# Patient Record
Sex: MALE | Race: WHITE | Employment: OTHER | URBAN - NONMETROPOLITAN AREA
[De-identification: names, ages, dates, MRNs, and addresses within clinical notes are randomized per-mention and may not be internally consistent; named-entity substitution may affect disease eponyms.]

---

## 2017-01-23 DIAGNOSIS — K21.9 GASTROESOPHAGEAL REFLUX DISEASE, ESOPHAGITIS PRESENCE NOT SPECIFIED: ICD-10-CM

## 2017-01-23 RX ORDER — RANITIDINE 300 MG/1
TABLET ORAL
Qty: 30 TABLET | Refills: 11 | Status: SHIPPED | OUTPATIENT
Start: 2017-01-23 | End: 2017-12-06

## 2017-01-23 RX ORDER — FERROUS SULFATE 325(65) MG
TABLET ORAL
Qty: 30 TABLET | Refills: 11 | Status: SHIPPED | OUTPATIENT
Start: 2017-01-23 | End: 2018-10-27 | Stop reason: SDUPTHER

## 2017-02-06 ENCOUNTER — OFFICE VISIT (OUTPATIENT)
Dept: FAMILY MEDICINE CLINIC | Age: 62
End: 2017-02-06

## 2017-02-06 VITALS
SYSTOLIC BLOOD PRESSURE: 122 MMHG | HEIGHT: 70 IN | DIASTOLIC BLOOD PRESSURE: 72 MMHG | WEIGHT: 248.2 LBS | BODY MASS INDEX: 35.53 KG/M2 | OXYGEN SATURATION: 97 % | HEART RATE: 87 BPM

## 2017-02-06 DIAGNOSIS — K31.9 DYSPEPSIA AND DISORDER OF FUNCTION OF STOMACH: ICD-10-CM

## 2017-02-06 DIAGNOSIS — E78.2 MIXED HYPERLIPIDEMIA: ICD-10-CM

## 2017-02-06 DIAGNOSIS — K21.9 GASTROESOPHAGEAL REFLUX DISEASE WITHOUT ESOPHAGITIS: ICD-10-CM

## 2017-02-06 DIAGNOSIS — M15.9 PRIMARY OSTEOARTHRITIS INVOLVING MULTIPLE JOINTS: ICD-10-CM

## 2017-02-06 DIAGNOSIS — R73.9 HYPERGLYCEMIA: ICD-10-CM

## 2017-02-06 DIAGNOSIS — Z11.59 NEED FOR HEPATITIS C SCREENING TEST: ICD-10-CM

## 2017-02-06 DIAGNOSIS — Z72.0 TOBACCO ABUSE: ICD-10-CM

## 2017-02-06 DIAGNOSIS — M1A.0790 IDIOPATHIC CHRONIC GOUT OF FOOT WITHOUT TOPHUS, UNSPECIFIED LATERALITY: ICD-10-CM

## 2017-02-06 DIAGNOSIS — R10.13 DYSPEPSIA AND DISORDER OF FUNCTION OF STOMACH: ICD-10-CM

## 2017-02-06 DIAGNOSIS — Z11.4 SCREENING FOR HIV (HUMAN IMMUNODEFICIENCY VIRUS): Primary | ICD-10-CM

## 2017-02-06 DIAGNOSIS — I10 ESSENTIAL HYPERTENSION, BENIGN: ICD-10-CM

## 2017-02-06 PROBLEM — M15.0 PRIMARY OSTEOARTHRITIS INVOLVING MULTIPLE JOINTS: Status: ACTIVE | Noted: 2017-02-06

## 2017-02-06 PROCEDURE — 99214 OFFICE O/P EST MOD 30 MIN: CPT | Performed by: FAMILY MEDICINE

## 2017-02-06 ASSESSMENT — PATIENT HEALTH QUESTIONNAIRE - PHQ9
1. LITTLE INTEREST OR PLEASURE IN DOING THINGS: 0
SUM OF ALL RESPONSES TO PHQ QUESTIONS 1-9: 0
SUM OF ALL RESPONSES TO PHQ9 QUESTIONS 1 & 2: 0
2. FEELING DOWN, DEPRESSED OR HOPELESS: 0

## 2017-02-07 LAB
HEPATITIS C ANTIBODY INTERPRETATION: NORMAL
HIV-1 AND HIV-2 ANTIBODIES: NORMAL
URIC ACID, SERUM: 6.1 MG/DL (ref 3.5–7.2)

## 2017-02-08 ENCOUNTER — TELEPHONE (OUTPATIENT)
Dept: FAMILY MEDICINE CLINIC | Age: 62
End: 2017-02-08

## 2017-02-08 DIAGNOSIS — Z23 NEED FOR ZOSTER VACCINATION: Primary | ICD-10-CM

## 2017-03-16 RX ORDER — MELATONIN
Qty: 100 TABLET | Refills: 0 | Status: SHIPPED | OUTPATIENT
Start: 2017-03-16 | End: 2019-05-08 | Stop reason: SDUPTHER

## 2017-04-18 RX ORDER — CHLORAL HYDRATE 500 MG
CAPSULE ORAL
Qty: 360 CAPSULE | Refills: 0 | Status: SHIPPED | OUTPATIENT
Start: 2017-04-18 | End: 2018-06-21 | Stop reason: SDUPTHER

## 2017-06-19 RX ORDER — ENALAPRIL MALEATE 20 MG/1
TABLET ORAL
Qty: 60 TABLET | Refills: 11 | Status: SHIPPED | OUTPATIENT
Start: 2017-06-19 | End: 2018-06-21 | Stop reason: SDUPTHER

## 2017-06-19 RX ORDER — SIMVASTATIN 20 MG
TABLET ORAL
Qty: 30 TABLET | Refills: 11 | Status: SHIPPED | OUTPATIENT
Start: 2017-06-19 | End: 2018-07-16 | Stop reason: SDUPTHER

## 2017-07-17 ENCOUNTER — TELEPHONE (OUTPATIENT)
Dept: FAMILY MEDICINE CLINIC | Age: 62
End: 2017-07-17

## 2017-08-08 ENCOUNTER — OFFICE VISIT (OUTPATIENT)
Dept: FAMILY MEDICINE CLINIC | Age: 62
End: 2017-08-08

## 2017-08-08 VITALS
HEIGHT: 70 IN | DIASTOLIC BLOOD PRESSURE: 80 MMHG | BODY MASS INDEX: 35.36 KG/M2 | SYSTOLIC BLOOD PRESSURE: 126 MMHG | OXYGEN SATURATION: 96 % | HEART RATE: 66 BPM | WEIGHT: 247 LBS | TEMPERATURE: 98.7 F

## 2017-08-08 DIAGNOSIS — E78.2 MIXED HYPERLIPIDEMIA: ICD-10-CM

## 2017-08-08 DIAGNOSIS — R10.13 DYSPEPSIA AND DISORDER OF FUNCTION OF STOMACH: ICD-10-CM

## 2017-08-08 DIAGNOSIS — Z01.818 PRE-OP EXAMINATION: Primary | ICD-10-CM

## 2017-08-08 DIAGNOSIS — Z72.0 TOBACCO ABUSE: ICD-10-CM

## 2017-08-08 DIAGNOSIS — M15.9 PRIMARY OSTEOARTHRITIS INVOLVING MULTIPLE JOINTS: ICD-10-CM

## 2017-08-08 DIAGNOSIS — I10 ESSENTIAL HYPERTENSION, BENIGN: ICD-10-CM

## 2017-08-08 DIAGNOSIS — R73.9 HYPERGLYCEMIA: ICD-10-CM

## 2017-08-08 DIAGNOSIS — R97.20 ABNORMAL PSA: ICD-10-CM

## 2017-08-08 DIAGNOSIS — K21.9 GASTROESOPHAGEAL REFLUX DISEASE WITHOUT ESOPHAGITIS: ICD-10-CM

## 2017-08-08 DIAGNOSIS — M1A.0790 IDIOPATHIC CHRONIC GOUT OF FOOT WITHOUT TOPHUS, UNSPECIFIED LATERALITY: ICD-10-CM

## 2017-08-08 DIAGNOSIS — K31.9 DYSPEPSIA AND DISORDER OF FUNCTION OF STOMACH: ICD-10-CM

## 2017-08-08 DIAGNOSIS — G47.33 OSA TREATED WITH BIPAP: ICD-10-CM

## 2017-08-08 DIAGNOSIS — E55.9 VITAMIN D DEFICIENCY: ICD-10-CM

## 2017-08-08 LAB
A/G RATIO: 1.8 (ref 1.1–2.2)
ALBUMIN SERPL-MCNC: 4.6 G/DL (ref 3.4–5)
ALP BLD-CCNC: 94 U/L (ref 40–129)
ALT SERPL-CCNC: 16 U/L (ref 10–40)
ANION GAP SERPL CALCULATED.3IONS-SCNC: 12 MMOL/L (ref 3–16)
AST SERPL-CCNC: 19 U/L (ref 15–37)
BASOPHILS ABSOLUTE: 0 K/UL (ref 0–0.2)
BASOPHILS RELATIVE PERCENT: 0.3 %
BILIRUB SERPL-MCNC: 0.4 MG/DL (ref 0–1)
BUN BLDV-MCNC: 17 MG/DL (ref 7–20)
CALCIUM SERPL-MCNC: 9.8 MG/DL (ref 8.3–10.6)
CHLORIDE BLD-SCNC: 101 MMOL/L (ref 99–110)
CHOLESTEROL, TOTAL: 112 MG/DL (ref 0–199)
CO2: 26 MMOL/L (ref 21–32)
CREAT SERPL-MCNC: 0.9 MG/DL (ref 0.8–1.3)
EOSINOPHILS ABSOLUTE: 0.2 K/UL (ref 0–0.6)
EOSINOPHILS RELATIVE PERCENT: 3.3 %
GFR AFRICAN AMERICAN: >60
GFR NON-AFRICAN AMERICAN: >60
GLOBULIN: 2.6 G/DL
GLUCOSE BLD-MCNC: 103 MG/DL (ref 70–99)
HCT VFR BLD CALC: 40.2 % (ref 40.5–52.5)
HDLC SERPL-MCNC: 31 MG/DL (ref 40–60)
HEMOGLOBIN: 13.7 G/DL (ref 13.5–17.5)
LDL CHOLESTEROL CALCULATED: 63 MG/DL
LYMPHOCYTES ABSOLUTE: 1.7 K/UL (ref 1–5.1)
LYMPHOCYTES RELATIVE PERCENT: 27.9 %
MCH RBC QN AUTO: 32 PG (ref 26–34)
MCHC RBC AUTO-ENTMCNC: 34 G/DL (ref 31–36)
MCV RBC AUTO: 94.1 FL (ref 80–100)
MONOCYTES ABSOLUTE: 0.5 K/UL (ref 0–1.3)
MONOCYTES RELATIVE PERCENT: 8.9 %
NEUTROPHILS ABSOLUTE: 3.6 K/UL (ref 1.7–7.7)
NEUTROPHILS RELATIVE PERCENT: 59.6 %
PDW BLD-RTO: 13.9 % (ref 12.4–15.4)
PLATELET # BLD: 214 K/UL (ref 135–450)
PMV BLD AUTO: 9.4 FL (ref 5–10.5)
POTASSIUM SERPL-SCNC: 4.9 MMOL/L (ref 3.5–5.1)
RBC # BLD: 4.27 M/UL (ref 4.2–5.9)
SODIUM BLD-SCNC: 139 MMOL/L (ref 136–145)
TOTAL PROTEIN: 7.2 G/DL (ref 6.4–8.2)
TRIGL SERPL-MCNC: 89 MG/DL (ref 0–150)
VITAMIN D 25-HYDROXY: 46.6 NG/ML
VLDLC SERPL CALC-MCNC: 18 MG/DL
WBC # BLD: 6 K/UL (ref 4–11)

## 2017-08-08 PROCEDURE — 36415 COLL VENOUS BLD VENIPUNCTURE: CPT | Performed by: FAMILY MEDICINE

## 2017-08-08 PROCEDURE — 99245 OFF/OP CONSLTJ NEW/EST HI 55: CPT | Performed by: FAMILY MEDICINE

## 2017-08-08 RX ORDER — TAMSULOSIN HYDROCHLORIDE 0.4 MG/1
CAPSULE ORAL
COMMUNITY
Start: 2017-07-19 | End: 2017-10-30 | Stop reason: ALTCHOICE

## 2017-08-08 RX ORDER — SULINDAC 150 MG/1
TABLET ORAL
COMMUNITY
Start: 2017-07-25 | End: 2017-08-08 | Stop reason: SDUPTHER

## 2017-08-18 RX ORDER — NIACIN 1000 MG/1
TABLET, EXTENDED RELEASE ORAL
Qty: 30 TABLET | Refills: 1 | Status: SHIPPED | OUTPATIENT
Start: 2017-08-18 | End: 2017-09-28 | Stop reason: SDUPTHER

## 2017-09-28 RX ORDER — NIACIN 1000 MG/1
TABLET, EXTENDED RELEASE ORAL
Qty: 30 TABLET | Refills: 0 | Status: SHIPPED | OUTPATIENT
Start: 2017-09-28 | End: 2017-12-02 | Stop reason: SDUPTHER

## 2017-12-01 ENCOUNTER — OFFICE VISIT (OUTPATIENT)
Dept: PULMONOLOGY | Age: 62
End: 2017-12-01

## 2017-12-01 VITALS
DIASTOLIC BLOOD PRESSURE: 72 MMHG | HEART RATE: 78 BPM | HEIGHT: 70 IN | WEIGHT: 238.2 LBS | RESPIRATION RATE: 20 BRPM | OXYGEN SATURATION: 98 % | TEMPERATURE: 97.7 F | BODY MASS INDEX: 34.1 KG/M2 | SYSTOLIC BLOOD PRESSURE: 112 MMHG

## 2017-12-01 DIAGNOSIS — G47.33 OSA TREATED WITH BIPAP: Primary | ICD-10-CM

## 2017-12-01 DIAGNOSIS — Z71.89 ENCOUNTER FOR BIPAP USE COUNSELING: ICD-10-CM

## 2017-12-01 PROCEDURE — 99213 OFFICE O/P EST LOW 20 MIN: CPT | Performed by: NURSE PRACTITIONER

## 2017-12-01 ASSESSMENT — SLEEP AND FATIGUE QUESTIONNAIRES
HOW LIKELY ARE YOU TO NOD OFF OR FALL ASLEEP WHILE SITTING QUIETLY AFTER LUNCH WITHOUT ALCOHOL: 2
HOW LIKELY ARE YOU TO NOD OFF OR FALL ASLEEP WHILE WATCHING TV: 2
HOW LIKELY ARE YOU TO NOD OFF OR FALL ASLEEP WHILE SITTING AND READING: 1
HOW LIKELY ARE YOU TO NOD OFF OR FALL ASLEEP WHEN YOU ARE A PASSENGER IN A CAR FOR AN HOUR WITHOUT A BREAK: 1
NECK CIRCUMFERENCE (INCHES): 19.5
HOW LIKELY ARE YOU TO NOD OFF OR FALL ASLEEP WHILE SITTING INACTIVE IN A PUBLIC PLACE: 1
ESS TOTAL SCORE: 8
HOW LIKELY ARE YOU TO NOD OFF OR FALL ASLEEP IN A CAR, WHILE STOPPED FOR A FEW MINUTES IN TRAFFIC: 0
HOW LIKELY ARE YOU TO NOD OFF OR FALL ASLEEP WHILE LYING DOWN TO REST IN THE AFTERNOON WHEN CIRCUMSTANCES PERMIT: 1
HOW LIKELY ARE YOU TO NOD OFF OR FALL ASLEEP WHILE SITTING AND TALKING TO SOMEONE: 0

## 2017-12-01 NOTE — PROGRESS NOTES
dysfunction     GERD (gastroesophageal reflux disease)     Hyperglycemia     Hyperlipidemia     Hypertension     Kidney stone 09/2016    AJ treated with BiPAP     Osteoarthritis     Trigger thumb     Vitamin D deficiency        Past Surgical History:        Procedure Laterality Date    APPENDECTOMY      BACK SURGERY      CARPAL TUNNEL RELEASE  11/12    right    CYSTOSCOPY Right 09/28/2016     RIGHT URETEROSCOPY , RIGHT STENT PLACEMENT    HEMORRHOID SURGERY      JOINT REPLACEMENT      right shoulder replacement    OTHER SURGICAL HISTORY  10/12/2016    CYSTOSCOPY, DIAGNOSTIC RIGHT URETEROSCOPY, RIGHT RETROGRADE    SHOULDER ARTHROPLASTY Left 07/02/13    left total shoulder replacement       Allergies:  has No Known Allergies. Social History:    TOBACCO:   reports that he has been smoking Cigarettes. He has a 26.00 pack-year smoking history. He has never used smokeless tobacco.  ETOH:   reports that he drinks alcohol.     Family History:       Problem Relation Age of Onset    Heart Disease Mother     High Blood Pressure Mother     Arthritis Mother     Heart Disease Father     Arthritis Father        Current Medications:    Current Outpatient Prescriptions:     sulindac (CLINORIL) 150 MG tablet, Take 150 mg by mouth 2 times daily, Disp: , Rfl:     niacin (NIASPAN) 1000 MG extended release tablet, TAKE 1 TABLET AT BEDTIME, Disp: 30 tablet, Rfl: 0    simvastatin (ZOCOR) 20 MG tablet, TAKE 1 TABLET AT BEDTIME, Disp: 30 tablet, Rfl: 11    enalapril (VASOTEC) 20 MG tablet, Take one (1) tablet twice daily, Disp: 60 tablet, Rfl: 11    Omega-3 Fatty Acids (FISH OIL) 1000 MG CAPS, TAKE 6 CAPSULES TWICE DAILY, Disp: 360 capsule, Rfl: 0    Cholecalciferol (VITAMIN D3) 1000 UNITS TABS, TAKE 2 TABLETS DAILY, Disp: 100 tablet, Rfl: 0    ferrous sulfate 325 (65 FE) MG tablet, TAKE ONE TABLET BY MOUTH DAILY, Disp: 30 tablet, Rfl: 11    ranitidine (ZANTAC) 300 MG tablet, Take 1 tablet by mouth nightly, or sleepy.    - Weight loss is recommended as a long-term intervention.    - Complications of AJ if not treated were discussed with patient patient, including: systemic hypertension, pulmonary hypertension, cardiovascular morbidities, car accidents and all cause mortality.  -Discussed take BIPAP to hospital if having surgery.  -Patient education handout provided regarding sleep tips and PAP cleaning recommendations     Follow up: 1 year, sooner if needed

## 2017-12-01 NOTE — PATIENT INSTRUCTIONS
few hours when alcohol levels fall there is a stimulant or wake-up effect and will cause fragmented sleep. Sleep rituals are important. Give your body clues it is time to slow down and sleep. Examples include; yoga, deep breathing, listen to relaxing music, a hot bath or a few minutes of reading. Have a fixed bedtime and awakening time, Even on weekends! You will feel better keeping a regular sleep cycle, even if you are retired or not working. Get into your favorite sleep position. If not asleep in 30 minutes, get up and do something boring until you feel sleepy. Remember not to expose yourself to bright lights such as TV, phone or tablet screens. Only use your bed for sleeping. Do not use your bed as an office, workroom or recreation room. Use comfortable bedding. Uncomfortable bedding can prevent good sleep. Ensure your bedroom is quiet and comfortable. A cooler room along with enough blankets to stay warm is recommended. If your room is too noisy, try a white noise machine. If too bright, try black out shades or an eye mask. Dont take worries to bed. Leave worries about work, school etc. behind you when you go to bed. Some people find it helpful to assign a worry period in the evening or late afternoon to write down your worries and get them out of your system.      CPAP Equipment Cleaning and Disinfecting Schedule  Equipment Cleaning Frequency Instructions  Disinfecting Frequency   Non-Disposable Filters  Weekly Mild soapy water, Rinse, Air Dry Not Required   Disposable Filters Change as needed  2-4 weeks Do Not Wash Not Required   Hose/tubing Daily Mild soapy water, Rinse, Air Dry Once a week   Mask / Nasal Pillows Daily Mild soapy water, Rinse, Air Dry Once a week   Headgear Weekly Hand wash, Mild soapy water, Rinse, Dry  Not Required   Humidifier Daily Empty water daily  Mild soapy water, Rinse well, Air Dry  Once a week   CPAP Unit As Needed Dust with damp cloth,  No detergents or sprays Not

## 2017-12-04 RX ORDER — NIACIN 1000 MG/1
TABLET, EXTENDED RELEASE ORAL
Qty: 30 TABLET | Refills: 0 | Status: SHIPPED | OUTPATIENT
Start: 2017-12-04 | End: 2018-01-02 | Stop reason: SDUPTHER

## 2017-12-06 ENCOUNTER — OFFICE VISIT (OUTPATIENT)
Dept: FAMILY MEDICINE CLINIC | Age: 62
End: 2017-12-06

## 2017-12-06 VITALS
DIASTOLIC BLOOD PRESSURE: 74 MMHG | SYSTOLIC BLOOD PRESSURE: 120 MMHG | HEART RATE: 82 BPM | BODY MASS INDEX: 33.96 KG/M2 | WEIGHT: 237.2 LBS | OXYGEN SATURATION: 97 % | HEIGHT: 70 IN | TEMPERATURE: 98.2 F

## 2017-12-06 DIAGNOSIS — G47.33 OSA TREATED WITH BIPAP: ICD-10-CM

## 2017-12-06 DIAGNOSIS — Z01.818 PREOPERATIVE TESTING: ICD-10-CM

## 2017-12-06 DIAGNOSIS — K21.9 GASTROESOPHAGEAL REFLUX DISEASE WITHOUT ESOPHAGITIS: ICD-10-CM

## 2017-12-06 DIAGNOSIS — E78.2 MIXED HYPERLIPIDEMIA: ICD-10-CM

## 2017-12-06 DIAGNOSIS — Z01.818 PREOP EXAMINATION: Primary | ICD-10-CM

## 2017-12-06 DIAGNOSIS — M15.9 PRIMARY OSTEOARTHRITIS INVOLVING MULTIPLE JOINTS: ICD-10-CM

## 2017-12-06 DIAGNOSIS — I10 ESSENTIAL HYPERTENSION, BENIGN: ICD-10-CM

## 2017-12-06 DIAGNOSIS — N23 RENAL COLIC ON RIGHT SIDE: ICD-10-CM

## 2017-12-06 DIAGNOSIS — Z72.0 TOBACCO ABUSE: ICD-10-CM

## 2017-12-06 DIAGNOSIS — M1A.0790 IDIOPATHIC CHRONIC GOUT OF FOOT WITHOUT TOPHUS, UNSPECIFIED LATERALITY: ICD-10-CM

## 2017-12-06 DIAGNOSIS — R73.9 HYPERGLYCEMIA: ICD-10-CM

## 2017-12-06 DIAGNOSIS — E79.0 HYPERURICEMIA: ICD-10-CM

## 2017-12-06 DIAGNOSIS — C61 PROSTATE CANCER (HCC): ICD-10-CM

## 2017-12-06 LAB
ANION GAP SERPL CALCULATED.3IONS-SCNC: 11 MMOL/L (ref 3–16)
BASOPHILS ABSOLUTE: 0 K/UL (ref 0–0.2)
BASOPHILS RELATIVE PERCENT: 0.4 %
BILIRUBIN URINE: NEGATIVE
BLOOD, URINE: NEGATIVE
BUN BLDV-MCNC: 15 MG/DL (ref 7–20)
CALCIUM SERPL-MCNC: 9.8 MG/DL (ref 8.3–10.6)
CHLORIDE BLD-SCNC: 100 MMOL/L (ref 99–110)
CLARITY: CLEAR
CO2: 28 MMOL/L (ref 21–32)
COLOR: YELLOW
CREAT SERPL-MCNC: 0.8 MG/DL (ref 0.8–1.3)
EOSINOPHILS ABSOLUTE: 0.1 K/UL (ref 0–0.6)
EOSINOPHILS RELATIVE PERCENT: 1.9 %
EPITHELIAL CELLS, UA: 0 /HPF (ref 0–5)
GFR AFRICAN AMERICAN: >60
GFR NON-AFRICAN AMERICAN: >60
GLUCOSE BLD-MCNC: 94 MG/DL (ref 70–99)
GLUCOSE URINE: NEGATIVE MG/DL
HCT VFR BLD CALC: 41 % (ref 40.5–52.5)
HEMOGLOBIN: 14.1 G/DL (ref 13.5–17.5)
HYALINE CASTS: 1 /LPF (ref 0–8)
KETONES, URINE: NEGATIVE MG/DL
LEUKOCYTE ESTERASE, URINE: NEGATIVE
LYMPHOCYTES ABSOLUTE: 1.5 K/UL (ref 1–5.1)
LYMPHOCYTES RELATIVE PERCENT: 20 %
MCH RBC QN AUTO: 32.4 PG (ref 26–34)
MCHC RBC AUTO-ENTMCNC: 34.4 G/DL (ref 31–36)
MCV RBC AUTO: 94.1 FL (ref 80–100)
MICROSCOPIC EXAMINATION: NORMAL
MONOCYTES ABSOLUTE: 0.5 K/UL (ref 0–1.3)
MONOCYTES RELATIVE PERCENT: 6.2 %
NEUTROPHILS ABSOLUTE: 5.6 K/UL (ref 1.7–7.7)
NEUTROPHILS RELATIVE PERCENT: 71.5 %
NITRITE, URINE: NEGATIVE
PDW BLD-RTO: 13.5 % (ref 12.4–15.4)
PH UA: 6.5
PLATELET # BLD: 212 K/UL (ref 135–450)
PMV BLD AUTO: 9.5 FL (ref 5–10.5)
POTASSIUM SERPL-SCNC: 4.8 MMOL/L (ref 3.5–5.1)
PROTEIN UA: NEGATIVE MG/DL
RBC # BLD: 4.35 M/UL (ref 4.2–5.9)
RBC UA: 1 /HPF (ref 0–4)
SODIUM BLD-SCNC: 139 MMOL/L (ref 136–145)
SPECIFIC GRAVITY UA: 1.02
UROBILINOGEN, URINE: 1 E.U./DL
WBC # BLD: 7.8 K/UL (ref 4–11)
WBC UA: 1 /HPF (ref 0–5)

## 2017-12-06 PROCEDURE — 99245 OFF/OP CONSLTJ NEW/EST HI 55: CPT | Performed by: FAMILY MEDICINE

## 2017-12-06 PROCEDURE — 81001 URINALYSIS AUTO W/SCOPE: CPT | Performed by: FAMILY MEDICINE

## 2017-12-06 PROCEDURE — 36415 COLL VENOUS BLD VENIPUNCTURE: CPT | Performed by: FAMILY MEDICINE

## 2017-12-06 PROCEDURE — 93000 ELECTROCARDIOGRAM COMPLETE: CPT | Performed by: FAMILY MEDICINE

## 2017-12-06 RX ORDER — NAPROXEN 500 MG/1
TABLET ORAL
COMMUNITY
Start: 2017-10-30 | End: 2017-12-06 | Stop reason: ALTCHOICE

## 2017-12-06 NOTE — LETTER
December 6, 2017     [phone] [place] [id number]      Dear Angeli Sim: Thank you for enrolling in [address] Please follow the instructions below to securely access your online medical record. [employer] allows you to send messages to your doctor, view your test results, renew your prescriptions, schedule appointments, and more. How Do I Sign Up? 1. In your Internet browser, go to [email or web address]  2. Click on the Sign Up Now link in the Sign In box. You will see the New Member Sign Up page. 3. Enter your [employer] Access Code exactly as it appears below. You will not need to use this code after youve completed the sign-up process. If you do not sign up before the expiration date, you must request a new code. [employer] Access Code: [email or web address]  Activation Code Expiration: 12/29/2017  1:31 AM  Enter your Social Security Number ([id number]) and Date of Birth (mm/dd/yyyy) as indicated and click Submit. You will be taken to the next sign-up page. 4. Create a [employer] ID. This will be your [employer] login ID and cannot be changed, so think of one that is secure and easy to remember. 5. Create a [employer] password. You can change your password at any time. 6. Enter your Password Reset Question and Answer. This can be used at a later time if you forget your password. 7. Enter your e-mail address. You will receive e-mail notification when new information is available in [address] 8. Click Sign Up. You can now view your medical record. Additional Information  If you have questions, please contact the physician practice where you receive care. Remember, [employer] is NOT to be used for urgent needs. For medical emergencies, dial 911. For questions regarding your [employer] account call [phone]. If you have a clinical question, please call your doctor's office.

## 2017-12-06 NOTE — PROGRESS NOTES
consultation at the request of surgeon, Dr. Viet Kinney, who plans on performing prostatectomy on December 19 at [facility]. The current problem began 3 months ago, and symptoms have been unchanged with time. Conservative therapy: N/A.     Planned anesthesia: General   Known anesthesia problems: takes longer to wake after anesthesia   Bleeding risk: No recent or remote history of abnormal bleeding  Personal or FH of DVT/PE: No    Patient objection to receiving blood products: No    Patient Active Problem List   Diagnosis    Vitamin D deficiency    ED (erectile dysfunction)    Essential hypertension, benign    Hyperglycemia    Hyperuricemia    left TSR    AJ treated with BiPAP    Gastroesophageal reflux disease without esophagitis    Dyspepsia and disorder of function of stomach    Tobacco abuse    Renal colic on right side    Idiopathic chronic gout of foot without tophus    Mixed hyperlipidemia    Primary osteoarthritis involving multiple joints       Past Medical History:   Diagnosis Date    Arthritis     CTS (carpal tunnel syndrome)     Erectile dysfunction     GERD (gastroesophageal reflux disease)     Hyperglycemia     Hyperlipidemia     Hypertension     Kidney stone 09/2016    AJ treated with BiPAP     Osteoarthritis     Trigger thumb     Vitamin D deficiency      Past Surgical History:   Procedure Laterality Date    APPENDECTOMY      BACK SURGERY      CARPAL TUNNEL RELEASE  11/12    right    CYSTOSCOPY Right 09/28/2016     RIGHT URETEROSCOPY , RIGHT STENT PLACEMENT    HEMORRHOID SURGERY      JOINT REPLACEMENT      right shoulder replacement    OTHER SURGICAL HISTORY  10/12/2016    CYSTOSCOPY, DIAGNOSTIC RIGHT URETEROSCOPY, RIGHT RETROGRADE    SHOULDER ARTHROPLASTY Left 07/02/13    left total shoulder replacement     Family History   Problem Relation Age of Onset    Heart Disease Mother     High Blood Pressure Mother     Arthritis Mother     Heart Disease Father    [facility]

## 2017-12-08 LAB — URINE CULTURE, ROUTINE: NORMAL

## 2018-01-02 RX ORDER — NIACIN 1000 MG/1
TABLET, EXTENDED RELEASE ORAL
Qty: 30 TABLET | Refills: 5 | Status: SHIPPED | OUTPATIENT
Start: 2018-01-02 | End: 2018-07-26 | Stop reason: SDUPTHER

## 2018-01-26 ENCOUNTER — TELEPHONE (OUTPATIENT)
Dept: FAMILY MEDICINE CLINIC | Age: 63
End: 2018-01-26

## 2018-01-26 ENCOUNTER — NURSE ONLY (OUTPATIENT)
Dept: FAMILY MEDICINE CLINIC | Age: 63
End: 2018-01-26

## 2018-01-30 ENCOUNTER — OFFICE VISIT (OUTPATIENT)
Dept: FAMILY MEDICINE CLINIC | Age: 63
End: 2018-01-30

## 2018-01-30 VITALS
HEART RATE: 100 BPM | SYSTOLIC BLOOD PRESSURE: 122 MMHG | DIASTOLIC BLOOD PRESSURE: 70 MMHG | BODY MASS INDEX: 34.15 KG/M2 | WEIGHT: 238 LBS | OXYGEN SATURATION: 97 %

## 2018-01-30 DIAGNOSIS — T78.40XS ALLERGIC REACTION TO DRUG, SEQUELA: ICD-10-CM

## 2018-01-30 DIAGNOSIS — R21 SKIN RASH: ICD-10-CM

## 2018-01-30 DIAGNOSIS — L29.9 ITCHY SKIN: Primary | ICD-10-CM

## 2018-01-30 DIAGNOSIS — M79.89 LEG SWELLING: ICD-10-CM

## 2018-01-30 DIAGNOSIS — I83.893 VARICOSE VEINS OF LEG WITH EDEMA, BILATERAL: ICD-10-CM

## 2018-01-30 PROCEDURE — 99214 OFFICE O/P EST MOD 30 MIN: CPT | Performed by: NURSE PRACTITIONER

## 2018-01-30 RX ORDER — PREDNISONE 10 MG/1
TABLET ORAL
Qty: 30 TABLET | Refills: 0 | Status: SHIPPED | OUTPATIENT
Start: 2018-01-30 | End: 2018-02-09 | Stop reason: ALTCHOICE

## 2018-01-30 RX ORDER — HYDROXYZINE PAMOATE 25 MG/1
25 CAPSULE ORAL 2 TIMES DAILY PRN
Qty: 30 CAPSULE | Refills: 0 | Status: SHIPPED | OUTPATIENT
Start: 2018-01-30 | End: 2018-02-09 | Stop reason: ALTCHOICE

## 2018-01-30 ASSESSMENT — ENCOUNTER SYMPTOMS
RHINORRHEA: 0
PHOTOPHOBIA: 0
TROUBLE SWALLOWING: 0
CONSTIPATION: 0
CHOKING: 0
EYE ITCHING: 0
SINUS PRESSURE: 0
NAUSEA: 0
VOMITING: 0
WHEEZING: 0
EYE DISCHARGE: 0
DIARRHEA: 0
EYE PAIN: 0
EYE REDNESS: 0
SHORTNESS OF BREATH: 0
BLOOD IN STOOL: 0
BACK PAIN: 0
COUGH: 0
SORE THROAT: 0
ABDOMINAL PAIN: 0
COLOR CHANGE: 0
CHEST TIGHTNESS: 0

## 2018-01-30 NOTE — PROGRESS NOTES
[facility]  [place] [id number]  Dept: [phone]  Dept Fax: [phone]    Sregio Vaughan is a 60 y.o. male who presents today for his medical conditions/complaints as noted below. Sergio Vaughan is c/o of Rash (Pt states that he has welts that come up on his legs and arms that come up at different times but mostly at night. Pt states that he itches and he feels like he has a horrible sunburn. Pt had his prostate removed about 6 weeks ago and the welts started a few days after. )        HPI:     Chief Complaint   Patient presents with    Rash     Pt states that he has welts that come up on his legs and arms that come up at different times but mostly at night. Pt states that he itches and he feels like he has a horrible sunburn. Pt had his prostate removed about 6 weeks ago and the welts started a few days after. HPI    Mr. Nima Wright presents to the office today with complaints of rash and ankle swelling. He states he has had this rash for about 6 weeks after his surgery. He states the rash does not appear during the day and occurs at night. His rash itches him at night and feels like he has a sunburn. He explain the rash as feeling like he has welts. He states his rash comes and goes. He denies anyone in his house having the rash. The cold air improves his rash. He states he has started using a new body wash and has been stressed more than usual as he is wearing depends now after his surgery. He states the rash seems to be more prominent on his bilateral thighs, arms, and chest.      Mr. Nima Wright states he noticed a week ago that his socks are feeling tight on his ankles. He has never had swelling in his ankles before. He denies orthopnea, shortness of breath,  Abdominal distention. He admits to a history of varicose veins. He denies a history of venous insufficiency.       He was recently on Cipro after his surgery. He stopped taking Cipro when he developed wrist and ankle swelling. He states the swelling has improved dramatically; however, he noticed his rash worsened when cipro was introduced to him and has not improved even after stopping the cipro.       Past Medical History:   Diagnosis Date    Arthritis     CTS (carpal tunnel syndrome)     Erectile dysfunction     GERD (gastroesophageal reflux disease)     Hyperglycemia     Hyperlipidemia     Hypertension     Kidney stone 09/2016    AJ treated with BiPAP     Osteoarthritis     Trigger thumb     Vitamin D deficiency       Past Surgical History:   Procedure Laterality Date    APPENDECTOMY      BACK SURGERY      CARPAL TUNNEL RELEASE  11/12    right    CYSTOSCOPY Right 09/28/2016     RIGHT URETEROSCOPY , RIGHT STENT PLACEMENT    HEMORRHOID SURGERY      JOINT REPLACEMENT      right shoulder replacement    OTHER SURGICAL HISTORY  10/12/2016    CYSTOSCOPY, DIAGNOSTIC RIGHT URETEROSCOPY, RIGHT RETROGRADE    PROSTATECTOMY      SHOULDER ARTHROPLASTY Left 07/02/13    left total shoulder replacement       Family History   Problem Relation Age of Onset    Heart Disease Mother     High Blood Pressure Mother     Arthritis Mother     Heart Disease Father     Arthritis Father        Social History   Substance Use Topics    Smoking status: Current Every Day Smoker     Packs/day: 1.00     Years: 26.00     Types: Cigarettes    Smokeless tobacco: Never Used      Comment: 1ppd    Alcohol use 0.0 oz/week      Comment: occasionally -2 x month      Current Outpatient Prescriptions   Medication Sig Dispense Refill    hydrOXYzine (VISTARIL) 25 MG capsule Take 1 capsule by mouth 2 times daily as needed for Itching 30 capsule 0    predniSONE (DELTASONE) 10 MG tablet Take 40 mg for 3 days then 30 mg for 3 days then 20 mg for 3 days then 10 mg for 3 days 30 tablet 0    cefixime (SUPRAX) 400 MG CAPS capsule Take 1 capsule by mouth daily 10 capsule 0    famotidine (PEPCID) 20 MG tablet Take 1 tablet by mouth 2 times daily 30 tablet 0    niacin (NIASPAN) 1000 MG extended release tablet TAKE 1 TABLET AT BEDTIME 30 tablet 5    simvastatin (ZOCOR) 20 MG tablet TAKE 1 TABLET AT BEDTIME 30 tablet 11    enalapril (VASOTEC) 20 MG tablet Take one (1) tablet twice daily 60 tablet 11    Omega-3 Fatty Acids (FISH OIL) 1000 MG CAPS TAKE 6 CAPSULES TWICE DAILY 360 capsule 0    Cholecalciferol (VITAMIN D3) 1000 UNITS TABS TAKE 2 TABLETS DAILY 100 tablet 0    ferrous sulfate 325 (65 FE) MG tablet TAKE ONE TABLET BY MOUTH DAILY 30 tablet 11    Biotin 1000 MCG TABS Take 1 tablet by mouth daily       allopurinol (ZYLOPRIM) 100 MG tablet Take 200 mg by mouth daily       Misc Natural Products (OSTEO BI-FLEX TRIPLE STRENGTH PO) Take 2 capsules by mouth daily. No current facility-administered medications for this visit. No Known Allergies    Subjective:      Review of Systems   Constitutional: Negative for activity change, appetite change, chills, diaphoresis, fatigue, fever and unexpected weight change. HENT: Negative for ear pain, rhinorrhea, sinus pressure, sneezing, sore throat and trouble swallowing. Eyes: Negative for photophobia, pain, discharge, redness, itching and visual disturbance. Respiratory: Negative for cough, choking, chest tightness, shortness of breath and wheezing. Cardiovascular: Positive for leg swelling. Negative for chest pain and palpitations. Gastrointestinal: Negative for abdominal pain, blood in stool, constipation, diarrhea, nausea and vomiting. Genitourinary: Negative for decreased urine volume, difficulty urinating, dysuria, frequency, hematuria and urgency. Urinary incontinence secondary to prostate surgery     Musculoskeletal: Negative for back pain, gait problem, joint swelling, myalgias and neck pain. Skin: Positive for rash (Comes at night). Negative for color change, pallor and wound.    Neurological: Negative mL/min/1.73m2 EGFR, calc. for ages 25 and older using the  MDRD formula (not corrected for weight), is valid for stable  renal function.  GFR  01/03/2018 >60  >60 Final    Comment: Chronic Kidney Disease: less than 60 ml/min/1.73 sq.m. Kidney Failure: less than 15 ml/min/1.73 sq.m. Results valid for patients 18 years and older.  Calcium 01/03/2018 9.5  8.3 - 10.6 mg/dL Final    Total Protein 01/03/2018 7.0  6.4 - 8.2 g/dL Final    Alb 01/03/2018 3.9  3.4 - 5.0 g/dL Final    Albumin/Globulin Ratio 01/03/2018 1.3  1.1 - 2.2 Final    Total Bilirubin 01/03/2018 0.5  0.0 - 1.0 mg/dL Final    Alkaline Phosphatase 01/03/2018 96  40 - 129 U/L Final    ALT 01/03/2018 13  10 - 40 U/L Final    AST 01/03/2018 14* 15 - 37 U/L Final    Globulin 01/03/2018 3.1  g/dL Final    Color, UA 01/03/2018 Yellow  Straw/Yellow Final    Clarity, UA 01/03/2018 Clear  Clear Final    Glucose, Ur 01/03/2018 Negative  Negative mg/dL Final    Bilirubin Urine 01/03/2018 Negative  Negative Final    Ketones, Urine 01/03/2018 Negative  Negative mg/dL Final    Specific Gravity, UA 01/03/2018 >=1.030  1.005 - 1.030 Final    Blood, Urine 01/03/2018 MODERATE* Negative Final    pH, UA 01/03/2018 5.5  5.0 - 8.0 Final    Protein, UA 01/03/2018 30* Negative mg/dL Final    Urobilinogen, Urine 01/03/2018 0.2  <2.0 E.U./dL Final    Nitrite, Urine 01/03/2018 POSITIVE* Negative Final    Leukocyte Esterase, Urine 01/03/2018 SMALL* Negative Final    Microscopic Examination 01/03/2018 YES   Final    WBC, UA 01/03/2018 * 0 - 5 /HPF Final    RBC, UA 01/03/2018 5-10* 0 - 2 /HPF Final    Bacteria, UA 01/03/2018 1+* /HPF Final    Amorphous, UA 01/03/2018 1+* /HPF Final    Urine Culture, Routine 01/05/2018 No growth at 18-36 hours   Final           Assessment & Plan: The following diagnoses and conditions are stable with no further orders unless indicated:  1. Itchy skin    2. Skin rash    3.  Allergic reaction to drug, sequela    4. Leg swelling    5. Varicose veins of leg with edema, bilateral        Filemon Gomez was seen today for rash. Rash most likely related to stress and recent drug allergy to Cipro. Recommend him start taking Prednisone for his drug allergy and vistaril as needed for itching. Recommend him placing Aquaphor or Vaseline on his skin after bathing. He is to call the office in 3 days if his rash does not improve. Bilateral leg swelling seems to be related to venous insufficiency and varicose veins. Recommend elevating legs above his heart. Will continue to monitor to see if any additional testing or treatment will be necessary. Diagnoses and all orders for this visit:    Itchy skin  -     hydrOXYzine (VISTARIL) 25 MG capsule; Take 1 capsule by mouth 2 times daily as needed for Itching    Skin rash  -     predniSONE (DELTASONE) 10 MG tablet; Take 40 mg for 3 days then 30 mg for 3 days then 20 mg for 3 days then 10 mg for 3 days    Allergic reaction to drug, sequela  -     predniSONE (DELTASONE) 10 MG tablet; Take 40 mg for 3 days then 30 mg for 3 days then 20 mg for 3 days then 10 mg for 3 days    Leg swelling    Varicose veins of leg with edema, bilateral        Return in about 2 weeks (around 2/13/2018) for rash follow up and leg swelling. Patient should call the office immediately with new or ongoing signs or symptoms or worsening, or proceed to the emergency room. If you are on medications which could impair your senses, you are at risk of weakness, falls, dizziness, or drowsiness. You should be careful during activities which could place you at risk of harm, such as climbing, using stairs, operating machinery, or driving vehicles. If you feel you cannot safely do these activities, you should request others to help you, or avoid the activities altogether. If you are drowsy for any other reason, you should use the same precautions as listed above.     Call if pattern of symptoms change or persists for an extended time. Tobacco abuse: Patient was counseled about stopping tobacco use. Discussed harmful effects of continued tobacco use including COPD, cardiovascular disease, and/or death. Discussed the increased risk of pneumonia as well as the potential for substantial decline in lung function. The following recommendations were given to improve chances of quittin)                   Chances of stopping improve with each attempt     2)                   Encourage all other occupants in the house to quit     3)                   Remove all tobacco products from home, work, and vehicles     4)                   Tobacco cessation aids such as nicotine replacement therapy options    It is very important that he quit smoking. There are various alternatives available to help with this difficult task, but first and foremost, he must make a firm commitment and decision to quit. The nature of nicotine addiction is discussed. The usefulness of behavioral therapy is discussed and suggested. The correct use, cost and side effects of nicotine replacement therapy such as gum or patches is discussed. Bupropion and its cost (sometimes not covered fully by insurance) and side effects are reviewed. The quit rates are discussed. I recommend he not allow potential costs of treatment to deter him from using nicotine replacement therapy or bupropion, as the long term economic and health benefits are obvious.

## 2018-01-30 NOTE — PATIENT INSTRUCTIONS
Aquaphor lotion - may get generic brand. Use as needed and when out of the shower. Vaseline will also help. Call office in 3 days if not improving. Elevated legs above heart with blankets to decrease the swelling     Patient Education        Rash: Care Instructions  Your Care Instructions  A rash is any irritation or inflammation of the skin. Rashes have many possible causes, including allergy, infection, illness, heat, and emotional stress. Follow-up care is a key part of your treatment and safety. Be sure to make and go to all appointments, and call your doctor if you are having problems. It's also a good idea to know your test results and keep a list of the medicines you take. How can you care for yourself at home? · Wash the area with water only. Soap can make dryness and itching worse. Pat dry. · Put cold, wet cloths on the rash to reduce itching. · Keep cool, and stay out of the sun. · Leave the rash open to the air as much of the time as possible. · Sometimes petroleum jelly (Vaseline) can help relieve the discomfort caused by a rash. A moisturizing lotion, such as Cetaphil, also may help. Calamine lotion may help for rashes caused by contact with something (such as a plant or soap) that irritated the skin. Use it 3 or 4 times a day. · If your doctor prescribed a cream, use it as directed. If your doctor prescribed medicine, take it exactly as directed. · If your rash itches so badly that it interferes with your normal activities, take an over-the-counter antihistamine, such as diphenhydramine (Benadryl) or loratadine (Claritin). Read and follow all instructions on the label. When should you call for help? Call your doctor now or seek immediate medical care if:  ? · You have signs of infection, such as:  ¨ Increased pain, swelling, warmth, or redness. ¨ Red streaks leading from the area. ¨ Pus draining from the area. ¨ A fever. ? · You have joint pain along with the rash. ? Watch closely for changes in your health, and be sure to contact your doctor if:  ? · Your rash is changing or getting worse. For example, call if you have pain along with the rash, the rash is spreading, or you have new blisters. ? · You do not get better after 1 week. Where can you learn more? Go to [email or web address]://[email or web address] org and sign in to your [employer] account. Enter T411 in the Active Scaler box to learn more about \"Rash: Care Instructions. \"     If you do not have an account, please click on the \"Sign Up Now\" link. Current as of: October 13, 2016  Content Version: 11.5  © 5024-8542 [employer]. Care instructions adapted under license by [facility] ([facility]). If you have questions about a medicPatient Education        Stopping Smoking: Care Instructions  Your Care Instructions    Cigarette smokers crave the nicotine in cigarettes. Giving it up is much harder than simply changing a habit. Your body has to stop craving the nicotine. It is hard to quit, but you can do it. There are many tools that people use to quit smoking. You may find that combining tools works best for you. There are several steps to quitting. First you get ready to quit. Then you get support to help you. After that, you learn new skills and behaviors to become a nonsmoker. For many people, a necessary step is getting and using medicine. Your doctor will help you set up the plan that best meets your needs. You may want to attend a smoking cessation program to help you quit smoking. When you choose a program, look for one that has proven success. Ask your doctor for ideas. You will greatly increase your chances of success if you take medicine as well as get counseling or join a cessation program.  Some of the changes you feel when you first quit tobacco are uncomfortable. Your body will miss the nicotine at first, and you may feel short-tempered and grumpy. You may have trouble sleeping or concentrating.  Medicine

## 2018-02-01 ENCOUNTER — TELEPHONE (OUTPATIENT)
Dept: FAMILY MEDICINE CLINIC | Age: 63
End: 2018-02-01

## 2018-02-09 ENCOUNTER — OFFICE VISIT (OUTPATIENT)
Dept: FAMILY MEDICINE CLINIC | Age: 63
End: 2018-02-09

## 2018-02-09 VITALS
BODY MASS INDEX: 36.79 KG/M2 | WEIGHT: 234.4 LBS | SYSTOLIC BLOOD PRESSURE: 110 MMHG | HEART RATE: 78 BPM | OXYGEN SATURATION: 98 % | HEIGHT: 67 IN | DIASTOLIC BLOOD PRESSURE: 62 MMHG

## 2018-02-09 DIAGNOSIS — M1A.0790 IDIOPATHIC CHRONIC GOUT OF FOOT WITHOUT TOPHUS, UNSPECIFIED LATERALITY: ICD-10-CM

## 2018-02-09 DIAGNOSIS — Z72.0 TOBACCO ABUSE: ICD-10-CM

## 2018-02-09 DIAGNOSIS — N39.42 URINARY INCONTINENCE WITHOUT SENSORY AWARENESS: ICD-10-CM

## 2018-02-09 DIAGNOSIS — M15.9 PRIMARY OSTEOARTHRITIS INVOLVING MULTIPLE JOINTS: ICD-10-CM

## 2018-02-09 DIAGNOSIS — Z90.79 S/P PROSTATECTOMY: ICD-10-CM

## 2018-02-09 DIAGNOSIS — R73.9 HYPERGLYCEMIA: Primary | ICD-10-CM

## 2018-02-09 DIAGNOSIS — Z12.11 COLON CANCER SCREENING: ICD-10-CM

## 2018-02-09 DIAGNOSIS — K21.9 GASTROESOPHAGEAL REFLUX DISEASE WITHOUT ESOPHAGITIS: ICD-10-CM

## 2018-02-09 DIAGNOSIS — C61 PROSTATE CANCER (HCC): ICD-10-CM

## 2018-02-09 DIAGNOSIS — E78.2 MIXED HYPERLIPIDEMIA: ICD-10-CM

## 2018-02-09 DIAGNOSIS — I10 ESSENTIAL HYPERTENSION, BENIGN: ICD-10-CM

## 2018-02-09 DIAGNOSIS — F17.219 NICOTINE DEPENDENCE, CIGARETTES, WITH UNSPECIFIED NICOTINE-INDUCED DISORDERS: ICD-10-CM

## 2018-02-09 LAB — HBA1C MFR BLD: 5.9 %

## 2018-02-09 PROCEDURE — 83036 HEMOGLOBIN GLYCOSYLATED A1C: CPT | Performed by: FAMILY MEDICINE

## 2018-02-09 PROCEDURE — 99214 OFFICE O/P EST MOD 30 MIN: CPT | Performed by: FAMILY MEDICINE

## 2018-02-09 PROCEDURE — G0296 VISIT TO DETERM LDCT ELIG: HCPCS | Performed by: FAMILY MEDICINE

## 2018-02-09 NOTE — PROGRESS NOTES
Chief Complaint   Patient presents with    Hypertension    Hyperlipidemia    Gastroesophageal Reflux   States he is miserable because he can't control urination at times,   He was constipated several weeks ago and that has improved. Still has a UTI, Dr Minh Perkins, has him on an antibiotic but doesn't know which one. Had issues right after the surgery with nerve endings being inflamed. Is here today for regular check up on HTN and hyperlipidemia. No recent gout flares that he is aware of. Patient is willing to have the CT scan of the chest with his history of smoking. HPI:  José Matt is a 60 y.o. (: 1955) here today for  Treatment Adherence:   Medication compliance:  compliant most of the time  Diet compliance:  noncompliant: has been eating more after the surgery due to inactivity and weather  Weight trend: stable  Current exercise: no regular exercise  Barriers: lack of motivation    Hypertension:  Home blood pressure monitoring: No. Patient denies chest pain and shortness of breath. Antihypertensive medication side effects: no medication side effects noted. Use of agents associated with hypertension: none. Sodium (mmol/L)   Date Value   2018 141    BUN (mg/dL)   Date Value   2018 16    Glucose (mg/dL)   Date Value   2018 121 (H)      Potassium (mmol/L)   Date Value   2018 3.8    CREATININE (mg/dL)   Date Value   2018 0.8         Hyperlipidemia:  No new myalgias or GI upset on simvastatin (Zocor). Lab Results   Component Value Date    CHOL 112 2017    TRIG 89 2017    HDL 31 (L) 2017    LDLCALC 63 2017     Lab Results   Component Value Date    ALT 13 2018    AST 14 (L) 2018      GERD  Symptoms well controlled with his current treatment of Pepcid prn.       Patient's medications, allergies, past medical, surgical, social and family histories were reviewed and updated as appropriate on mouth daily  Ren Reyes MD   famotidine (PEPCID) 20 MG tablet Take 1 tablet by mouth 2 times daily  Ren Reyes MD   niacin (NIASPAN) 1000 MG extended release tablet TAKE 1 TABLET AT BEDTIME  Héctor Ridley MD   simvastatin (ZOCOR) 20 MG tablet TAKE 1 TABLET AT BEDTIME  Héctor Ridley MD   enalapril (VASOTEC) 20 MG tablet Take one (1) tablet twice daily  Héctor Ridley MD   Omega-3 Fatty Acids (FISH OIL) 1000 MG CAPS TAKE 6 CAPSULES TWICE DAILY  Dorothy Kidd NP   Cholecalciferol (VITAMIN D3) 1000 UNITS TABS TAKE 2 TABLETS DAILY  Héctor Ridley MD   ferrous sulfate 325 (65 FE) MG tablet TAKE ONE TABLET BY MOUTH DAILY  Héctor Ridley MD   Biotin 1000 MCG TABS Take 1 tablet by mouth daily   Historical Provider, MD   allopurinol (ZYLOPRIM) 100 MG tablet Take 200 mg by mouth daily   Historical Provider, MD   Misc Natural Products (OSTEO BI-FLEX TRIPLE STRENGTH PO) Take 2 capsules by mouth daily. Historical Provider, MD     No Known Allergies    OBJECTIVE:  There were no vitals taken for this visit. BP Readings from Last 2 Encounters:   01/30/18 122/70   01/03/18 120/75     Wt Readings from Last 3 Encounters:   01/30/18 238 lb (108 kg)   01/03/18 230 lb (104.3 kg)   12/06/17 237 lb 3.2 oz (107.6 kg)       Physical Exam   Constitutional: He appears well-developed and well-nourished. No distress. HENT:   Head: Normocephalic and atraumatic. Right Ear: External ear normal.   Left Ear: External ear normal.   Nose: Nose normal.   Lips symmetric   Eyes: Lids are normal. Pupils are equal, round, and reactive to light. Right eye exhibits no discharge. Left eye exhibits no discharge. No scleral icterus. Neck: No JVD present. No thyromegaly present. Cardiovascular: Normal rate, regular rhythm and normal heart sounds. Pulmonary/Chest: Effort normal and breath sounds normal. No respiratory distress. Abdominal: Soft. There is no hepatosplenomegaly. There is no tenderness. Musculoskeletal: He exhibits edema (1+ bilateral lower extremity). Right shoulder: He exhibits decreased range of motion. Left shoulder: He exhibits decreased range of motion. Lumbar back: He exhibits decreased range of motion. Skin: Skin is warm and dry. No rash noted. He is not diaphoretic. No erythema. No pallor. Turgor normal   Psychiatric: He has a normal mood and affect. His behavior is normal. Judgment and thought content normal.   Nursing note and vitals reviewed. ASSESSMENT/PLAN:    Reid Friedman was seen today for hypertension, hyperlipidemia and gastroesophageal reflux. Diagnoses and all orders for this visit:    Hyperglycemia  -     Hemoglobin A1C    Colon cancer screening  -     Cancel: Eunice Solo, Lalita Gill MD    Essential hypertension, benign    Gastroesophageal reflux disease without esophagitis    S/P prostatectomy    Prostate cancer ([facility] [address]    Urinary incontinence without sensory awareness    Idiopathic chronic gout of foot without tophus, unspecified laterality    Tobacco abuse    Primary osteoarthritis involving multiple joints    Mixed hyperlipidemia      No evidence of hyper or hypoglycemia symptom wise, we'll monitor. Last colon cancer screening was done in August 2009 with negative findings, will be due again in August 2019. Blood pressure reflux acceptable attacks. He does have some urinary incontinence post complete prostatectomy, discussed that there will be time for this to recover. He asked if there was something to do with the urinary incontinence does not improve much on this and be a surgical question. The patient continues to smoke, knows he should not and is aware of the risk. Osteoarthritis is stable lipids have been acceptable. Follow-up 6 months.   He will do the low-dose CT of the chest.    Patient should call the office immediately with new or ongoing signs or symptoms or worsening, or proceed to the emergency room.  No changes in past medical history, past surgical history, social history, or family history were noted during the patient encounter unless specifically listed above. All updates of past medical history, past surgical history, social history, or family history were reviewed personally by me during the office visit. All problems listed in the assessment are stable unless noted otherwise. Medication profile reviewed personally by me during the office visit. Medication side effects and possible impairments from medications were discussed as applicable. This document was prepared by a combination of typing and transcription through a voice recognition software. Scribe attestation: Aimee Block RN, am scribing for and in the presence of Clemencia Mancuso MD. Electronically signed by Melina Gerard RN on 2/9/2018 at 7:22 AM      Provider attestation:     I, Dr. Kristin Lima, personally performed the services described in this documentation, as scribed by the above signed scribe in my presence, and it is both accurate and complete. I agree with the Chief Complaint, ROS, and Past Histories independently gathered by the clinical support staff and the remaining scribed note accurately describes my personal service to the patient.       2/9/2018    8:23 AM

## 2018-03-17 DIAGNOSIS — K21.9 GASTROESOPHAGEAL REFLUX DISEASE, ESOPHAGITIS PRESENCE NOT SPECIFIED: ICD-10-CM

## 2018-03-19 RX ORDER — RANITIDINE 300 MG/1
TABLET ORAL
Qty: 30 TABLET | Refills: 5 | Status: SHIPPED | OUTPATIENT
Start: 2018-03-19 | End: 2018-08-30 | Stop reason: SDUPTHER

## 2018-04-20 ENCOUNTER — NURSE ONLY (OUTPATIENT)
Dept: FAMILY MEDICINE CLINIC | Age: 63
End: 2018-04-20

## 2018-06-21 RX ORDER — CHLORAL HYDRATE 500 MG
CAPSULE ORAL
Qty: 360 CAPSULE | Refills: 0 | Status: SHIPPED | OUTPATIENT
Start: 2018-06-21 | End: 2018-08-29 | Stop reason: SDUPTHER

## 2018-06-21 RX ORDER — ENALAPRIL MALEATE 20 MG/1
TABLET ORAL
Qty: 60 TABLET | Refills: 0 | Status: SHIPPED | OUTPATIENT
Start: 2018-06-21 | End: 2018-08-06 | Stop reason: SDUPTHER

## 2018-07-09 ENCOUNTER — HOSPITAL ENCOUNTER (OUTPATIENT)
Dept: CT IMAGING | Age: 63
Discharge: HOME | End: 2018-07-09
Attending: UROLOGY | Admitting: UROLOGY

## 2018-07-09 DIAGNOSIS — C61 MALIGNANT NEOPLASM OF PROSTATE (HCC): ICD-10-CM

## 2018-07-15 ENCOUNTER — APPOINTMENT (OUTPATIENT)
Dept: CT IMAGING | Age: 63
End: 2018-07-15
Payer: COMMERCIAL

## 2018-07-15 ENCOUNTER — HOSPITAL ENCOUNTER (EMERGENCY)
Age: 63
Discharge: HOME | End: 2018-07-15
Attending: EMERGENCY MEDICINE
Payer: COMMERCIAL

## 2018-07-15 VITALS
HEIGHT: 70 IN | DIASTOLIC BLOOD PRESSURE: 57 MMHG | RESPIRATION RATE: 17 BRPM | TEMPERATURE: 97.9 F | WEIGHT: 230 LBS | HEART RATE: 75 BPM | OXYGEN SATURATION: 94 % | BODY MASS INDEX: 32.93 KG/M2 | SYSTOLIC BLOOD PRESSURE: 114 MMHG

## 2018-07-15 VITALS
SYSTOLIC BLOOD PRESSURE: 120 MMHG | HEART RATE: 62 BPM | BODY MASS INDEX: 32.93 KG/M2 | DIASTOLIC BLOOD PRESSURE: 78 MMHG | TEMPERATURE: 98 F | HEIGHT: 70 IN | RESPIRATION RATE: 16 BRPM | OXYGEN SATURATION: 94 % | WEIGHT: 230 LBS

## 2018-07-15 DIAGNOSIS — N20.0 KIDNEY STONE: ICD-10-CM

## 2018-07-15 DIAGNOSIS — R10.9 LEFT FLANK PAIN: Primary | ICD-10-CM

## 2018-07-15 DIAGNOSIS — N39.0 URINARY TRACT INFECTION WITHOUT HEMATURIA, SITE UNSPECIFIED: ICD-10-CM

## 2018-07-15 DIAGNOSIS — N23 RENAL COLIC: ICD-10-CM

## 2018-07-15 DIAGNOSIS — N20.1 LEFT URETERAL STONE: Primary | ICD-10-CM

## 2018-07-15 LAB
A/G RATIO: 1.5 (ref 1.1–2.2)
ALBUMIN SERPL-MCNC: 4.3 G/DL (ref 3.4–5)
ALP BLD-CCNC: 90 U/L (ref 40–129)
ALT SERPL-CCNC: 13 U/L (ref 10–40)
ANION GAP SERPL CALCULATED.3IONS-SCNC: 9 MMOL/L (ref 3–16)
AST SERPL-CCNC: 15 U/L (ref 15–37)
BACTERIA: ABNORMAL /HPF
BASOPHILS ABSOLUTE: 0 K/UL (ref 0–0.2)
BASOPHILS RELATIVE PERCENT: 0.6 %
BILIRUB SERPL-MCNC: 0.3 MG/DL (ref 0–1)
BILIRUBIN URINE: NEGATIVE
BLOOD, URINE: ABNORMAL
BUN BLDV-MCNC: 16 MG/DL (ref 7–20)
CALCIUM SERPL-MCNC: 9.5 MG/DL (ref 8.3–10.6)
CHLORIDE BLD-SCNC: 102 MMOL/L (ref 99–110)
CLARITY: ABNORMAL
CO2: 26 MMOL/L (ref 21–32)
COLOR: YELLOW
CREAT SERPL-MCNC: 1 MG/DL (ref 0.8–1.3)
EOSINOPHILS ABSOLUTE: 0.3 K/UL (ref 0–0.6)
EOSINOPHILS RELATIVE PERCENT: 4.4 %
EPITHELIAL CELLS, UA: ABNORMAL /HPF
GFR AFRICAN AMERICAN: >60
GFR NON-AFRICAN AMERICAN: >60
GLOBULIN: 2.9 G/DL
GLUCOSE BLD-MCNC: 115 MG/DL (ref 70–99)
GLUCOSE URINE: NEGATIVE MG/DL
HCT VFR BLD CALC: 36.2 % (ref 40.5–52.5)
HEMOGLOBIN: 12.6 G/DL (ref 13.5–17.5)
KETONES, URINE: NEGATIVE MG/DL
LEUKOCYTE ESTERASE, URINE: ABNORMAL
LYMPHOCYTES ABSOLUTE: 1.6 K/UL (ref 1–5.1)
LYMPHOCYTES RELATIVE PERCENT: 26.2 %
MCH RBC QN AUTO: 31.8 PG (ref 26–34)
MCHC RBC AUTO-ENTMCNC: 34.9 G/DL (ref 31–36)
MCV RBC AUTO: 91.2 FL (ref 80–100)
MICROSCOPIC EXAMINATION: YES
MONOCYTES ABSOLUTE: 0.5 K/UL (ref 0–1.3)
MONOCYTES RELATIVE PERCENT: 8 %
NEUTROPHILS ABSOLUTE: 3.8 K/UL (ref 1.7–7.7)
NEUTROPHILS RELATIVE PERCENT: 60.8 %
NITRITE, URINE: POSITIVE
PDW BLD-RTO: 15 % (ref 12.4–15.4)
PH UA: 5.5
PLATELET # BLD: 196 K/UL (ref 135–450)
PMV BLD AUTO: 8.5 FL (ref 5–10.5)
POTASSIUM REFLEX MAGNESIUM: 4.2 MMOL/L (ref 3.5–5.1)
PROTEIN UA: 100 MG/DL
RBC # BLD: 3.97 M/UL (ref 4.2–5.9)
RBC UA: ABNORMAL /HPF (ref 0–2)
SODIUM BLD-SCNC: 137 MMOL/L (ref 136–145)
SPECIFIC GRAVITY UA: >=1.03
TOTAL PROTEIN: 7.2 G/DL (ref 6.4–8.2)
URINE REFLEX TO CULTURE: YES
URINE TYPE: ABNORMAL
UROBILINOGEN, URINE: 0.2 E.U./DL
WBC # BLD: 6.3 K/UL (ref 4–11)
WBC UA: ABNORMAL /HPF (ref 0–5)

## 2018-07-15 PROCEDURE — 96374 THER/PROPH/DIAG INJ IV PUSH: CPT

## 2018-07-15 PROCEDURE — 6360000002 HC RX W HCPCS: Performed by: EMERGENCY MEDICINE

## 2018-07-15 PROCEDURE — 6370000000 HC RX 637 (ALT 250 FOR IP): Performed by: EMERGENCY MEDICINE

## 2018-07-15 PROCEDURE — 96376 TX/PRO/DX INJ SAME DRUG ADON: CPT

## 2018-07-15 PROCEDURE — 87086 URINE CULTURE/COLONY COUNT: CPT

## 2018-07-15 PROCEDURE — 96375 TX/PRO/DX INJ NEW DRUG ADDON: CPT

## 2018-07-15 PROCEDURE — 99284 EMERGENCY DEPT VISIT MOD MDM: CPT

## 2018-07-15 PROCEDURE — 87186 SC STD MICRODIL/AGAR DIL: CPT

## 2018-07-15 PROCEDURE — 85025 COMPLETE CBC W/AUTO DIFF WBC: CPT

## 2018-07-15 PROCEDURE — 6360000002 HC RX W HCPCS

## 2018-07-15 PROCEDURE — 96361 HYDRATE IV INFUSION ADD-ON: CPT

## 2018-07-15 PROCEDURE — 2580000003 HC RX 258: Performed by: EMERGENCY MEDICINE

## 2018-07-15 PROCEDURE — 74176 CT ABD & PELVIS W/O CONTRAST: CPT

## 2018-07-15 PROCEDURE — 99283 EMERGENCY DEPT VISIT LOW MDM: CPT

## 2018-07-15 PROCEDURE — 80053 COMPREHEN METABOLIC PANEL: CPT

## 2018-07-15 PROCEDURE — 87077 CULTURE AEROBIC IDENTIFY: CPT

## 2018-07-15 PROCEDURE — 81001 URINALYSIS AUTO W/SCOPE: CPT

## 2018-07-15 RX ORDER — CIPROFLOXACIN 2 MG/ML
400 INJECTION, SOLUTION INTRAVENOUS ONCE
Status: DISCONTINUED | OUTPATIENT
Start: 2018-07-15 | End: 2018-07-15

## 2018-07-15 RX ORDER — KETOROLAC TROMETHAMINE 30 MG/ML
60 INJECTION, SOLUTION INTRAMUSCULAR; INTRAVENOUS ONCE
Status: COMPLETED | OUTPATIENT
Start: 2018-07-15 | End: 2018-07-15

## 2018-07-15 RX ORDER — TAMSULOSIN HYDROCHLORIDE 0.4 MG/1
0.4 CAPSULE ORAL DAILY
Qty: 14 CAPSULE | Refills: 0 | Status: SHIPPED | OUTPATIENT
Start: 2018-07-15 | End: 2018-08-17 | Stop reason: ALTCHOICE

## 2018-07-15 RX ORDER — ONDANSETRON 2 MG/ML
INJECTION INTRAMUSCULAR; INTRAVENOUS
Status: COMPLETED
Start: 2018-07-15 | End: 2018-07-15

## 2018-07-15 RX ORDER — MORPHINE SULFATE 2 MG/ML
4 INJECTION, SOLUTION INTRAMUSCULAR; INTRAVENOUS
Status: DISCONTINUED | OUTPATIENT
Start: 2018-07-15 | End: 2018-07-15 | Stop reason: HOSPADM

## 2018-07-15 RX ORDER — KETOROLAC TROMETHAMINE 10 MG/1
10 TABLET, FILM COATED ORAL EVERY 6 HOURS PRN
Qty: 20 TABLET | Refills: 0 | Status: SHIPPED | OUTPATIENT
Start: 2018-07-15 | End: 2018-08-17 | Stop reason: ALTCHOICE

## 2018-07-15 RX ORDER — OXYCODONE HYDROCHLORIDE AND ACETAMINOPHEN 5; 325 MG/1; MG/1
1 TABLET ORAL EVERY 6 HOURS PRN
Qty: 12 TABLET | Refills: 0 | Status: SHIPPED | OUTPATIENT
Start: 2018-07-15 | End: 2018-07-22

## 2018-07-15 RX ORDER — ONDANSETRON 2 MG/ML
4 INJECTION INTRAMUSCULAR; INTRAVENOUS ONCE
Status: COMPLETED | OUTPATIENT
Start: 2018-07-15 | End: 2018-07-15

## 2018-07-15 RX ORDER — CIPROFLOXACIN 500 MG/1
500 TABLET, FILM COATED ORAL ONCE
Status: COMPLETED | OUTPATIENT
Start: 2018-07-15 | End: 2018-07-15

## 2018-07-15 RX ORDER — KETOROLAC TROMETHAMINE 30 MG/ML
30 INJECTION, SOLUTION INTRAMUSCULAR; INTRAVENOUS ONCE
Status: COMPLETED | OUTPATIENT
Start: 2018-07-15 | End: 2018-07-15

## 2018-07-15 RX ORDER — OXYCODONE HYDROCHLORIDE AND ACETAMINOPHEN 5; 325 MG/1; MG/1
1 TABLET ORAL ONCE
Status: COMPLETED | OUTPATIENT
Start: 2018-07-15 | End: 2018-07-15

## 2018-07-15 RX ORDER — 0.9 % SODIUM CHLORIDE 0.9 %
500 INTRAVENOUS SOLUTION INTRAVENOUS ONCE
Status: COMPLETED | OUTPATIENT
Start: 2018-07-15 | End: 2018-07-15

## 2018-07-15 RX ORDER — CIPROFLOXACIN 500 MG/1
500 TABLET, FILM COATED ORAL 2 TIMES DAILY
Qty: 20 TABLET | Refills: 0 | Status: SHIPPED | OUTPATIENT
Start: 2018-07-15 | End: 2018-08-17 | Stop reason: SDUPTHER

## 2018-07-15 RX ORDER — ONDANSETRON 4 MG/1
4 TABLET, FILM COATED ORAL EVERY 8 HOURS PRN
Qty: 12 TABLET | Refills: 0 | Status: SHIPPED | OUTPATIENT
Start: 2018-07-15 | End: 2018-08-17 | Stop reason: ALTCHOICE

## 2018-07-15 RX ADMIN — OXYCODONE HYDROCHLORIDE AND ACETAMINOPHEN 1 TABLET: 5; 325 TABLET ORAL at 21:18

## 2018-07-15 RX ADMIN — MORPHINE SULFATE 4 MG: 2 INJECTION, SOLUTION INTRAMUSCULAR; INTRAVENOUS at 03:14

## 2018-07-15 RX ADMIN — CIPROFLOXACIN 500 MG: 500 TABLET, FILM COATED ORAL at 05:16

## 2018-07-15 RX ADMIN — KETOROLAC TROMETHAMINE 30 MG: 30 INJECTION, SOLUTION INTRAMUSCULAR at 03:12

## 2018-07-15 RX ADMIN — SODIUM CHLORIDE 500 ML: 9 INJECTION, SOLUTION INTRAVENOUS at 03:12

## 2018-07-15 RX ADMIN — KETOROLAC TROMETHAMINE 60 MG: 60 INJECTION, SOLUTION INTRAMUSCULAR at 21:18

## 2018-07-15 RX ADMIN — ONDANSETRON 4 MG: 2 INJECTION INTRAMUSCULAR; INTRAVENOUS at 03:13

## 2018-07-15 ASSESSMENT — PAIN SCALES - GENERAL
PAINLEVEL_OUTOF10: 10
PAINLEVEL_OUTOF10: 10
PAINLEVEL_OUTOF10: 4
PAINLEVEL_OUTOF10: 7
PAINLEVEL_OUTOF10: 8
PAINLEVEL_OUTOF10: 7

## 2018-07-15 ASSESSMENT — PAIN DESCRIPTION - DESCRIPTORS: DESCRIPTORS: ACHING

## 2018-07-15 ASSESSMENT — PAIN DESCRIPTION - LOCATION
LOCATION: FLANK
LOCATION: FLANK

## 2018-07-15 ASSESSMENT — PAIN DESCRIPTION - PAIN TYPE
TYPE: ACUTE PAIN
TYPE: ACUTE PAIN

## 2018-07-15 ASSESSMENT — PAIN DESCRIPTION - ORIENTATION
ORIENTATION: LEFT
ORIENTATION: RIGHT

## 2018-07-15 NOTE — ED PROVIDER NOTES
Arthritis Father        Social History     Social History    Marital status:      Spouse name: N/A    Number of children: N/A    Years of education: N/A     Occupational History    Not on file. Social History Main Topics    Smoking status: Current Every Day Smoker     Packs/day: 1.00     Years: 26.00     Types: Cigarettes    Smokeless tobacco: Never Used      Comment: 1ppd    Alcohol use 0.0 oz/week      Comment: occasionally -2 x month    Drug use: No    Sexual activity: Yes     Partners: Female     Other Topics Concern    Not on file     Social History Narrative    No narrative on file       Nursing notes reviewed. ED Triage Vitals [07/15/18 0255]   Enc Vitals Group      BP (!) 144/84      Pulse 64      Resp 14      Temp 98 °F (36.7 °C)      Temp Source Oral      SpO2 99 %      Weight 230 lb (104.3 kg)      Height 5' 10\" (1.778 m)      Head Circumference       Peak Flow       Pain Score       Pain Loc       Pain Edu? Excl. in [address]? GENERAL:  Awake, alert. Well developed, well nourished with apparent distress. HENT:  Normocephalic, Atraumatic, no lacerations. EYES:  Conjunctiva normal, Pupils equal round and reactive to light, extraocular movements normal.  NECK:  Trachea is midline. No stridor. CHEST:  Regular rate and rhythm, no murmurs/rubs/gallops, normal S1/S2, chest wall non-tender. LUNGS:  No respiratory distress. No abdominal retractions, no sternal retractions. Clear to auscultation bilaterally, no wheezing, no rhochi, no rales  ABDOMEN:  Soft, non-tender, non-distended. No guarding and no rebound. No costovertebral angle tenderness to palpation. Normal BS, no organomegaly, no abdominal masses  EXTREMITIES:  Normal range of motion, no edema, no tenderness, no deformity, distal pulses present. Moves extremities x4 with purpose. SKIN: Warm, dry and intact. NEUROLOGIC: Normal mental status. Moving all extremities to command.  Alert and oriented x4 without Total Bilirubin 0.3 0.0 - 1.0 mg/dL    Alkaline Phosphatase 90 40 - 129 U/L    ALT 13 10 - 40 U/L    AST 15 15 - 37 U/L    Globulin 2.9 g/dL   Urine Reflex to Culture   Result Value Ref Range    Color, UA Yellow Straw/Yellow    Clarity, UA CLOUDY (A) Clear    Glucose, Ur Negative Negative mg/dL    Bilirubin Urine Negative Negative    Ketones, Urine Negative Negative mg/dL    Specific Gravity, UA >=1.030 1.005 - 1.030    Blood, Urine LARGE (A) Negative    pH, UA 5.5 5.0 - 8.0    Protein,  (A) Negative mg/dL    Urobilinogen, Urine 0.2 <2.0 E.U./dL    Nitrite, Urine POSITIVE (A) Negative    Leukocyte Esterase, Urine SMALL (A) Negative    Microscopic Examination YES     Urine Reflex to Culture Yes     Urine Type Not Specified    Microscopic Urinalysis   Result Value Ref Range    WBC, UA 10-20 (A) 0 - 5 /HPF    RBC, UA 20-50 (A) 0 - 2 /HPF    Epi Cells 0-2 /HPF    Bacteria, UA 2+ (A) /HPF       PROCEDURES      MEDICAL DECISION MAKING    Did have a discussion with the patient about his urinalysis indicating a possible infection, I do suspect that the elevated white blood cell count is due to the significant elevated red blood cell count as well as his previous history of prostatectomy. I gave the patient the option of being admitted for possible infected kidney stone but without a fever and his pain is almost completely resolved he states he will attempt to control his symptoms at home and and started on antibiotics. Patient presents to ED for evaluation of acute flank pain. No history of direct trauma. Neurovascular exam in the ER is unremarkable for any deficits. Vitals signs have been reviewed and are stable. They are afebrile and nontoxic appearing, but in moderate distress due to pain. Appropriate labs and imaging studies were ordered. Pain was addressed with pain medication. On reevaluation patient is feeling improved.   Based on the history and exam, there is no significant evidence of fracture,

## 2018-07-17 LAB
ORGANISM: ABNORMAL
URINE CULTURE, ROUTINE: ABNORMAL
URINE CULTURE, ROUTINE: ABNORMAL

## 2018-07-17 RX ORDER — SIMVASTATIN 20 MG
TABLET ORAL
Qty: 30 TABLET | Refills: 11 | Status: SHIPPED | OUTPATIENT
Start: 2018-07-17 | End: 2019-07-19 | Stop reason: SDUPTHER

## 2018-07-26 ENCOUNTER — TELEPHONE (OUTPATIENT)
Dept: FAMILY MEDICINE CLINIC | Age: 63
End: 2018-07-26

## 2018-07-26 NOTE — TELEPHONE ENCOUNTER
Attempted to contact patient on 7/26/2018. (Aug 7)    Result: left message on the patient's voicemail asking patient to return my call. Pre-Visit planning not completed.

## 2018-07-27 RX ORDER — NIACIN 1000 MG/1
1000 TABLET, EXTENDED RELEASE ORAL NIGHTLY
Qty: 30 TABLET | Refills: 0 | Status: SHIPPED | OUTPATIENT
Start: 2018-07-27 | End: 2018-08-29 | Stop reason: SDUPTHER

## 2018-07-27 NOTE — TELEPHONE ENCOUNTER
Refill request for niacin medication.      Name of [id number] Jr Sheffield    Last visit - 2/9/18     Pending visit - 8/7/18    Last refill -1/2/18  5 refills

## 2018-08-03 NOTE — TELEPHONE ENCOUNTER
Attempted to contact patient on 8/3/2018. Result: left message on the patient's voicemail asking patient to return my call. Pre-Visit planning not completed.

## 2018-08-06 RX ORDER — ENALAPRIL MALEATE 20 MG/1
TABLET ORAL
Qty: 60 TABLET | Refills: 11 | Status: SHIPPED | OUTPATIENT
Start: 2018-08-06 | End: 2019-08-02 | Stop reason: SDUPTHER

## 2018-08-07 ENCOUNTER — OFFICE VISIT (OUTPATIENT)
Dept: FAMILY MEDICINE CLINIC | Age: 63
End: 2018-08-07

## 2018-08-07 VITALS
WEIGHT: 240 LBS | DIASTOLIC BLOOD PRESSURE: 66 MMHG | OXYGEN SATURATION: 96 % | SYSTOLIC BLOOD PRESSURE: 114 MMHG | BODY MASS INDEX: 34.44 KG/M2

## 2018-08-07 DIAGNOSIS — K59.00 CONSTIPATION, UNSPECIFIED CONSTIPATION TYPE: ICD-10-CM

## 2018-08-07 DIAGNOSIS — M15.9 PRIMARY OSTEOARTHRITIS INVOLVING MULTIPLE JOINTS: ICD-10-CM

## 2018-08-07 DIAGNOSIS — N20.0 RENAL CALCULUS, LEFT: ICD-10-CM

## 2018-08-07 DIAGNOSIS — E78.2 MIXED HYPERLIPIDEMIA: ICD-10-CM

## 2018-08-07 DIAGNOSIS — Z12.11 COLON CANCER SCREENING: ICD-10-CM

## 2018-08-07 DIAGNOSIS — E55.9 VITAMIN D DEFICIENCY: ICD-10-CM

## 2018-08-07 DIAGNOSIS — Z72.0 TOBACCO ABUSE: ICD-10-CM

## 2018-08-07 DIAGNOSIS — R10.13 DYSPEPSIA AND DISORDER OF FUNCTION OF STOMACH: ICD-10-CM

## 2018-08-07 DIAGNOSIS — K31.9 DYSPEPSIA AND DISORDER OF FUNCTION OF STOMACH: ICD-10-CM

## 2018-08-07 DIAGNOSIS — R30.9 PAIN WITH URINATION: ICD-10-CM

## 2018-08-07 DIAGNOSIS — I10 ESSENTIAL HYPERTENSION, BENIGN: Primary | ICD-10-CM

## 2018-08-07 DIAGNOSIS — K21.9 GASTROESOPHAGEAL REFLUX DISEASE WITHOUT ESOPHAGITIS: ICD-10-CM

## 2018-08-07 DIAGNOSIS — R73.9 HYPERGLYCEMIA: ICD-10-CM

## 2018-08-07 DIAGNOSIS — G47.33 OSA TREATED WITH BIPAP: ICD-10-CM

## 2018-08-07 LAB — HBA1C MFR BLD: 5.6 %

## 2018-08-07 PROCEDURE — 99214 OFFICE O/P EST MOD 30 MIN: CPT | Performed by: FAMILY MEDICINE

## 2018-08-07 PROCEDURE — 83036 HEMOGLOBIN GLYCOSYLATED A1C: CPT | Performed by: FAMILY MEDICINE

## 2018-08-07 NOTE — PROGRESS NOTES
Chief Complaint   Patient presents with    Hyperlipidemia    Hypertension    Gout    Other     he has multiple medical problems   He has some constipation. He does not think he passed kidney stone. He has burning with urination. He has had burning with urination since the prostate surgery. He is frustrated because he has urinary urgency ever since the surgery. HPI:  Darryl Chaidez is a 60 y.o. (: 1955) here today for  Treatment Adherence:   Medication compliance:  compliant most of the time  Diet compliance:  compliant most of the time  Weight trend: stable  Current exercise: farming  Barriers: none    Hypertension:  Home blood pressure monitoring: No. Patient denies chest pain, shortness of breath and dry cough. Antihypertensive medication side effects: no medication side effects noted. Use of agents associated with hypertension: none. Sodium (mmol/L)   Date Value   07/15/2018 137    BUN (mg/dL)   Date Value   07/15/2018 16    Glucose (mg/dL)   Date Value   07/15/2018 115 (H)      Potassium reflex Magnesium (mmol/L)   Date Value   07/15/2018 4.2    CREATININE (mg/dL)   Date Value   07/15/2018 1.0         Hyperlipidemia:  no new myalgias or GI upset on simvastatin (Zocor) and omega 3. Lab Results   Component Value Date    CHOL 112 2017    TRIG 89 2017    HDL 31 (L) 2017    LDLCALC 63 2017     Lab Results   Component Value Date    ALT 13 07/15/2018    AST 15 07/15/2018          Patient's medications, allergies, past medical, surgical, social and family histories were reviewed and updated as appropriate on 2018 at 8:16 AM.    ROS:  Review of Systems    All other systems reviewed and are negative except as noted above on 2018 at 8:16 AM. Additional review of systems may be scanned into the media section of this medical record. Any responses requiring further intervention were pursued.     Hemoglobin A1C (%)   Date Value

## 2018-08-10 ENCOUNTER — TELEPHONE (OUTPATIENT)
Dept: FAMILY MEDICINE CLINIC | Age: 63
End: 2018-08-10

## 2018-08-13 DIAGNOSIS — Z12.11 SCREENING FOR COLON CANCER: Primary | ICD-10-CM

## 2018-08-13 NOTE — LETTER
COLONOSCOPY PREP INSTRUCTIONS  COLYTE SPLIT DOSE  [facility] PHYSICIAN ENDOSCOPY    Your colonoscopy is scheduled on: [email or web address]   __Ash___  Arrival Time: ___9:00AM___   DO NOT EAT OR DRINK (INCLUDING WATER) AFTER: ____7:00AM_____  [facility] [phone]  [facility] [phone]    Keep these papers together; REVIEW ALL OF THEM AT LEAST 7 DAYS BEFORE THE PROCEDURE. Please complete all paperwork; including a current list of your medications, to avoid delays in the admission process. The following instructions must be followed in order to ensure your procedure has optimal outcomes. - KEEP YOUR APPOINTMENT. If for any reason, you are unable to keep your appointment, please notify us within 72 hours before your procedure. - You MUST have a responsible adult to drive you, who MUST remain at our facility the ENTIRE time. If not the procedure will be cancelled. You may go by taxi ONLY if you have a responsible adult with you. You may experience light headedness, dizziness etc., therefore you should have a responsible adult remain with you until the morning after your procedure. - Bring your insurance card and 's license. Call your insurance carrier to verify your benefits, and confirm that our facility is in your network, prior to the procedure date to ensure coverage. The facility name is listed as [facility] or [facility] [address] and the tax ID# is [id number].  - Due to the safety and privacy of our patients, only one visitor is allowed in the recovery area after the procedure. The center will not be responsible for lost valuables so please leave them at home. - Try to avoid seeds (strawberries, terence, and rye) for one week prior to your procedure.   - If you have questions after beginning the prep, call between 8:30 am & Cyclopentyltrazolopyrimide (Ticagrelor or Brilinta), Cangrelor (Kirkland Olszewski), Dabigatran (Pradaxa), Apixaban (Eliquis), Edoxaban (Savaysa)you should have received instructions regarding if and when to discontinue the medication. If you have not, or do not clearly understand the instructions, please call the office for clarification (number listed above). 5. Drink plenty of fluid. 1 day prior to your procedure:  1. Do not eat any SOLID FOOD, beginning with breakfast drink clear liquids only, which includes: Chicken or Beef Broth, Coffee/tea (without milk or creamer) Gatorade/PowerAde (no red or purple), JeIl-O (no red or purple), All Soda (even dark cola), Sorbet/Popsicles (no red or purple), Water. If you take Diabetic medications (insulin/oral medication)-reduce the amount by one half on the morning of prep. You may resume the meds once you begin eating again. You must drink 8oz of liquids every hour to avoid dehydration. 2. If you take Diabetic medications (insulin/oral medication) - reduce the amount by one half on morning of prep. You may resume the meds once you begin eating again. 3. Bowel Cleansing Prep  * Prepare your Colyte by adding water to the fill line. Powder flavoring, like crystal light (not red or purple), may be added for flavor. Chilling the Colyte solution in the refrigerator and/or drinking the solution through a straw may make it easier to drink. (do not add ice)    5:45 pm Take all 4 dulcolax tablets    6:00pm Begin to drink the colyte/golyte solution. Drink one 8oz glass every 15 minutes until you have finished half of the solution (about 8 glasses, or 2 liters). It is ok to take short breaks in between glasses if you experience nausea or fullness. Restart when the symptoms improve. Continue to drink 8oz of liquids an hour until bedtime. Powder flavoring like crystal light may be added for flavor (no red or purple).   MORNING OF PROCEDURE Facility:      [facility] ENDO                                                         Procedure Date & Time:  [email or web address]           Pt arrival: 9:00AM                                                                                      Patient Name:  Rashard Ochoa     :  1955 PCP:  Anand Jordan MD       Home Ph:    [phone] (home)           SSN:                                         PROCEDURE:  Colonoscopy, possible polypectomy         [id number]      DIAGNOSIS:      ICD-10-CM ICD-9-CM    1. Screening for colon cancer Z12.11 V76.51 polyethylene glycol (GOLYTELY) 236 g solution      bisacodyl (DULCOLAX) 5 MG EC tablet     Anesthesia: _NONE__  Time Needed: 30 minutes  Pt Position:  lateral, right side up         Outpatient _X_                                    _X__PREP:  Golytely                           _____Cardiac Clearance by; ___________     Medications to be stopped 5 days before procedure: _________  Additional / Special Orders:                                                                                                                                                                                 Rashard Ochoa    1955                                                    Endoscopy Order   IN ACCORDANCE WITH OUR FORMULARY SYSTEM, A GENERIC EQUIVALENT DRUG MAY BE DISPNSED AND ADMINISTERED UNLESS D. A. W. IS WRITTEN WITH THE MEDICATION ORDER.   DATE HOUR PHYSICIAN:  RECORD DATE, HOUR AND SIGN EACH ENTRY   8/15/18 10:00AM 1)  Admit for:   [x]Colonoscopy  []EGD     []Anesthesia/MAC        []ERCP     []Upper EUS     []Lower EUS                             2)  Diagnosis: Screening for colon cancer     3)  Establish IV access     Solution:  []0.9 Normal Saline   [x]Lactated Ringers    []Other:                            Rate:   [x]KVO      []Other:     4)  Check blood sugar on all diabetic patients       Urine Pregnancy test on all menstruating females 5)  Labs:       []PT/INR         []Platelet       []Other:____________     6)  Procedure Medications:     []Fentanyl ______micrograms IV   []Demerol ________mg IV     []Versed _______mg IV                          []Other:     7) [x]Dinemap      [x]Pulse Oximetry    [x]Cardiac Monitor     8)  Post Procedure:       [x]Titrate O2 to keep O2 Sat.  > 90%     [x]Discharge instructions per                                                                     Endoscopy Protocol     [x]Discharge home when awake and vital signs stable                                                                          Signature:          Date:8/13/18               Time: 8:48 AM   PHYSICIANS ORDERS

## 2018-08-14 ENCOUNTER — TELEPHONE (OUTPATIENT)
Dept: GASTROENTEROLOGY | Age: 63
End: 2018-08-14

## 2018-08-15 ENCOUNTER — TELEPHONE (OUTPATIENT)
Dept: GASTROENTEROLOGY | Age: 63
End: 2018-08-15

## 2018-08-15 ENCOUNTER — HOSPITAL ENCOUNTER (OUTPATIENT)
Age: 63
Setting detail: OUTPATIENT SURGERY
Discharge: HOME | End: 2018-08-15
Attending: INTERNAL MEDICINE | Admitting: INTERNAL MEDICINE
Payer: COMMERCIAL

## 2018-08-15 VITALS
TEMPERATURE: 98.7 F | OXYGEN SATURATION: 93 % | RESPIRATION RATE: 16 BRPM | HEART RATE: 78 BPM | BODY MASS INDEX: 32.93 KG/M2 | HEIGHT: 70 IN | DIASTOLIC BLOOD PRESSURE: 69 MMHG | WEIGHT: 230 LBS | SYSTOLIC BLOOD PRESSURE: 117 MMHG

## 2018-08-15 PROBLEM — Z12.11 SCREEN FOR COLON CANCER: Status: ACTIVE | Noted: 2018-08-15

## 2018-08-15 PROBLEM — K57.30 DIVERTICULOSIS OF LARGE INTESTINE WITHOUT HEMORRHAGE: Status: ACTIVE | Noted: 2018-08-15

## 2018-08-15 PROCEDURE — 99153 MOD SED SAME PHYS/QHP EA: CPT | Performed by: INTERNAL MEDICINE

## 2018-08-15 PROCEDURE — 99152 MOD SED SAME PHYS/QHP 5/>YRS: CPT | Performed by: INTERNAL MEDICINE

## 2018-08-15 PROCEDURE — 3609027000 HC COLONOSCOPY: Performed by: INTERNAL MEDICINE

## 2018-08-15 PROCEDURE — 6360000002 HC RX W HCPCS: Performed by: INTERNAL MEDICINE

## 2018-08-15 PROCEDURE — 7100000011 HC PHASE II RECOVERY - ADDTL 15 MIN: Performed by: INTERNAL MEDICINE

## 2018-08-15 PROCEDURE — 2580000003 HC RX 258: Performed by: INTERNAL MEDICINE

## 2018-08-15 PROCEDURE — G0121 COLON CA SCRN NOT HI RSK IND: HCPCS | Performed by: INTERNAL MEDICINE

## 2018-08-15 PROCEDURE — 7100000010 HC PHASE II RECOVERY - FIRST 15 MIN: Performed by: INTERNAL MEDICINE

## 2018-08-15 PROCEDURE — 2709999900 HC NON-CHARGEABLE SUPPLY: Performed by: INTERNAL MEDICINE

## 2018-08-15 RX ORDER — FENTANYL CITRATE 50 UG/ML
INJECTION, SOLUTION INTRAMUSCULAR; INTRAVENOUS PRN
Status: DISCONTINUED | OUTPATIENT
Start: 2018-08-15 | End: 2018-08-15 | Stop reason: HOSPADM

## 2018-08-15 RX ORDER — SODIUM CHLORIDE, SODIUM LACTATE, POTASSIUM CHLORIDE, CALCIUM CHLORIDE 600; 310; 30; 20 MG/100ML; MG/100ML; MG/100ML; MG/100ML
1000 INJECTION, SOLUTION INTRAVENOUS ONCE
Status: COMPLETED | OUTPATIENT
Start: 2018-08-15 | End: 2018-08-15

## 2018-08-15 RX ORDER — MIDAZOLAM HYDROCHLORIDE 5 MG/ML
INJECTION INTRAMUSCULAR; INTRAVENOUS PRN
Status: DISCONTINUED | OUTPATIENT
Start: 2018-08-15 | End: 2018-08-15 | Stop reason: HOSPADM

## 2018-08-15 RX ADMIN — SODIUM CHLORIDE, POTASSIUM CHLORIDE, SODIUM LACTATE AND CALCIUM CHLORIDE 1000 ML: 600; 310; 30; 20 INJECTION, SOLUTION INTRAVENOUS at 09:49

## 2018-08-15 ASSESSMENT — PAIN SCALES - GENERAL
PAINLEVEL_OUTOF10: 0

## 2018-08-15 ASSESSMENT — PAIN - FUNCTIONAL ASSESSMENT: PAIN_FUNCTIONAL_ASSESSMENT: 0-10

## 2018-08-15 NOTE — H&P
[address] ,  [address] [facility]  Phone: [phone]    CHIEF COMPLAINT     Here for screening/surveillance colonoscopy. NANCIE Nava is a 60 y.o. male who presents for screening colonoscopy. He has issues with constipation. He has been seen recently in the ED multiple times for left flank pain and CT showed left obstructive calculus at left UVJ. PAST MEDICAL HISTORY     Past Medical History:   Diagnosis Date    Arthritis     CTS (carpal tunnel syndrome)     Erectile dysfunction     GERD (gastroesophageal reflux disease)     Hyperglycemia     Hyperlipidemia     Hypertension     Kidney stone 09/2016    AJ treated with BiPAP     Osteoarthritis     Trigger thumb     Vitamin D deficiency      FAMILY HISTORY     Family History   Problem Relation Age of Onset    Heart Disease Mother     High Blood Pressure Mother     Arthritis Mother     Heart Disease Father     Arthritis Father      SOCIAL HISTORY     Social History     Social History    Marital status:      Spouse name: N/A    Number of children: N/A    Years of education: N/A     Occupational History    Not on file.      Social History Main Topics    Smoking status: Current Every Day Smoker     Packs/day: 1.00     Years: 26.00     Types: Cigarettes    Smokeless tobacco: Never Used      Comment: 1ppd    Alcohol use 0.0 oz/week      Comment: occasionally -2 x month    Drug use: No    Sexual activity: Yes     Partners: Female     Other Topics Concern    Not on file     Social History Narrative    No narrative on file     SURGICAL HISTORY     Past Surgical History:   Procedure Laterality Date    APPENDECTOMY      BACK SURGERY      CARPAL TUNNEL RELEASE  11/12    right    CYSTOSCOPY Right 09/28/2016     RIGHT URETEROSCOPY , RIGHT STENT PLACEMENT    HEMORRHOID SURGERY      JOINT REPLACEMENT      right shoulder replacement    OTHER SURGICAL HISTORY 10/12/2016    CYSTOSCOPY, DIAGNOSTIC RIGHT URETEROSCOPY, RIGHT RETROGRADE    PROSTATECTOMY      SHOULDER ARTHROPLASTY Left 07/02/13    left total shoulder replacement     CURRENT MEDICATIONS   (This list may include medications prescribed during this encounter as epic can not insert only the list prior to this encounter.)     ALLERGIES     Allergies   Allergen Reactions    Suprax [Cefixime] Itching     Swollen hands and itching     IMMUNIZATIONS     Immunization History   Administered Date(s) Administered    Tdap (Boostrix, Adacel) 11/17/2014     REVIEW OF SYSTEMS     Constitutional: Negative for appetite change, chills, fatigue, fever and unexpected weight change. HENT: Negative for ear pain, hearing loss and nosebleeds. Eyes: Negative for pain and visual disturbance. Respiratory: Negative for cough, shortness of breath and wheezing. Cardiovascular: Negative for chest pain, palpitations and leg swelling. Gastrointestinal: see HPI for details. Endocrine: Negative for polydipsia, polyphagia and polyuria. Genitourinary: Negative for difficulty urinating, dysuria, hematuria and urgency. Musculoskeletal: Positive for arthralgias and back pain. Skin: Negative for pallor and rash. Allergic/Immunologic: Negative for environmental allergies and immunocompromised state. Neurological: Negative for seizures, syncope and numbness. Hematological: Negative for adenopathy. Does not bruise/bleed easily. Psychiatric/Behavioral: Negative for agitation, confusion, hallucinations and suicidal ideas. PHYSICAL EXAM   /68   Pulse 80   Temp 98.7 °F (37.1 °C) (Temporal)   Resp 16   Ht 5' 10\" (1.778 m)   Wt 230 lb (104.3 kg)   SpO2 93%   BMI 33.00 kg/m²   Wt Readings from Last 3 Encounters:   08/15/18 230 lb (104.3 kg)   08/07/18 240 lb (108.9 kg)   07/15/18 230 lb (104.3 kg)     Constitutional: AAO3, No acute distress  HEENT: no pallor or icterus.    Cardiovascular: Normal heart rate, Normal

## 2018-08-15 NOTE — OP NOTE
rectum reveals medium sized internal hemorrhoids. Perianal exam reveals skin tags. - Anesthesia issues: no    Specimens: Was Not Obtained    Complications:   None    Estimated blood loss: minimal    Disposition:   PACU - hemodynamically stable. Impression:   -Severe sigmoid diverticulosis. Otherwise normal to the terminal ileum. Retroflexion in the rectum reveals medium sized internal hemorrhoids. Perianal exam reveals skin tags. Recommendations:  -High fiber diet. Take Miralax daily for chronic constipation. If this does not help consider Linzess daily.  - Follow with Urology for renal stones and flank pain  - Repeat colonoscopy 10 years for screening.         Thai Boswell 8/15/18 10:45 AM

## 2018-08-17 ENCOUNTER — OFFICE VISIT (OUTPATIENT)
Dept: FAMILY MEDICINE CLINIC | Age: 63
End: 2018-08-17

## 2018-08-17 VITALS
BODY MASS INDEX: 33.96 KG/M2 | WEIGHT: 237.2 LBS | HEART RATE: 63 BPM | DIASTOLIC BLOOD PRESSURE: 63 MMHG | HEIGHT: 70 IN | SYSTOLIC BLOOD PRESSURE: 132 MMHG

## 2018-08-17 DIAGNOSIS — Z72.0 TOBACCO ABUSE: ICD-10-CM

## 2018-08-17 DIAGNOSIS — Z01.818 PREOPERATIVE CLEARANCE: Primary | ICD-10-CM

## 2018-08-17 DIAGNOSIS — N23 RENAL COLIC: ICD-10-CM

## 2018-08-17 DIAGNOSIS — R82.90 ABNORMAL FINDING ON URINALYSIS: ICD-10-CM

## 2018-08-17 LAB
BILIRUBIN, POC: NEGATIVE
BLOOD URINE, POC: NORMAL
CLARITY, POC: NORMAL
COLOR, POC: YELLOW
GLUCOSE URINE, POC: NEGATIVE
KETONES, POC: NEGATIVE
LEUKOCYTE EST, POC: NORMAL
NITRITE, POC: POSITIVE
PH, POC: 6
PROTEIN, POC: 30
SPECIFIC GRAVITY, POC: 1.02
UROBILINOGEN, POC: 1

## 2018-08-17 PROCEDURE — 93000 ELECTROCARDIOGRAM COMPLETE: CPT | Performed by: NURSE PRACTITIONER

## 2018-08-17 PROCEDURE — 81002 URINALYSIS NONAUTO W/O SCOPE: CPT | Performed by: NURSE PRACTITIONER

## 2018-08-17 PROCEDURE — 99243 OFF/OP CNSLTJ NEW/EST LOW 30: CPT | Performed by: NURSE PRACTITIONER

## 2018-08-17 RX ORDER — CIPROFLOXACIN 500 MG/1
500 TABLET, FILM COATED ORAL 2 TIMES DAILY
Qty: 10 TABLET | Refills: 0 | Status: SHIPPED | OUTPATIENT
Start: 2018-08-17 | End: 2018-08-20 | Stop reason: ALTCHOICE

## 2018-08-17 RX ORDER — CIPROFLOXACIN 500 MG/1
500 TABLET, FILM COATED ORAL 2 TIMES DAILY
Qty: 10 TABLET | Refills: 0 | Status: CANCELLED | OUTPATIENT
Start: 2018-08-17 | End: 2018-08-22

## 2018-08-17 RX ORDER — TAMSULOSIN HYDROCHLORIDE 0.4 MG/1
0.4 CAPSULE ORAL DAILY
Qty: 30 CAPSULE | Refills: 0 | Status: SHIPPED | OUTPATIENT
Start: 2018-08-17 | End: 2018-09-17 | Stop reason: SDUPTHER

## 2018-08-17 NOTE — PROGRESS NOTES
BiPAP    Gastroesophageal reflux disease without esophagitis    Dyspepsia and disorder of function of stomach    Tobacco abuse    Renal colic on right side    Idiopathic chronic gout of foot without tophus    Mixed hyperlipidemia    Primary osteoarthritis involving multiple joints    Screen for colon cancer    Diverticulosis of large intestine without hemorrhage       Past Medical History:   Diagnosis Date    Arthritis     CTS (carpal tunnel syndrome)     Erectile dysfunction     GERD (gastroesophageal reflux disease)     Hyperglycemia     Hyperlipidemia     Hypertension     Kidney stone 09/2016    AJ treated with BiPAP     Osteoarthritis     Trigger thumb     Vitamin D deficiency      Past Surgical History:   Procedure Laterality Date    APPENDECTOMY      BACK SURGERY      CARPAL TUNNEL RELEASE  11/12    right    COLONOSCOPY  08/14/2018    diverticulosis    CYSTOSCOPY Right 09/28/2016     RIGHT URETEROSCOPY , RIGHT STENT PLACEMENT    HEMORRHOID SURGERY      JOINT REPLACEMENT      right shoulder replacement    OTHER SURGICAL HISTORY  10/12/2016    CYSTOSCOPY, DIAGNOSTIC RIGHT URETEROSCOPY, RIGHT RETROGRADE    MA COLONOSCOPY FLX DX W/COLLJ SPEC WHEN PFRMD N/A 8/15/2018    COLONOSCOPY performed by Laurita Nguyen MD at [address] Left 07/02/13    left total shoulder replacement     Family History   Problem Relation Age of Onset    Heart Disease Mother     High Blood Pressure Mother     Arthritis Mother     Heart Disease Father     Arthritis Father      Social History     Social History    Marital status:      Spouse name: N/A    Number of children: N/A    Years of education: N/A     Occupational History    Not on file.      Social History Main Topics    Smoking status: Current Every Day Smoker     Packs/day: 1.00     Years: 26.00     Types: Cigarettes    Smokeless tobacco: Never Used      Comment: 1ppd    Alcohol use 0.0 oz/week      Comment: occasionally -2 x month    Drug use: No    Sexual activity: Yes     Partners: Female     Other Topics Concern    Not on file     Social History Narrative    No narrative on file       Review of Systems  A comprehensive review of systems was negative except for: Genitourinary: positive for decreased stream, dysuria, frequency, hematuria, hesitancy and nocturia     Physical Exam   Constitutional: He is oriented to person, place, and time. He appears well-developed and well-nourished. No distress. HENT:   Head: Normocephalic and atraumatic. Mouth/Throat: Uvula is midline, oropharynx is clear and moist and mucous membranes are normal.   Eyes: Conjunctivae and EOM are normal. Pupils are equal, round, and reactive to light. Neck: Trachea normal and normal range of motion. Neck supple. No JVD present. Carotid bruit is not present. No mass and no thyromegaly present. Cardiovascular: Normal rate, regular rhythm, normal heart sounds and intact distal pulses. Exam reveals no gallop and no friction rub. No murmur heard. Pulmonary/Chest: Effort normal and breath sounds normal. No respiratory distress. He has no wheezes. He has no rales. Abdominal: Soft. Normal aorta and bowel sounds are normal. He exhibits no distension and no mass. There is no hepatosplenomegaly. No tenderness. Musculoskeletal: He exhibits no edema and no tenderness. Neurological: He is alert and oriented to person, place, and time. He has normal strength. No cranial nerve deficit or sensory deficit. Coordination and gait normal.   Skin: Skin is warm and dry. No rash noted. No erythema. Psychiatric: He has a normal mood and affect. His behavior is normal.     EKG Interpretation:  normal EKG, normal sinus rhythm, unchanged from previous tracings.     Lab Review   Office Visit on 08/07/2018   Component Date Value    Hemoglobin A1C 08/07/2018 5.6    Admission on 07/15/2018, Discharged on 07/15/2018 disease, and/or death. Discussed the increased risk of pneumonia as well as the potential for substantial decline in lung function. The following recommendations were given to improve chances of quittin)                   Chances of stopping improve with each attempt     2)                   Encourage all other occupants in the house to quit     3)                   Remove all tobacco products from home, work, and vehicles     4)                   Tobacco cessation aids such as nicotine replacement therapy options    It is very important that he quit smoking. There are various alternatives available to help with this difficult task, but first and foremost, he must make a firm commitment and decision to quit. The nature of nicotine addiction is discussed. The usefulness of behavioral therapy is discussed and suggested. The correct use, cost and side effects of nicotine replacement therapy such as gum or patches is discussed. Bupropion and its cost (sometimes not covered fully by insurance) and side effects are reviewed. The quit rates are discussed. I recommend he not allow potential costs of treatment to deter him from using nicotine replacement therapy or bupropion, as the long term economic and health benefits are obvious.

## 2018-08-20 LAB
ORGANISM: ABNORMAL
URINE CULTURE, ROUTINE: ABNORMAL
URINE CULTURE, ROUTINE: ABNORMAL

## 2018-08-20 RX ORDER — CEFUROXIME AXETIL 250 MG/1
250 TABLET ORAL 2 TIMES DAILY
Qty: 14 TABLET | Refills: 0 | Status: CANCELLED | OUTPATIENT
Start: 2018-08-20 | End: 2018-08-27

## 2018-08-23 ENCOUNTER — HOSPITAL ENCOUNTER (OUTPATIENT)
Age: 63
Discharge: HOME | End: 2018-08-23
Payer: COMMERCIAL

## 2018-08-23 ENCOUNTER — HOSPITAL ENCOUNTER (OUTPATIENT)
Dept: GENERAL RADIOLOGY | Age: 63
Discharge: HOME | End: 2018-08-23
Payer: COMMERCIAL

## 2018-08-23 DIAGNOSIS — E55.9 VITAMIN D DEFICIENCY: ICD-10-CM

## 2018-08-23 LAB
INR BLD: 1.08 (ref 0.86–1.14)
PLATELET # BLD: 236 K/UL (ref 135–450)
PROTHROMBIN TIME: 12.3 SEC (ref 9.8–13)

## 2018-08-23 PROCEDURE — 36415 COLL VENOUS BLD VENIPUNCTURE: CPT

## 2018-08-23 PROCEDURE — 76937 US GUIDE VASCULAR ACCESS: CPT

## 2018-08-23 PROCEDURE — 85049 AUTOMATED PLATELET COUNT: CPT

## 2018-08-23 PROCEDURE — 36569 INSJ PICC 5 YR+ W/O IMAGING: CPT

## 2018-08-23 PROCEDURE — 85610 PROTHROMBIN TIME: CPT

## 2018-08-23 PROCEDURE — 2709999900 IR PICC WO SQ PORT/PUMP > 5 YEARS

## 2018-08-23 PROCEDURE — 77001 FLUOROGUIDE FOR VEIN DEVICE: CPT

## 2018-08-29 RX ORDER — NIACIN 1000 MG/1
1000 TABLET, EXTENDED RELEASE ORAL NIGHTLY
Qty: 30 TABLET | Refills: 5 | Status: SHIPPED | OUTPATIENT
Start: 2018-08-29 | End: 2019-03-30 | Stop reason: SDUPTHER

## 2018-08-29 RX ORDER — CHLORAL HYDRATE 500 MG
CAPSULE ORAL
Qty: 360 CAPSULE | Refills: 2 | Status: SHIPPED | OUTPATIENT
Start: 2018-08-29 | End: 2019-03-30 | Stop reason: SDUPTHER

## 2018-08-30 DIAGNOSIS — K21.9 GASTROESOPHAGEAL REFLUX DISEASE, ESOPHAGITIS PRESENCE NOT SPECIFIED: ICD-10-CM

## 2018-08-31 RX ORDER — RANITIDINE 300 MG/1
TABLET ORAL
Qty: 30 TABLET | Refills: 0 | Status: SHIPPED | OUTPATIENT
Start: 2018-08-31 | End: 2018-09-25 | Stop reason: SDUPTHER

## 2018-09-06 ENCOUNTER — HOSPITAL ENCOUNTER (OUTPATIENT)
Dept: CT IMAGING | Age: 63
Discharge: HOME | End: 2018-09-06
Payer: COMMERCIAL

## 2018-09-06 DIAGNOSIS — N20.1 CALCULUS OF URETER: ICD-10-CM

## 2018-09-06 PROCEDURE — 74176 CT ABD & PELVIS W/O CONTRAST: CPT

## 2018-09-14 PROBLEM — Z12.11 SCREEN FOR COLON CANCER: Status: RESOLVED | Noted: 2018-08-15 | Resolved: 2018-09-14

## 2018-09-17 DIAGNOSIS — R82.90 ABNORMAL FINDING ON URINALYSIS: ICD-10-CM

## 2018-09-17 RX ORDER — TAMSULOSIN HYDROCHLORIDE 0.4 MG/1
0.4 CAPSULE ORAL DAILY
Qty: 30 CAPSULE | Refills: 5 | Status: SHIPPED | OUTPATIENT
Start: 2018-09-17 | End: 2019-08-06

## 2018-10-29 RX ORDER — PNV NO.95/FERROUS FUM/FOLIC AC 28MG-0.8MG
TABLET ORAL
Qty: 100 TABLET | Refills: 0 | Status: SHIPPED | OUTPATIENT
Start: 2018-10-29 | End: 2020-12-16 | Stop reason: SDUPTHER

## 2018-11-30 ENCOUNTER — OFFICE VISIT (OUTPATIENT)
Dept: PULMONOLOGY | Age: 63
End: 2018-11-30
Payer: COMMERCIAL

## 2018-11-30 ENCOUNTER — TELEPHONE (OUTPATIENT)
Dept: PULMONOLOGY | Age: 63
End: 2018-11-30

## 2018-11-30 VITALS
SYSTOLIC BLOOD PRESSURE: 115 MMHG | BODY MASS INDEX: 33.79 KG/M2 | HEIGHT: 70 IN | RESPIRATION RATE: 16 BRPM | WEIGHT: 236 LBS | DIASTOLIC BLOOD PRESSURE: 66 MMHG | HEART RATE: 71 BPM | OXYGEN SATURATION: 97 % | TEMPERATURE: 98.1 F

## 2018-11-30 DIAGNOSIS — R53.83 OTHER FATIGUE: ICD-10-CM

## 2018-11-30 DIAGNOSIS — G47.33 OSA (OBSTRUCTIVE SLEEP APNEA): Primary | ICD-10-CM

## 2018-11-30 DIAGNOSIS — Z71.89 ENCOUNTER FOR BIPAP USE COUNSELING: ICD-10-CM

## 2018-11-30 DIAGNOSIS — E66.9 OBESITY (BMI 30-39.9): ICD-10-CM

## 2018-11-30 DIAGNOSIS — G47.33 OSA TREATED WITH BIPAP: Primary | ICD-10-CM

## 2018-11-30 PROCEDURE — 99213 OFFICE O/P EST LOW 20 MIN: CPT | Performed by: NURSE PRACTITIONER

## 2018-11-30 ASSESSMENT — SLEEP AND FATIGUE QUESTIONNAIRES
HOW LIKELY ARE YOU TO NOD OFF OR FALL ASLEEP WHILE WATCHING TV: 2
HOW LIKELY ARE YOU TO NOD OFF OR FALL ASLEEP WHILE SITTING INACTIVE IN A PUBLIC PLACE: 1
HOW LIKELY ARE YOU TO NOD OFF OR FALL ASLEEP IN A CAR, WHILE STOPPED FOR A FEW MINUTES IN TRAFFIC: 1
HOW LIKELY ARE YOU TO NOD OFF OR FALL ASLEEP WHILE SITTING QUIETLY AFTER LUNCH WITHOUT ALCOHOL: 1
HOW LIKELY ARE YOU TO NOD OFF OR FALL ASLEEP WHILE SITTING AND READING: 0
ESS TOTAL SCORE: 8
NECK CIRCUMFERENCE (INCHES): 19
HOW LIKELY ARE YOU TO NOD OFF OR FALL ASLEEP WHILE SITTING INACTIVE IN A PUBLIC PLACE: 1
HOW LIKELY ARE YOU TO NOD OFF OR FALL ASLEEP WHEN YOU ARE A PASSENGER IN A CAR FOR AN HOUR WITHOUT A BREAK: 3
HOW LIKELY ARE YOU TO NOD OFF OR FALL ASLEEP WHILE LYING DOWN TO REST IN THE AFTERNOON WHEN CIRCUMSTANCES PERMIT: 1
HOW LIKELY ARE YOU TO NOD OFF OR FALL ASLEEP WHILE SITTING QUIETLY AFTER LUNCH WITHOUT ALCOHOL: 1
HOW LIKELY ARE YOU TO NOD OFF OR FALL ASLEEP WHILE SITTING AND READING: 1
ESS TOTAL SCORE: 12
HOW LIKELY ARE YOU TO NOD OFF OR FALL ASLEEP WHILE LYING DOWN TO REST IN THE AFTERNOON WHEN CIRCUMSTANCES PERMIT: 1
HOW LIKELY ARE YOU TO NOD OFF OR FALL ASLEEP WHILE WATCHING TV: 3
HOW LIKELY ARE YOU TO NOD OFF OR FALL ASLEEP WHEN YOU ARE A PASSENGER IN A CAR FOR AN HOUR WITHOUT A BREAK: 2
HOW LIKELY ARE YOU TO NOD OFF OR FALL ASLEEP WHILE SITTING AND TALKING TO SOMEONE: 0
HOW LIKELY ARE YOU TO NOD OFF OR FALL ASLEEP WHILE SITTING AND TALKING TO SOMEONE: 0
HOW LIKELY ARE YOU TO NOD OFF OR FALL ASLEEP IN A CAR, WHILE STOPPED FOR A FEW MINUTES IN TRAFFIC: 2

## 2018-11-30 NOTE — PATIENT INSTRUCTIONS
an erection. Sleeping problems. Smoking is an expensive (costly) habit. You will save money if you choose to stop smoking. Sore throat. Staining of teeth. Women and smoking: You may have a higher risk of having a heart attack or stroke if you smoke and use birth control pills. This risk is more serious if you are 35 years or older. The risk of losing your unborn baby or having a stillborn baby is higher if you are pregnant and smoke. Babies born to smoking mothers often weigh less, and are at a higher risk of sudden infant death syndrome (SIDS). You may have a harder time getting pregnant if you are a smoker. Women who smoke may have a higher risk of osteoporosis (also known as \"brittle bones\"). Women who smoke also have a higher risk of incontinence, which is when you are unable to control when you urinate. Are there risks with smoking cigars or pipes? The risks are the same for people who smoke cigars or pipes as they are for cigarette smokers. There is a risk of getting cancer of the mouth, lip, larynx (voice box), or esophagus if you smoke a cigar or pipe. What are the risks of using snuff or chewing tobacco (\"smokeless tobacco\")? People who use snuff or chewing tobacco have an increased risk of getting mouth or throat cancer. The risk of heart disease, stroke, blood vessel disease and stomach problems is the same as it is for cigarette smokers. What is \"passive smoking\"? Tobacco smoke is dangerous to others. The effect that smoking has on nonsmokers is called \"passive smoking\". Nonsmokers who breathe tobacco smoke have the same health risks as smokers. Children who are around tobacco smoke may have more colds, ear infections, or other breathing problems. Why should I quit smoking? The benefits from quitting smoking happen right away. Your sense of taste and smell will improve. Your body, clothes, car, and home will not smell of tobacco smoke.  Your chance of getting cancer will be reduced as

## 2019-01-23 ENCOUNTER — TELEPHONE (OUTPATIENT)
Dept: FAMILY MEDICINE CLINIC | Age: 64
End: 2019-01-23

## 2019-02-05 ENCOUNTER — OFFICE VISIT (OUTPATIENT)
Dept: FAMILY MEDICINE CLINIC | Age: 64
End: 2019-02-05
Payer: COMMERCIAL

## 2019-02-05 VITALS
BODY MASS INDEX: 34.88 KG/M2 | SYSTOLIC BLOOD PRESSURE: 122 MMHG | HEART RATE: 74 BPM | HEIGHT: 70 IN | WEIGHT: 243.6 LBS | DIASTOLIC BLOOD PRESSURE: 66 MMHG | OXYGEN SATURATION: 98 %

## 2019-02-05 DIAGNOSIS — I10 ESSENTIAL HYPERTENSION, BENIGN: Primary | ICD-10-CM

## 2019-02-05 DIAGNOSIS — K21.9 GASTROESOPHAGEAL REFLUX DISEASE WITHOUT ESOPHAGITIS: ICD-10-CM

## 2019-02-05 DIAGNOSIS — R25.2 LEG CRAMPS: ICD-10-CM

## 2019-02-05 DIAGNOSIS — K59.01 SLOW TRANSIT CONSTIPATION: ICD-10-CM

## 2019-02-05 DIAGNOSIS — Z72.0 TOBACCO ABUSE: ICD-10-CM

## 2019-02-05 DIAGNOSIS — K57.30 DIVERTICULOSIS OF LARGE INTESTINE WITHOUT HEMORRHAGE: ICD-10-CM

## 2019-02-05 DIAGNOSIS — G47.33 OSA TREATED WITH BIPAP: ICD-10-CM

## 2019-02-05 DIAGNOSIS — M1A.0790 IDIOPATHIC CHRONIC GOUT OF FOOT WITHOUT TOPHUS, UNSPECIFIED LATERALITY: ICD-10-CM

## 2019-02-05 DIAGNOSIS — M15.9 PRIMARY OSTEOARTHRITIS INVOLVING MULTIPLE JOINTS: ICD-10-CM

## 2019-02-05 DIAGNOSIS — E55.9 VITAMIN D DEFICIENCY: ICD-10-CM

## 2019-02-05 DIAGNOSIS — Z12.5 SCREENING PSA (PROSTATE SPECIFIC ANTIGEN): ICD-10-CM

## 2019-02-05 DIAGNOSIS — E78.2 MIXED HYPERLIPIDEMIA: ICD-10-CM

## 2019-02-05 DIAGNOSIS — N20.0 NEPHROLITHIASIS: ICD-10-CM

## 2019-02-05 DIAGNOSIS — E79.0 HYPERURICEMIA: ICD-10-CM

## 2019-02-05 PROCEDURE — 99214 OFFICE O/P EST MOD 30 MIN: CPT | Performed by: FAMILY MEDICINE

## 2019-02-05 ASSESSMENT — PATIENT HEALTH QUESTIONNAIRE - PHQ9
2. FEELING DOWN, DEPRESSED OR HOPELESS: 0
1. LITTLE INTEREST OR PLEASURE IN DOING THINGS: 0
SUM OF ALL RESPONSES TO PHQ QUESTIONS 1-9: 0
SUM OF ALL RESPONSES TO PHQ QUESTIONS 1-9: 0
SUM OF ALL RESPONSES TO PHQ9 QUESTIONS 1 & 2: 0

## 2019-02-08 ENCOUNTER — NURSE ONLY (OUTPATIENT)
Dept: FAMILY MEDICINE CLINIC | Age: 64
End: 2019-02-08
Payer: COMMERCIAL

## 2019-02-08 DIAGNOSIS — I10 ESSENTIAL HYPERTENSION, BENIGN: ICD-10-CM

## 2019-02-08 DIAGNOSIS — R25.2 LEG CRAMPS: ICD-10-CM

## 2019-02-08 DIAGNOSIS — E78.2 MIXED HYPERLIPIDEMIA: ICD-10-CM

## 2019-02-08 DIAGNOSIS — Z12.5 SCREENING PSA (PROSTATE SPECIFIC ANTIGEN): ICD-10-CM

## 2019-02-08 DIAGNOSIS — E79.0 HYPERURICEMIA: ICD-10-CM

## 2019-02-08 DIAGNOSIS — E55.9 VITAMIN D DEFICIENCY: ICD-10-CM

## 2019-02-08 LAB
A/G RATIO: 2 (ref 1.1–2.2)
ALBUMIN SERPL-MCNC: 4.7 G/DL (ref 3.4–5)
ALP BLD-CCNC: 93 U/L (ref 40–129)
ALT SERPL-CCNC: 15 U/L (ref 10–40)
ANION GAP SERPL CALCULATED.3IONS-SCNC: 13 MMOL/L (ref 3–16)
AST SERPL-CCNC: 16 U/L (ref 15–37)
BILIRUB SERPL-MCNC: 0.4 MG/DL (ref 0–1)
BUN BLDV-MCNC: 16 MG/DL (ref 7–20)
CALCIUM SERPL-MCNC: 10 MG/DL (ref 8.3–10.6)
CHLORIDE BLD-SCNC: 103 MMOL/L (ref 99–110)
CHOLESTEROL, TOTAL: 120 MG/DL (ref 0–199)
CO2: 28 MMOL/L (ref 21–32)
CREAT SERPL-MCNC: 0.9 MG/DL (ref 0.8–1.3)
GFR AFRICAN AMERICAN: >60
GFR NON-AFRICAN AMERICAN: >60
GLOBULIN: 2.4 G/DL
GLUCOSE BLD-MCNC: 97 MG/DL (ref 70–99)
HDLC SERPL-MCNC: 37 MG/DL (ref 40–60)
LDL CHOLESTEROL CALCULATED: 65 MG/DL
MAGNESIUM: 2 MG/DL (ref 1.8–2.4)
PHOSPHORUS: 3.6 MG/DL (ref 2.5–4.9)
POTASSIUM SERPL-SCNC: 4.7 MMOL/L (ref 3.5–5.1)
PROSTATE SPECIFIC ANTIGEN: <0.01 NG/ML (ref 0–4)
SODIUM BLD-SCNC: 144 MMOL/L (ref 136–145)
TOTAL PROTEIN: 7.1 G/DL (ref 6.4–8.2)
TRIGL SERPL-MCNC: 89 MG/DL (ref 0–150)
TSH SERPL DL<=0.05 MIU/L-ACNC: 5.51 UIU/ML (ref 0.27–4.2)
URIC ACID, SERUM: 5.6 MG/DL (ref 3.5–7.2)
VITAMIN D 25-HYDROXY: 39.8 NG/ML
VLDLC SERPL CALC-MCNC: 18 MG/DL

## 2019-02-08 PROCEDURE — 36415 COLL VENOUS BLD VENIPUNCTURE: CPT | Performed by: FAMILY MEDICINE

## 2019-02-11 DIAGNOSIS — Z13.29 THYROID DISORDER SCREEN: ICD-10-CM

## 2019-02-11 DIAGNOSIS — R79.89 ELEVATED TSH: Primary | ICD-10-CM

## 2019-02-11 DIAGNOSIS — R79.89 ELEVATED TSH: ICD-10-CM

## 2019-02-11 LAB — T4 FREE: 1.2 NG/DL (ref 0.9–1.8)

## 2019-04-01 RX ORDER — NIACIN 1000 MG/1
1000 TABLET, EXTENDED RELEASE ORAL NIGHTLY
Qty: 30 TABLET | Refills: 3 | Status: SHIPPED | OUTPATIENT
Start: 2019-04-01 | End: 2019-08-02 | Stop reason: SDUPTHER

## 2019-04-01 RX ORDER — CHLORAL HYDRATE 500 MG
CAPSULE ORAL
Qty: 360 CAPSULE | Refills: 3 | Status: SHIPPED | OUTPATIENT
Start: 2019-04-01 | End: 2020-05-29 | Stop reason: SDUPTHER

## 2019-04-30 DIAGNOSIS — K21.9 GASTROESOPHAGEAL REFLUX DISEASE, ESOPHAGITIS PRESENCE NOT SPECIFIED: ICD-10-CM

## 2019-04-30 RX ORDER — RANITIDINE 300 MG/1
TABLET ORAL
Qty: 30 TABLET | Refills: 11 | Status: SHIPPED | OUTPATIENT
Start: 2019-04-30 | End: 2020-10-19

## 2019-07-03 ENCOUNTER — TELEPHONE (OUTPATIENT)
Dept: FAMILY MEDICINE CLINIC | Age: 64
End: 2019-07-03

## 2019-07-09 ENCOUNTER — OFFICE VISIT (OUTPATIENT)
Dept: FAMILY MEDICINE CLINIC | Age: 64
End: 2019-07-09
Payer: COMMERCIAL

## 2019-07-09 VITALS
OXYGEN SATURATION: 95 % | HEIGHT: 70 IN | BODY MASS INDEX: 34.93 KG/M2 | HEART RATE: 89 BPM | DIASTOLIC BLOOD PRESSURE: 78 MMHG | SYSTOLIC BLOOD PRESSURE: 106 MMHG | TEMPERATURE: 98.5 F | WEIGHT: 244 LBS

## 2019-07-09 DIAGNOSIS — Z01.818 PRE-OP EXAM: Primary | ICD-10-CM

## 2019-07-09 DIAGNOSIS — J01.00 ACUTE NON-RECURRENT MAXILLARY SINUSITIS: ICD-10-CM

## 2019-07-09 DIAGNOSIS — Z72.0 TOBACCO ABUSE: ICD-10-CM

## 2019-07-09 LAB
BASOPHILS ABSOLUTE: 0.1 K/UL (ref 0–0.2)
BASOPHILS RELATIVE PERCENT: 0.8 %
BILIRUBIN, POC: NORMAL
BLOOD URINE, POC: NORMAL
CLARITY, POC: NORMAL
COLOR, POC: NORMAL
EOSINOPHILS ABSOLUTE: 0.3 K/UL (ref 0–0.6)
EOSINOPHILS RELATIVE PERCENT: 3.3 %
GLUCOSE URINE, POC: NORMAL
HCT VFR BLD CALC: 41.4 % (ref 40.5–52.5)
HEMOGLOBIN: 14.2 G/DL (ref 13.5–17.5)
KETONES, POC: NORMAL
LEUKOCYTE EST, POC: NORMAL
LYMPHOCYTES ABSOLUTE: 2.4 K/UL (ref 1–5.1)
LYMPHOCYTES RELATIVE PERCENT: 30.4 %
MCH RBC QN AUTO: 32.2 PG (ref 26–34)
MCHC RBC AUTO-ENTMCNC: 34.3 G/DL (ref 31–36)
MCV RBC AUTO: 93.8 FL (ref 80–100)
MONOCYTES ABSOLUTE: 0.7 K/UL (ref 0–1.3)
MONOCYTES RELATIVE PERCENT: 8.7 %
NEUTROPHILS ABSOLUTE: 4.6 K/UL (ref 1.7–7.7)
NEUTROPHILS RELATIVE PERCENT: 56.8 %
NITRITE, POC: NORMAL
PDW BLD-RTO: 14.2 % (ref 12.4–15.4)
PH, POC: 6.5
PLATELET # BLD: 264 K/UL (ref 135–450)
PMV BLD AUTO: 10.1 FL (ref 5–10.5)
PROTEIN, POC: NORMAL
RBC # BLD: 4.41 M/UL (ref 4.2–5.9)
SPECIFIC GRAVITY, POC: 1.02
UROBILINOGEN, POC: 1
WBC # BLD: 8.1 K/UL (ref 4–11)

## 2019-07-09 PROCEDURE — 81002 URINALYSIS NONAUTO W/O SCOPE: CPT | Performed by: NURSE PRACTITIONER

## 2019-07-09 PROCEDURE — 36415 COLL VENOUS BLD VENIPUNCTURE: CPT | Performed by: NURSE PRACTITIONER

## 2019-07-09 PROCEDURE — 99243 OFF/OP CNSLTJ NEW/EST LOW 30: CPT | Performed by: NURSE PRACTITIONER

## 2019-07-09 RX ORDER — FLUTICASONE PROPIONATE 50 MCG
1 SPRAY, SUSPENSION (ML) NASAL DAILY
Qty: 1 BOTTLE | Refills: 3 | Status: SHIPPED | OUTPATIENT
Start: 2019-07-09 | End: 2020-10-19

## 2019-07-09 NOTE — PROGRESS NOTES
[address]                             Preoperative Evaluation        Phong Kee  YOB: 1955    Date of Service:  7/9/2019    Vitals:    07/09/19 1527   BP: 106/78   Site: Right Upper Arm   Position: Sitting   Cuff Size: Medium Adult   Pulse: 89   Temp: 98.5 °F (36.9 °C)   SpO2: 95%   Weight: 244 lb (110.7 kg)   Height: 5' 9.5\" (1.765 m)      Wt Readings from Last 2 Encounters:   07/09/19 244 lb (110.7 kg)   02/05/19 243 lb 9.6 oz (110.5 kg)     BP Readings from Last 3 Encounters:   07/09/19 106/78   02/05/19 122/66   11/30/18 115/66        Chief Complaint   Patient presents with    Pre-op Exam     pt is here for pre op exam for circumcision. Surgery is being done by Dr Kaylynn Gillis on 7/17/19 at [facility].       Allergies   Allergen Reactions    Suprax [Cefixime] Itching     Swollen hands and itching     Outpatient Medications Marked as Taking for the 7/9/19 encounter (Office Visit) with KALROS Gupta CNP   Medication Sig Dispense Refill    Cholecalciferol (VITAMIN D3) 1000 units TABS TAKE 2 TABLETS DAILY 100 tablet 3    ranitidine (ZANTAC) 300 MG tablet TAKE 1 TABLET BY MOUTH NIGHTLY 30 tablet 11    niacin (NIASPAN) 1000 MG extended release tablet TAKE 1 TABLET BY MOUTH NIGHTLY 30 tablet 3    Omega-3 Fatty Acids (FISH OIL) 1000 MG CAPS TAKE 6 CAPSULES TWICE DAILY 360 capsule 3    zoster recombinant adjuvanted vaccine (SHINGRIX) 50 MCG/0.5ML SUSR injection 1 dose now and repeat in 2-6 months 1 each 1    Ferrous Sulfate (IRON) 325 (65 Fe) MG TABS TAKE ONE TABLET BY MOUTH DAILY 100 tablet 0    tamsulosin (FLOMAX) 0.4 MG capsule Take 1 capsule by mouth daily 30 capsule 5    enalapril (VASOTEC) 20 MG tablet TAKE 1 TABLET BY MOUTH 2 TIMES DAILY 60 tablet 11    simvastatin (ZOCOR) 20 MG tablet TAKE 1 TABLET AT BEDTIME 30 tablet 11    famotidine (PEPCID) 20 MG tablet 25-Hydroxy 02/08/2019 39.8     Sodium 02/08/2019 144     Potassium 02/08/2019 4.7     Chloride 02/08/2019 103     CO2 02/08/2019 28     Anion Gap 02/08/2019 13     Glucose 02/08/2019 97     BUN 02/08/2019 16     CREATININE 02/08/2019 0.9     GFR Non- 02/08/2019 >60     GFR  02/08/2019 >60     Calcium 02/08/2019 10.0     Total Protein 02/08/2019 7.1     Alb 02/08/2019 4.7     Albumin/Globulin Ratio 02/08/2019 2.0     Total Bilirubin 02/08/2019 0.4     Alkaline Phosphatase 02/08/2019 93     ALT 02/08/2019 15     AST 02/08/2019 16     Globulin 02/08/2019 2.4     Uric Acid, Serum 02/08/2019 5.6     PSA 02/08/2019 <0.01     TSH 02/08/2019 5.51*    Magnesium 02/08/2019 2.00     Phosphorus 02/08/2019 3.6            Assessment:       58 y.o. patient with planned surgery as above. Known risk factors for perioperative complications: Hypertension, Obstructive sleep apnea, Tobacco abuse  Current medications which may produce withdrawal symptoms if withheld perioperatively: none     1. Pre-op exam         Plan:     1. Preoperative workup as follows: hemoglobin, hematocrit, electrolytes, creatinine, glucose, liver function studies, urinalysis (urinary tract instrumentation planned)  2. Change in medication regimen before surgery: Hold all medications on morning of surgery, Discontinue fish oil 5 days before surgery  3.  Prophylaxis for cardiac events with perioperative beta-blockers: Not indicated  ACC/AHA indications for pre-operative beta-blocker use:    · Vascular surgery with history of postitive stress test  · Intermediate or high risk surgery with history of CAD   · Intermediate or high risk surgery with multiple clinical predictors of CAD- 2 of the following: history of compensated or prior heart failure, history of cerebrovascular disease, DM, or renal insufficiency    Routine administration of higher-dose, long-acting metoprolol in beta-blocker-naïve patients on the day of surgery, and in the absence of dose titration is associated with an overall increase in mortality. Beta-blockers should be started days to weeks prior to surgery and titrated to pulse < 70.  4. Deep vein thrombosis prophylaxis: regimen to be chosen by surgical team  5. No contraindications to planned surgery pending appropriate blood work      If you have questions, please do not hesitate to call me ([phone]).      Sincerely,                Won Bobby, CNP

## 2019-07-10 LAB
A/G RATIO: 2 (ref 1.1–2.2)
ALBUMIN SERPL-MCNC: 5 G/DL (ref 3.4–5)
ALP BLD-CCNC: 98 U/L (ref 40–129)
ALT SERPL-CCNC: 25 U/L (ref 10–40)
ANION GAP SERPL CALCULATED.3IONS-SCNC: 18 MMOL/L (ref 3–16)
AST SERPL-CCNC: 23 U/L (ref 15–37)
BILIRUB SERPL-MCNC: 0.3 MG/DL (ref 0–1)
BUN BLDV-MCNC: 16 MG/DL (ref 7–20)
CALCIUM SERPL-MCNC: 10.3 MG/DL (ref 8.3–10.6)
CHLORIDE BLD-SCNC: 103 MMOL/L (ref 99–110)
CO2: 22 MMOL/L (ref 21–32)
CREAT SERPL-MCNC: 1 MG/DL (ref 0.8–1.3)
GFR AFRICAN AMERICAN: >60
GFR NON-AFRICAN AMERICAN: >60
GLOBULIN: 2.5 G/DL
GLUCOSE BLD-MCNC: 84 MG/DL (ref 70–99)
MAGNESIUM: 2.1 MG/DL (ref 1.8–2.4)
POTASSIUM SERPL-SCNC: 4.5 MMOL/L (ref 3.5–5.1)
SODIUM BLD-SCNC: 143 MMOL/L (ref 136–145)
TOTAL PROTEIN: 7.5 G/DL (ref 6.4–8.2)

## 2019-07-19 RX ORDER — SIMVASTATIN 20 MG
TABLET ORAL
Qty: 30 TABLET | Refills: 0 | Status: SHIPPED | OUTPATIENT
Start: 2019-07-19 | End: 2019-09-20 | Stop reason: SDUPTHER

## 2019-07-25 ENCOUNTER — TELEPHONE (OUTPATIENT)
Dept: FAMILY MEDICINE CLINIC | Age: 64
End: 2019-07-25

## 2019-08-02 RX ORDER — ENALAPRIL MALEATE 20 MG/1
TABLET ORAL
Qty: 60 TABLET | Refills: 11 | Status: SHIPPED | OUTPATIENT
Start: 2019-08-02 | End: 2020-08-24

## 2019-08-02 RX ORDER — NIACIN 1000 MG/1
1000 TABLET, EXTENDED RELEASE ORAL NIGHTLY
Qty: 30 TABLET | Refills: 11 | Status: SHIPPED | OUTPATIENT
Start: 2019-08-02 | End: 2020-07-23

## 2019-08-06 ENCOUNTER — OFFICE VISIT (OUTPATIENT)
Dept: FAMILY MEDICINE CLINIC | Age: 64
End: 2019-08-06
Payer: COMMERCIAL

## 2019-08-06 VITALS
BODY MASS INDEX: 34.53 KG/M2 | WEIGHT: 241.2 LBS | OXYGEN SATURATION: 96 % | HEART RATE: 72 BPM | DIASTOLIC BLOOD PRESSURE: 70 MMHG | HEIGHT: 70 IN | SYSTOLIC BLOOD PRESSURE: 118 MMHG

## 2019-08-06 DIAGNOSIS — M15.9 PRIMARY OSTEOARTHRITIS INVOLVING MULTIPLE JOINTS: ICD-10-CM

## 2019-08-06 DIAGNOSIS — K21.9 GASTROESOPHAGEAL REFLUX DISEASE WITHOUT ESOPHAGITIS: ICD-10-CM

## 2019-08-06 DIAGNOSIS — R73.9 HYPERGLYCEMIA: ICD-10-CM

## 2019-08-06 DIAGNOSIS — Z72.0 TOBACCO ABUSE: ICD-10-CM

## 2019-08-06 DIAGNOSIS — G47.33 OSA TREATED WITH BIPAP: ICD-10-CM

## 2019-08-06 DIAGNOSIS — E78.2 MIXED HYPERLIPIDEMIA: ICD-10-CM

## 2019-08-06 DIAGNOSIS — I10 ESSENTIAL HYPERTENSION, BENIGN: Primary | ICD-10-CM

## 2019-08-06 LAB — PROSTATE SPECIFIC ANTIGEN: <0.01 NG/ML (ref 0–4)

## 2019-08-06 PROCEDURE — 99214 OFFICE O/P EST MOD 30 MIN: CPT | Performed by: FAMILY MEDICINE

## 2019-08-06 RX ORDER — SULINDAC 150 MG/1
TABLET ORAL
COMMUNITY
Start: 2019-08-02 | End: 2020-10-19 | Stop reason: ALTCHOICE

## 2019-08-06 NOTE — PROGRESS NOTES
Social connections:     Talks on phone: Not on file     Gets together: Not on file     Attends Baptism service: Not on file     Active member of club or organization: Not on file     Attends meetings of clubs or organizations: Not on file     Relationship status: Not on file    Intimate partner violence:     Fear of current or ex partner: Not on file     Emotionally abused: Not on file     Physically abused: Not on file     Forced sexual activity: Not on file   Other Topics Concern    Not on file   Social History Narrative    Not on file       Prior to Visit Medications    Medication Sig Taking? Authorizing Provider   enalapril (VASOTEC) 20 MG tablet TAKE 1 TABLET BY MOUTH 2 TIMES DAILY Yes Randa Lester MD   niacin (NIASPAN) 1000 MG extended release tablet TAKE 1 TABLET BY MOUTH NIGHTLY Yes Randa Lester MD   simvastatin (ZOCOR) 20 MG tablet TAKE 1 TABLET AT BEDTIME Yes KARLOS Cool CNP   fluticasone (FLONASE) 50 MCG/ACT nasal spray 1 spray by Nasal route daily Yes KARLOS Cool CNP   Cholecalciferol (VITAMIN D3) 1000 units TABS TAKE 2 TABLETS DAILY Yes Randa Lester MD   ranitidine (ZANTAC) 300 MG tablet TAKE 1 TABLET BY MOUTH NIGHTLY Yes Ranad Lester MD   Omega-3 Fatty Acids (FISH OIL) 1000 MG CAPS TAKE 6 CAPSULES TWICE DAILY Yes KARLOS Cool CNP   Ferrous Sulfate (IRON) 325 (65 Fe) MG TABS TAKE ONE TABLET BY MOUTH DAILY Yes Randa Lester MD   Biotin 1000 MCG TABS Take 1 tablet by mouth daily  Yes Historical Provider, MD   allopurinol (ZYLOPRIM) 100 MG tablet Take 200 mg by mouth daily  Yes Historical Provider, MD   Misc Natural Products (OSTEO BI-FLEX TRIPLE STRENGTH PO) Take 2 capsules by mouth daily.  Yes Historical Provider, MD   sulindac (CLINORIL) 150 MG tablet   Historical Provider, MD   zoster recombinant adjuvanted vaccine (SHINGRIX) 50 MCG/0.5ML SUSR injection 1 dose now and repeat in 2-6

## 2019-08-16 ENCOUNTER — TELEPHONE (OUTPATIENT)
Dept: CASE MANAGEMENT | Age: 64
End: 2019-08-16

## 2019-08-16 NOTE — TELEPHONE ENCOUNTER
Patient scheduled for CT Lung screening 8/17/2019. Called patient to verify smoking history-patient meets criteria for lung cancer screening. Patient aware and verbalized appt. Date/time. Denies any questions about the screening.

## 2019-08-17 ENCOUNTER — HOSPITAL ENCOUNTER (OUTPATIENT)
Dept: CT IMAGING | Age: 64
Discharge: HOME | End: 2019-08-17
Payer: COMMERCIAL

## 2019-08-17 DIAGNOSIS — Z72.0 TOBACCO ABUSE: ICD-10-CM

## 2019-08-17 PROCEDURE — G0297 LDCT FOR LUNG CA SCREEN: HCPCS

## 2019-12-02 ENCOUNTER — OFFICE VISIT (OUTPATIENT)
Dept: PULMONOLOGY | Age: 64
End: 2019-12-02
Payer: COMMERCIAL

## 2019-12-02 VITALS
HEART RATE: 70 BPM | BODY MASS INDEX: 35.36 KG/M2 | WEIGHT: 247 LBS | OXYGEN SATURATION: 96 % | DIASTOLIC BLOOD PRESSURE: 66 MMHG | TEMPERATURE: 98.2 F | RESPIRATION RATE: 16 BRPM | SYSTOLIC BLOOD PRESSURE: 117 MMHG | HEIGHT: 70 IN

## 2019-12-02 DIAGNOSIS — R53.83 OTHER FATIGUE: ICD-10-CM

## 2019-12-02 DIAGNOSIS — G47.33 SEVERE OBSTRUCTIVE SLEEP APNEA: Primary | ICD-10-CM

## 2019-12-02 DIAGNOSIS — E66.9 OBESITY (BMI 30-39.9): ICD-10-CM

## 2019-12-02 PROCEDURE — 99213 OFFICE O/P EST LOW 20 MIN: CPT | Performed by: NURSE PRACTITIONER

## 2019-12-02 ASSESSMENT — SLEEP AND FATIGUE QUESTIONNAIRES
HOW LIKELY ARE YOU TO NOD OFF OR FALL ASLEEP WHEN YOU ARE A PASSENGER IN A CAR FOR AN HOUR WITHOUT A BREAK: 3
ESS TOTAL SCORE: 12
HOW LIKELY ARE YOU TO NOD OFF OR FALL ASLEEP WHILE LYING DOWN TO REST IN THE AFTERNOON WHEN CIRCUMSTANCES PERMIT: 0
HOW LIKELY ARE YOU TO NOD OFF OR FALL ASLEEP IN A CAR, WHILE STOPPED FOR A FEW MINUTES IN TRAFFIC: 0
HOW LIKELY ARE YOU TO NOD OFF OR FALL ASLEEP WHILE SITTING QUIETLY AFTER LUNCH WITHOUT ALCOHOL: 3
HOW LIKELY ARE YOU TO NOD OFF OR FALL ASLEEP WHILE SITTING AND READING: 2
HOW LIKELY ARE YOU TO NOD OFF OR FALL ASLEEP WHILE WATCHING TV: 3
NECK CIRCUMFERENCE (INCHES): 20
HOW LIKELY ARE YOU TO NOD OFF OR FALL ASLEEP WHILE SITTING INACTIVE IN A PUBLIC PLACE: 1
HOW LIKELY ARE YOU TO NOD OFF OR FALL ASLEEP WHILE SITTING AND TALKING TO SOMEONE: 0

## 2020-01-06 ENCOUNTER — TELEPHONE (OUTPATIENT)
Dept: FAMILY MEDICINE CLINIC | Age: 65
End: 2020-01-06

## 2020-01-07 NOTE — TELEPHONE ENCOUNTER
Received DENIAL for Niacin ER (Antihyperlipidemic) 1000MG er tablets, letter attached. Please notify patient, thank you.

## 2020-03-30 RX ORDER — SIMVASTATIN 20 MG
TABLET ORAL
Qty: 30 TABLET | Refills: 5 | Status: SHIPPED | OUTPATIENT
Start: 2020-03-30 | End: 2020-10-08 | Stop reason: SDUPTHER

## 2020-05-03 ENCOUNTER — APPOINTMENT (OUTPATIENT)
Dept: CT IMAGING | Age: 65
End: 2020-05-03
Payer: MEDICARE

## 2020-05-03 ENCOUNTER — HOSPITAL ENCOUNTER (EMERGENCY)
Age: 65
Discharge: HOME | End: 2020-05-03
Attending: EMERGENCY MEDICINE
Payer: MEDICARE

## 2020-05-03 VITALS
HEIGHT: 70 IN | BODY MASS INDEX: 34.36 KG/M2 | HEART RATE: 65 BPM | TEMPERATURE: 98.4 F | RESPIRATION RATE: 16 BRPM | OXYGEN SATURATION: 97 % | WEIGHT: 240 LBS | SYSTOLIC BLOOD PRESSURE: 126 MMHG | DIASTOLIC BLOOD PRESSURE: 72 MMHG

## 2020-05-03 LAB
ANION GAP SERPL CALCULATED.3IONS-SCNC: 11 MMOL/L (ref 3–16)
BASOPHILS ABSOLUTE: 0 K/UL (ref 0–0.2)
BASOPHILS RELATIVE PERCENT: 0.6 %
BILIRUBIN URINE: NEGATIVE
BLOOD, URINE: ABNORMAL
BUN BLDV-MCNC: 21 MG/DL (ref 7–20)
CALCIUM SERPL-MCNC: 9.1 MG/DL (ref 8.3–10.6)
CHLORIDE BLD-SCNC: 103 MMOL/L (ref 99–110)
CLARITY: CLEAR
CO2: 26 MMOL/L (ref 21–32)
COLOR: YELLOW
CREAT SERPL-MCNC: 1.3 MG/DL (ref 0.8–1.3)
EOSINOPHILS ABSOLUTE: 0.2 K/UL (ref 0–0.6)
EOSINOPHILS RELATIVE PERCENT: 2.1 %
EPITHELIAL CELLS, UA: NORMAL /HPF (ref 0–5)
GFR AFRICAN AMERICAN: >60
GFR NON-AFRICAN AMERICAN: 55
GLUCOSE BLD-MCNC: 105 MG/DL (ref 70–99)
GLUCOSE URINE: NEGATIVE MG/DL
HCT VFR BLD CALC: 39.1 % (ref 40.5–52.5)
HEMOGLOBIN: 13.1 G/DL (ref 13.5–17.5)
KETONES, URINE: NEGATIVE MG/DL
LEUKOCYTE ESTERASE, URINE: NEGATIVE
LYMPHOCYTES ABSOLUTE: 1.3 K/UL (ref 1–5.1)
LYMPHOCYTES RELATIVE PERCENT: 16.6 %
MCH RBC QN AUTO: 31.6 PG (ref 26–34)
MCHC RBC AUTO-ENTMCNC: 33.6 G/DL (ref 31–36)
MCV RBC AUTO: 94 FL (ref 80–100)
MICROSCOPIC EXAMINATION: YES
MONOCYTES ABSOLUTE: 0.7 K/UL (ref 0–1.3)
MONOCYTES RELATIVE PERCENT: 9 %
NEUTROPHILS ABSOLUTE: 5.5 K/UL (ref 1.7–7.7)
NEUTROPHILS RELATIVE PERCENT: 71.7 %
NITRITE, URINE: NEGATIVE
PDW BLD-RTO: 13.6 % (ref 12.4–15.4)
PH UA: 7 (ref 5–8)
PLATELET # BLD: 213 K/UL (ref 135–450)
PMV BLD AUTO: 8.7 FL (ref 5–10.5)
POTASSIUM REFLEX MAGNESIUM: 4.9 MMOL/L (ref 3.5–5.1)
PROTEIN UA: NEGATIVE MG/DL
RBC # BLD: 4.15 M/UL (ref 4.2–5.9)
RBC UA: NORMAL /HPF (ref 0–4)
SODIUM BLD-SCNC: 140 MMOL/L (ref 136–145)
SPECIFIC GRAVITY UA: 1.02 (ref 1–1.03)
URINE REFLEX TO CULTURE: ABNORMAL
URINE TYPE: ABNORMAL
UROBILINOGEN, URINE: 0.2 E.U./DL
WBC # BLD: 7.7 K/UL (ref 4–11)
WBC UA: NORMAL /HPF (ref 0–5)

## 2020-05-03 PROCEDURE — 2580000003 HC RX 258: Performed by: EMERGENCY MEDICINE

## 2020-05-03 PROCEDURE — 96374 THER/PROPH/DIAG INJ IV PUSH: CPT

## 2020-05-03 PROCEDURE — 85025 COMPLETE CBC W/AUTO DIFF WBC: CPT

## 2020-05-03 PROCEDURE — 99284 EMERGENCY DEPT VISIT MOD MDM: CPT

## 2020-05-03 PROCEDURE — 6360000002 HC RX W HCPCS: Performed by: EMERGENCY MEDICINE

## 2020-05-03 PROCEDURE — 74176 CT ABD & PELVIS W/O CONTRAST: CPT

## 2020-05-03 PROCEDURE — 81001 URINALYSIS AUTO W/SCOPE: CPT

## 2020-05-03 PROCEDURE — 80048 BASIC METABOLIC PNL TOTAL CA: CPT

## 2020-05-03 RX ORDER — KETOROLAC TROMETHAMINE 30 MG/ML
15 INJECTION, SOLUTION INTRAMUSCULAR; INTRAVENOUS ONCE
Status: COMPLETED | OUTPATIENT
Start: 2020-05-03 | End: 2020-05-03

## 2020-05-03 RX ORDER — HYDROCODONE BITARTRATE AND ACETAMINOPHEN 5; 325 MG/1; MG/1
1 TABLET ORAL EVERY 4 HOURS PRN
Qty: 18 TABLET | Refills: 0 | Status: SHIPPED | OUTPATIENT
Start: 2020-05-03 | End: 2020-05-06

## 2020-05-03 RX ORDER — 0.9 % SODIUM CHLORIDE 0.9 %
1000 INTRAVENOUS SOLUTION INTRAVENOUS ONCE
Status: COMPLETED | OUTPATIENT
Start: 2020-05-03 | End: 2020-05-03

## 2020-05-03 RX ORDER — TAMSULOSIN HYDROCHLORIDE 0.4 MG/1
0.4 CAPSULE ORAL DAILY
Qty: 7 CAPSULE | Refills: 0 | Status: SHIPPED | OUTPATIENT
Start: 2020-05-03 | End: 2020-10-19

## 2020-05-03 RX ADMIN — KETOROLAC TROMETHAMINE 15 MG: 30 INJECTION, SOLUTION INTRAMUSCULAR at 09:11

## 2020-05-03 RX ADMIN — SODIUM CHLORIDE 1000 ML: 9 INJECTION, SOLUTION INTRAVENOUS at 09:10

## 2020-05-03 ASSESSMENT — PAIN DESCRIPTION - ORIENTATION: ORIENTATION: LEFT

## 2020-05-03 ASSESSMENT — PAIN SCALES - GENERAL
PAINLEVEL_OUTOF10: 7
PAINLEVEL_OUTOF10: 7
PAINLEVEL_OUTOF10: 0
PAINLEVEL_OUTOF10: 7

## 2020-05-03 ASSESSMENT — PAIN DESCRIPTION - DESCRIPTORS: DESCRIPTORS: SHARP

## 2020-05-03 ASSESSMENT — PAIN DESCRIPTION - PAIN TYPE
TYPE: ACUTE PAIN
TYPE: ACUTE PAIN

## 2020-05-03 ASSESSMENT — PAIN DESCRIPTION - FREQUENCY: FREQUENCY: INTERMITTENT

## 2020-05-03 ASSESSMENT — PAIN DESCRIPTION - LOCATION: LOCATION: FLANK

## 2020-05-03 NOTE — ED NOTES
Discharge instructions reviewed with pt. He verbalized understanding. Pt ambulated out of ED in stable condition.       Ev Jett RN  05/03/20 0639

## 2020-05-03 NOTE — ED NOTES
Pt had question about \"hernias. \"  States he has been told he has a hernia at right side of abd near belly button s/p surgery. States its not painful moreover it bothers him because he and others can see it with a shirt on. Of note, the area is not res or inflamed. No pain or redness noted.  He states he is going to flow up with his primary md.     Kathryn Lucas  05/03/20 8817

## 2020-05-03 NOTE — ED TRIAGE NOTES
Chief Complaint   Patient presents with    Flank Pain     pt c/o left side flank pain onset early this am, denies urinary symptoms, hx of kidney stones

## 2020-05-03 NOTE — ED PROVIDER NOTES
[address] [facility]  EMERGENCY DEPARTMENT ENCOUNTER      Pt Name: Joyce Asher  MRN: [id number]  Armsmargauxgfleon[address] 1955  Date of evaluation: 5/3/2020  Provider: Eduarda Callejas MD    CHIEF COMPLAINT       Chief Complaint   Patient presents with    Flank Pain     pt c/o left side flank pain onset early this am, denies urinary symptoms, hx of kidney stones         HISTORY OF PRESENT ILLNESS   (Location/Symptom, Timing/Onset, Context/Setting, Quality, Duration, Modifying Factors, Severity)  Note limiting factors. Joyce Asher is a 71 y.o. male with past medical history of hypertension, hyperlipidemia and kidney stones here today with flank pain. Patient states that for the last 24 hours she has had a sharp stabbing pain in the left flank radiating to the left lower abdomen into his testicle. He did note some pink-tinged urine recently. Denies dysuria. No fevers. No chest pain, cough or shortness of breath. Pain is moderate to severe. No alleviating factors. States it feels like prior kidney stones    HPI    Nursing Notes were reviewed. REVIEW OF SYSTEMS    (2-9 systems for level 4, 10 or more for level 5)     Review of Systems    Please see HPI for pertinent positive and negative review of system findings. A full 10 system ROS was performed and otherwise negative.         PAST MEDICAL HISTORY     Past Medical History:   Diagnosis Date    Arthritis     CTS (carpal tunnel syndrome)     Erectile dysfunction     GERD (gastroesophageal reflux disease)     Hyperglycemia     Hyperlipidemia     Hypertension     Kidney stone 09/2016    AJ treated with BiPAP     Osteoarthritis     Trigger thumb     Vitamin D deficiency          SURGICAL HISTORY       Past Surgical History:   Procedure Laterality Date    APPENDECTOMY      BACK SURGERY      CARPAL TUNNEL RELEASE  11/12    right    COLONOSCOPY  08/14/2018    diverticulosis    CYSTOSCOPY Right 09/28/2016     RIGHT URETEROSCOPY , RIGHT History    None   Social Needs    Financial resource strain: None    Food insecurity     Worry: None     Inability: None    Transportation needs     Medical: None     Non-medical: None   Tobacco Use    Smoking status: Current Every Day Smoker     Packs/day: 1.00     Years: 30.00     Pack years: 30.00     Types: Cigarettes     Start date: 1/1/1989    Smokeless tobacco: Never Used   Substance and Sexual Activity    Alcohol use: Yes     Alcohol/week: 0.0 standard drinks     Comment: occasionally -2 x month    Drug use: No    Sexual activity: Yes     Partners: Female   Lifestyle    Physical activity     Days per week: None     Minutes per session: None    Stress: None   Relationships    Social connections     Talks on phone: None     Gets together: None     Attends Zoroastrian service: None     Active member of club or organization: None     Attends meetings of clubs or organizations: None     Relationship status: None    Intimate partner violence     Fear of current or ex partner: None     Emotionally abused: None     Physically abused: None     Forced sexual activity: None   Other Topics Concern    None   Social History Narrative    None       SCREENINGS               PHYSICAL EXAM    (up to 7 for level 4, 8 or more for level 5)     ED Triage Vitals [05/03/20 0845]   BP Temp Temp Source Pulse Resp SpO2 Height Weight   133/81 98.4 °F (36.9 °C) Oral 65 16 96 % 5' 10\" (1.778 m) 240 lb (108.9 kg)       Physical Exam    General appearance:  Cooperative. Uncomfortable appearing  Skin:  Warm. Dry. Eye:  Extraocular movements intact. Ears, nose, mouth and throat:  Oral mucosa moist,  Neck:  Trachea midline. Heart:  Regular rate and rhythm  Perfusion:  intact  Respiratory:  Lungs clear to auscultation bilaterally. Respirations nonlabored. Abdominal:   Non distended. Nontender, but left CVA tenderness noted  Neurological:  Alert and oriented x 3.   Moves all extremities

## 2020-05-08 ENCOUNTER — TELEPHONE (OUTPATIENT)
Dept: FAMILY MEDICINE CLINIC | Age: 65
End: 2020-05-08

## 2020-05-08 RX ORDER — OMEPRAZOLE 40 MG/1
40 CAPSULE, DELAYED RELEASE ORAL
Qty: 90 CAPSULE | Refills: 1 | Status: SHIPPED | OUTPATIENT
Start: 2020-05-08 | End: 2020-11-18

## 2020-06-01 RX ORDER — CHLORAL HYDRATE 500 MG
1000 CAPSULE ORAL 2 TIMES DAILY
Qty: 360 CAPSULE | Refills: 1 | Status: SHIPPED | OUTPATIENT
Start: 2020-06-01 | End: 2021-12-27

## 2020-07-23 RX ORDER — NIACIN 1000 MG/1
TABLET, EXTENDED RELEASE ORAL
Qty: 30 TABLET | Refills: 0 | Status: SHIPPED | OUTPATIENT
Start: 2020-07-23 | End: 2020-08-17

## 2020-08-07 RX ORDER — VITAMIN B COMPLEX
TABLET ORAL
Qty: 100 TABLET | Refills: 0 | Status: SHIPPED | OUTPATIENT
Start: 2020-08-07 | End: 2022-01-25

## 2020-08-17 ENCOUNTER — HOSPITAL ENCOUNTER (OUTPATIENT)
Age: 65
Discharge: HOME | End: 2020-08-17
Payer: MEDICARE

## 2020-08-17 PROCEDURE — 36415 COLL VENOUS BLD VENIPUNCTURE: CPT

## 2020-08-17 PROCEDURE — 84153 ASSAY OF PSA TOTAL: CPT

## 2020-08-17 RX ORDER — NIACIN 1000 MG/1
TABLET, EXTENDED RELEASE ORAL
Qty: 30 TABLET | Refills: 0 | Status: SHIPPED | OUTPATIENT
Start: 2020-08-17 | End: 2020-09-21

## 2020-08-18 LAB — PROSTATE SPECIFIC ANTIGEN: <0.01 NG/ML (ref 0–4)

## 2020-08-24 RX ORDER — ENALAPRIL MALEATE 20 MG/1
TABLET ORAL
Qty: 60 TABLET | Refills: 0 | Status: SHIPPED | OUTPATIENT
Start: 2020-08-24 | End: 2020-09-21

## 2020-09-21 RX ORDER — ENALAPRIL MALEATE 20 MG/1
TABLET ORAL
Qty: 60 TABLET | Refills: 0 | Status: SHIPPED | OUTPATIENT
Start: 2020-09-21 | End: 2020-10-23

## 2020-09-21 RX ORDER — NIACIN 1000 MG/1
TABLET, EXTENDED RELEASE ORAL
Qty: 30 TABLET | Refills: 0 | Status: SHIPPED | OUTPATIENT
Start: 2020-09-21 | End: 2020-10-19

## 2020-10-08 RX ORDER — SIMVASTATIN 20 MG
TABLET ORAL
Qty: 30 TABLET | Refills: 0 | Status: SHIPPED | OUTPATIENT
Start: 2020-10-08 | End: 2020-11-06

## 2020-10-19 ENCOUNTER — OFFICE VISIT (OUTPATIENT)
Dept: FAMILY MEDICINE CLINIC | Age: 65
End: 2020-10-19
Payer: MEDICARE

## 2020-10-19 VITALS
DIASTOLIC BLOOD PRESSURE: 64 MMHG | BODY MASS INDEX: 35.79 KG/M2 | HEIGHT: 70 IN | OXYGEN SATURATION: 92 % | WEIGHT: 250 LBS | HEART RATE: 75 BPM | SYSTOLIC BLOOD PRESSURE: 124 MMHG | TEMPERATURE: 98.1 F

## 2020-10-19 PROBLEM — K42.9 UMBILICAL HERNIA WITHOUT OBSTRUCTION AND WITHOUT GANGRENE: Status: ACTIVE | Noted: 2020-10-19

## 2020-10-19 PROBLEM — R39.9 LOWER URINARY TRACT SYMPTOMS: Status: ACTIVE | Noted: 2020-10-19

## 2020-10-19 PROBLEM — F51.01 PRIMARY INSOMNIA: Status: ACTIVE | Noted: 2020-10-19

## 2020-10-19 PROCEDURE — G8427 DOCREV CUR MEDS BY ELIG CLIN: HCPCS | Performed by: FAMILY MEDICINE

## 2020-10-19 PROCEDURE — G0296 VISIT TO DETERM LDCT ELIG: HCPCS | Performed by: FAMILY MEDICINE

## 2020-10-19 PROCEDURE — 1123F ACP DISCUSS/DSCN MKR DOCD: CPT | Performed by: FAMILY MEDICINE

## 2020-10-19 PROCEDURE — G8417 CALC BMI ABV UP PARAM F/U: HCPCS | Performed by: FAMILY MEDICINE

## 2020-10-19 PROCEDURE — 3017F COLORECTAL CA SCREEN DOC REV: CPT | Performed by: FAMILY MEDICINE

## 2020-10-19 PROCEDURE — G0008 ADMIN INFLUENZA VIRUS VAC: HCPCS | Performed by: FAMILY MEDICINE

## 2020-10-19 PROCEDURE — G8482 FLU IMMUNIZE ORDER/ADMIN: HCPCS | Performed by: FAMILY MEDICINE

## 2020-10-19 PROCEDURE — 90686 IIV4 VACC NO PRSV 0.5 ML IM: CPT | Performed by: FAMILY MEDICINE

## 2020-10-19 PROCEDURE — 99214 OFFICE O/P EST MOD 30 MIN: CPT | Performed by: FAMILY MEDICINE

## 2020-10-19 PROCEDURE — 4004F PT TOBACCO SCREEN RCVD TLK: CPT | Performed by: FAMILY MEDICINE

## 2020-10-19 PROCEDURE — 4040F PNEUMOC VAC/ADMIN/RCVD: CPT | Performed by: FAMILY MEDICINE

## 2020-10-19 RX ORDER — DICLOFENAC SODIUM 75 MG/1
75 TABLET, DELAYED RELEASE ORAL 2 TIMES DAILY
Qty: 60 TABLET | Refills: 11 | Status: SHIPPED | OUTPATIENT
Start: 2020-10-19 | End: 2021-10-24

## 2020-10-19 RX ORDER — NIACIN 1000 MG/1
TABLET, EXTENDED RELEASE ORAL
Qty: 30 TABLET | Refills: 0 | Status: SHIPPED | OUTPATIENT
Start: 2020-10-19 | End: 2020-11-18

## 2020-10-19 ASSESSMENT — PATIENT HEALTH QUESTIONNAIRE - PHQ9
SUM OF ALL RESPONSES TO PHQ QUESTIONS 1-9: 0
2. FEELING DOWN, DEPRESSED OR HOPELESS: 0
SUM OF ALL RESPONSES TO PHQ9 QUESTIONS 1 & 2: 0
1. LITTLE INTEREST OR PLEASURE IN DOING THINGS: 0

## 2020-10-19 NOTE — PATIENT INSTRUCTIONS
Stop the Sulindac and start Diclofenac twice a day for aching. Patient should call the office immediately with new or ongoing signs or symptoms or worsening, or proceed to the emergency room. If you are on medications which could impair your senses, you are at risk of weakness, falls, dizziness, or drowsiness. You should be careful during activities which could place you at risk of harm, such as climbing, using stairs, operating machinery, or driving vehicles. If you feel you cannot safely do these activities, you should request others to help you, or avoid the activities altogether. If you are drowsy for any other reason, you should use the same precautions as listed above. What is lung cancer screening? Lung cancer screening is a way in which doctors check the lungs for early signs of cancer in people who have no symptoms of lung cancer. A low-dose CT scan uses much less radiation than a normal CT scan and shows a more detailed image of the lungs than a standard X-ray. The goal of lung cancer screening is to find cancer early, before it has a chance to grow, spread, or cause problems. One large study found that smokers who were screened with low-dose CT scans were less likely to die of lung cancer than those who were screened with standard X-ray. Below is a summary of the things you need to know regarding screening for lung cancer with low-dose computed tomography (LDCT). This is a screening program that involves routine annual screening with LDCT studies of the lung. The LDCTs are done using low-dose radiation that is not thought to increase your cancer risk. If you have other serious medical conditions (other cancers, congestive heart failure) that limit your life expectancy to less than 10 years, you should not undergo lung cancer screening with LDCT. The chance is 20%-60% that the LDCT result will show abnormalities.   This would require additional testing which could include repeat

## 2020-10-19 NOTE — PROGRESS NOTES
Chief Complaint   Patient presents with    Hypertension    Hyperlipidemia    Gout    Sleep Apnea    Other     patient has multiple medical complaints     He complains of general body aches. Feels like he has a pulled muscle in his upper right leg. Patient is wearing his CPAP daily. Patient wakes at night and is aching. If the Diclofenac does not help then let us know and we will prescribe something else to help him rest. Complains of frequent urination. No longer on Flomax. Offered to restart but would like to wait. HPI:  Eward Room is a 71 y.o. (: 1955) here today for    Treatment Adherence:   Medication compliance:  compliant all of the time  Diet compliance:  eats what he wants  Weight trend: increasing  Current exercise: no regular exercise  Barriers: lack of motivation    Hypertension:  Home blood pressure monitoring: Yes - checks sometimes and is normal. Patient denies chest pain and shortness of breath. Antihypertensive medication side effects: no medication side effects noted. Use of agents associated with hypertension: none. Gout: no recent flare ups                                      Sodium (mmol/L)   Date Value   2020 140    BUN (mg/dL)   Date Value   2020 21 (H)    Glucose (mg/dL)   Date Value   2020 105 (H)      Potassium reflex Magnesium (mmol/L)   Date Value   2020 4.9    CREATININE (mg/dL)   Date Value   2020 1.3         Hyperlipidemia:  No new myalgias or GI upset on simvastatin (Zocor).      Lab Results   Component Value Date    CHOL 120 2019    TRIG 89 2019    HDL 37 (L) 2019    LDLCALC 65 2019     Lab Results   Component Value Date    ALT 25 2019    AST 23 2019          Patient's medications, allergies, past medical, surgical, social and family histories were reviewed and updated asappropriate on 10/19/2020 at 12:49 PM.    ROS:  Review of Systems    All other systems reviewed and are negative except as noted above on 10/19/2020 at 12:49 PM. Additional review of systems may [facility] Any responses requiring further intervention were pursued. Hemoglobin A1C (%)   Date Value   08/07/2018 5.6     Microscopic Examination (no units)   Date Value   05/03/2020 YES     LDL Calculated (mg/dL)   Date Value   02/08/2019 65     Past Medical History:   Diagnosis Date    Arthritis     CTS (carpal tunnel syndrome)     Erectile dysfunction     GERD (gastroesophageal reflux disease)     Hyperglycemia     Hyperlipidemia     Hypertension     Kidney stone 09/2016    AJ treated with BiPAP     Osteoarthritis     Trigger thumb     Vitamin D deficiency      Family History   Problem Relation Age of Onset    Heart Disease Mother     High Blood Pressure Mother     Arthritis Mother     Heart Disease Father     Arthritis Father      Social History     Socioeconomic History    Marital status:      Spouse name: Not on file    Number of children: Not on file    Years of education: Not on file    Highest education level: Not on file   Occupational History    Not on file   Social Needs    Financial resource strain: Not on file    Food insecurity     Worry: Not on file     Inability: Not on file    Transportation needs     Medical: Not on file     Non-medical: Not on file   Tobacco Use    Smoking status: Current Every Day Smoker     Packs/day: 1.00     Years: 30.00     Pack years: 30.00     Types: Cigarettes     Start date: 1/1/1989    Smokeless tobacco: Never Used   Substance and Sexual Activity    Alcohol use:  Yes     Alcohol/week: 0.0 standard drinks     Comment: occasionally -2 x month    Drug use: No    Sexual activity: Yes     Partners: Female   Lifestyle    Physical activity     Days per week: Not on file     Minutes per session: Not on file    Stress: Not on file   Relationships    Social connections     Talks on phone: Not on file     Gets together: Not on file     Attends [employer] service: Not on file     Active member of club or organization: Not on file     Attends meetings of clubs or organizations: Not on file     Relationship status: Not on file    Intimate partner violence     Fear of current or ex partner: Not on file     Emotionally abused: Not on file     Physically abused: Not on file     Forced sexual activity: Not on file   Other Topics Concern    Not on file   Social History Narrative    Not on file       Prior to Visit Medications    Medication Sig Taking?  Authorizing Provider   simvastatin (ZOCOR) 20 MG tablet TAKE 1 TABLET AT BEDTIME  KARLOS Nunes CNP   enalapril (VASOTEC) 20 MG tablet TAKE 1 TABLET BY MOUTH 2 TIMES DAILY  Giancarlo Chaney MD   niacin (NIASPAN) 1000 MG extended release tablet TAKE ONE TABLET BY MOUTH NIGHTLY  Giancarlo Chaney MD   Vitamin D (CHOLECALCIFEROL) 25 MCG (1000 UT) TABS tablet TAKE 2 TABLETS BY MOUTH DAILY  Giancarlo Chaney MD   Omega-3 Fatty Acids (FISH OIL) 1000 MG CAPS Take 1 capsule by mouth 2 times daily  KARLOS Nunes CNP   omeprazole (PRILOSEC) 40 MG delayed release capsule Take 1 capsule by mouth every morning (before breakfast)  Giancarlo Chaney MD   tamsulosin (FLOMAX) 0.4 MG capsule Take 1 capsule by mouth daily for 7 days  Buster Larkin MD   sulindac (CLINORIL) 150 MG tablet   Historical Provider, MD   fluticasone (FLONASE) 50 MCG/ACT nasal spray 1 spray by Nasal route daily  KARLOS Nunes CNP   ranitidine (ZANTAC) 300 MG tablet TAKE 1 TABLET BY MOUTH NIGHTLY  Giancarlo Chaney MD   zoster recombinant adjuvanted vaccine [facility]) 50 MCG/0.5ML SUSR injection 1 dose now and repeat in 2-6 months  Giancarlo Chaney MD   Ferrous Sulfate (IRON) 325 (65 Fe) MG TABS TAKE ONE TABLET BY MOUTH DAILY  Giancarlo Chaney MD   Biotin 1000 MCG TABS Take 1 tablet by mouth daily   Historical Provider, MD   allopurinol (ZYLOPRIM) 100 MG tablet Take 200 mg by mouth daily   Historical Provider, MD   Misc Natural Products (OSTEO BI-FLEX TRIPLE STRENGTH PO) Take 2 capsules by mouth daily. Historical Provider, MD     Allergies   Allergen Reactions    Suprax [Cefixime] Itching     Swollen hands and itching       OBJECTIVE:  Estimated body mass index is 34.44 kg/m² as calculated from the following:    Height as of this encounter: 5' 10\" (1.778 m). Weight as of 5/3/20: 240 lb (108.9 kg). Vitals:    10/19/20 1247   Height: 5' 10\" (1.778 m)     BP Readings from Last 2 Encounters:   05/03/20 126/72   12/02/19 117/66     Wt Readings from Last 3 Encounters:   05/03/20 240 lb (108.9 kg)   12/02/19 247 lb (112 kg)   08/06/19 241 lb 3.2 oz (109.4 kg)       Physical Exam  Vitals signs and nursing note reviewed. Constitutional:       General: He is not in acute distress. Appearance: He is well-developed. He is not diaphoretic. HENT:      Head: Normocephalic and atraumatic. Right Ear: External ear normal.      Left Ear: External ear normal.      Nose: Nose normal.   Eyes:      General: Lids are normal. No scleral icterus. Right eye: No discharge. Left eye: No discharge. Pupils: Pupils are equal, round, and reactive to light. Neck:      Thyroid: No thyromegaly. Vascular: No JVD. Cardiovascular:      Rate and Rhythm: Normal rate and regular rhythm. Heart sounds: Normal heart sounds. Pulmonary:      Effort: Pulmonary effort is normal. No respiratory distress. Breath sounds: Normal breath sounds. Abdominal:      Palpations: Abdomen is soft. There is no hepatomegaly or splenomegaly. Tenderness: There is no abdominal tenderness. Skin:     General: Skin is warm and dry. Coloration: Skin is not pale. Findings: No erythema or rash. Comments: Turgor normal   Psychiatric:         Behavior: Behavior normal.         Thought Content:  Thought content normal. Judgment: Judgment normal.              ASSESSMENT PLAN      Diagnosis Orders   1. Vitamin D deficiency  VITAMIN D 25 HYDROXY   2. Essential hypertension, benign  COMPREHENSIVE METABOLIC PANEL    CBC WITH AUTO DIFFERENTIAL   3. Hyperglycemia     4. Mixed hyperlipidemia  LIPID PANEL   5. Myalgia  CK   6. Tobacco abuse  CT Lung Screen (Initial or Annual)   7. Hyperuricemia     8. Severe obstructive sleep apnea     9. Gastroesophageal reflux disease without esophagitis     10. Primary osteoarthritis involving multiple joints     11. Umbilical hernia without obstruction and without gangrene     12. Primary insomnia     13. Lower urinary tract symptoms     14. Personal history of tobacco use  RI VISIT TO DISCUSS LUNG CA SCREEN W LDCT    CT Lung Screen (Annual)   Vitamin D levels will be monitored as needed blood pressure acceptable continue lipid monitoring as needed. No symptoms of elevated sugar I believe the muscle discomfort and arthritis go hand-in-hand. Will change from sulindac to diclofenac. Let us know 2 weeks of his pain is not better. Also let us know in 2 weeks if his sleep is not improved because a greater pain relief, if not, we would add trazodone. Tobacco abuse - Pt will not quit despite understanding of risks of continued tobacco use, including cancer, heart attacks, strokes or death. No attacks of gout. Still treats his sleep apnea. Reflux controlled. He has a referral for the umbilical hernia but has not pursued it. He is off of Flomax we offered to put him back on for urinary frequency and urgency but he wishes to hold off for now. Follow-up in office 6 months    Patient should call the office immediately with new or ongoing signs or symptoms or worsening, or proceed to the emergency room. No changes in past medical history, past surgical history, social history, or family history were noted during the patient encounter unless specifically listed above.   All updates of past medical history, past surgical history, social history, or family history were reviewed personally by me during the office visit. All problems listed in the assessment are stable unless noted otherwise. Medication profile reviewed personally by me during the visit. Medication side effects and possible impairments from medications were discussed as applicable. This document was prepared by a combination of typing and transcription through a voice recognition software. Scribe attestation: Darren Briggs MA, am scribing for and in the presence of Devi Isaacs MD. Electronically signed by Johnathon Steel MA on 10/19/2020 at 12:49 PM      Provider attestation:     I, Dr. Iliana Thornton, personally performed the services described in this documentation, as scribed by the above signed scribe in my presence, and it is both accurate and complete. I agree with the ROS and Past Histories independently gathered by the clinical support staff and the remaining scribed note accurately describes my personal service to the patient. 10/19/2020    1:45 PM    Low Dose CT (LDCT) Lung Screening criteria met   Age 55-77   Pack year smoking >30   Still smoking or less than 15 year since quit   No sign or symptoms of lung cancer   > 11 months since last LDCT     Risks and benefits of lung cancer screening with LDCT scans discussed:    Significance of positive screen - False-positive LDCT results often occur. 95% of all positive results do not lead to a diagnosis of cancer. Usually further imaging can resolve most false-positive results; however, some patients may require invasive procedures. Over diagnosis risk - 10% to 12% of screen-detected lung cancer cases are over diagnosed--that is, the cancer would not have been detected in the patient's lifetime without the screening.     Need for follow up screens annually to continue lung cancer screening effectiveness     Risks associated with radiation from annual LDCT- Radiation exposure is about the same as for a mammogram, which is about 1/3 of the annual background radiation exposure from everyday life. Starting screening at age 54 is not likely to increase cancer risk from radiation exposure. Patients with comorbidities resulting in life expectancy of < 10 years, or that would preclude treatment of an abnormality identified on CT, should not be screened due to lack of benefit.     To obtain maximal benefit from this screening, smoking cessation and long-term abstinence from smoking is critical

## 2020-10-22 ENCOUNTER — NURSE ONLY (OUTPATIENT)
Dept: FAMILY MEDICINE CLINIC | Age: 65
End: 2020-10-22
Payer: COMMERCIAL

## 2020-10-22 LAB
A/G RATIO: 1.8 (ref 1.1–2.2)
ALBUMIN SERPL-MCNC: 4.4 G/DL (ref 3.4–5)
ALP BLD-CCNC: 116 U/L (ref 40–129)
ALT SERPL-CCNC: 30 U/L (ref 10–40)
ANION GAP SERPL CALCULATED.3IONS-SCNC: 9 MMOL/L (ref 3–16)
AST SERPL-CCNC: 26 U/L (ref 15–37)
BASOPHILS ABSOLUTE: 0 K/UL (ref 0–0.2)
BASOPHILS RELATIVE PERCENT: 0.6 %
BILIRUB SERPL-MCNC: <0.2 MG/DL (ref 0–1)
BUN BLDV-MCNC: 16 MG/DL (ref 7–20)
CALCIUM SERPL-MCNC: 9.5 MG/DL (ref 8.3–10.6)
CHLORIDE BLD-SCNC: 100 MMOL/L (ref 99–110)
CHOLESTEROL, TOTAL: 123 MG/DL (ref 0–199)
CO2: 29 MMOL/L (ref 21–32)
CREAT SERPL-MCNC: 1.1 MG/DL (ref 0.8–1.3)
EOSINOPHILS ABSOLUTE: 0.1 K/UL (ref 0–0.6)
EOSINOPHILS RELATIVE PERCENT: 2.5 %
GFR AFRICAN AMERICAN: >60
GFR NON-AFRICAN AMERICAN: >60
GLOBULIN: 2.4 G/DL
GLUCOSE BLD-MCNC: 115 MG/DL (ref 70–99)
HCT VFR BLD CALC: 37.9 % (ref 40.5–52.5)
HDLC SERPL-MCNC: 30 MG/DL (ref 40–60)
HEMOGLOBIN: 12.8 G/DL (ref 13.5–17.5)
LDL CHOLESTEROL CALCULATED: 74 MG/DL
LYMPHOCYTES ABSOLUTE: 1.4 K/UL (ref 1–5.1)
LYMPHOCYTES RELATIVE PERCENT: 24.8 %
MCH RBC QN AUTO: 32 PG (ref 26–34)
MCHC RBC AUTO-ENTMCNC: 33.7 G/DL (ref 31–36)
MCV RBC AUTO: 95 FL (ref 80–100)
MONOCYTES ABSOLUTE: 0.4 K/UL (ref 0–1.3)
MONOCYTES RELATIVE PERCENT: 7.5 %
NEUTROPHILS ABSOLUTE: 3.8 K/UL (ref 1.7–7.7)
NEUTROPHILS RELATIVE PERCENT: 64.6 %
PDW BLD-RTO: 13.6 % (ref 12.4–15.4)
PLATELET # BLD: 242 K/UL (ref 135–450)
PMV BLD AUTO: 9 FL (ref 5–10.5)
POTASSIUM SERPL-SCNC: 4.8 MMOL/L (ref 3.5–5.1)
RBC # BLD: 3.99 M/UL (ref 4.2–5.9)
SODIUM BLD-SCNC: 138 MMOL/L (ref 136–145)
TOTAL CK: 261 U/L (ref 39–308)
TOTAL PROTEIN: 6.8 G/DL (ref 6.4–8.2)
TRIGL SERPL-MCNC: 93 MG/DL (ref 0–150)
VITAMIN D 25-HYDROXY: 52.7 NG/ML
VLDLC SERPL CALC-MCNC: 19 MG/DL
WBC # BLD: 5.8 K/UL (ref 4–11)

## 2020-10-22 PROCEDURE — 36415 COLL VENOUS BLD VENIPUNCTURE: CPT | Performed by: FAMILY MEDICINE

## 2020-10-23 RX ORDER — ENALAPRIL MALEATE 20 MG/1
TABLET ORAL
Qty: 60 TABLET | Refills: 5 | Status: SHIPPED | OUTPATIENT
Start: 2020-10-23 | End: 2021-04-22

## 2020-11-06 RX ORDER — SIMVASTATIN 20 MG
TABLET ORAL
Qty: 30 TABLET | Refills: 5 | Status: SHIPPED | OUTPATIENT
Start: 2020-11-06 | End: 2021-06-01

## 2020-11-18 RX ORDER — NIACIN 1000 MG/1
TABLET, EXTENDED RELEASE ORAL
Qty: 90 TABLET | Refills: 3 | Status: SHIPPED | OUTPATIENT
Start: 2020-11-18 | End: 2021-11-16

## 2020-11-18 RX ORDER — OMEPRAZOLE 40 MG/1
40 CAPSULE, DELAYED RELEASE ORAL
Qty: 90 CAPSULE | Refills: 3 | Status: SHIPPED | OUTPATIENT
Start: 2020-11-18 | End: 2021-11-16

## 2020-12-04 ENCOUNTER — VIRTUAL VISIT (OUTPATIENT)
Dept: PULMONOLOGY | Age: 65
End: 2020-12-04
Payer: MEDICARE

## 2020-12-04 ENCOUNTER — TELEPHONE (OUTPATIENT)
Dept: PULMONOLOGY | Age: 65
End: 2020-12-04

## 2020-12-04 PROCEDURE — 99443 PR PHYS/QHP TELEPHONE EVALUATION 21-30 MIN: CPT | Performed by: NURSE PRACTITIONER

## 2020-12-04 ASSESSMENT — SLEEP AND FATIGUE QUESTIONNAIRES
HOW LIKELY ARE YOU TO NOD OFF OR FALL ASLEEP WHILE LYING DOWN TO REST IN THE AFTERNOON WHEN CIRCUMSTANCES PERMIT: 1
HOW LIKELY ARE YOU TO NOD OFF OR FALL ASLEEP WHILE SITTING QUIETLY AFTER LUNCH WITHOUT ALCOHOL: 1
HOW LIKELY ARE YOU TO NOD OFF OR FALL ASLEEP WHILE SITTING INACTIVE IN A PUBLIC PLACE: 0
ESS TOTAL SCORE: 5
HOW LIKELY ARE YOU TO NOD OFF OR FALL ASLEEP WHILE SITTING AND TALKING TO SOMEONE: 0
HOW LIKELY ARE YOU TO NOD OFF OR FALL ASLEEP WHEN YOU ARE A PASSENGER IN A CAR FOR AN HOUR WITHOUT A BREAK: 1
HOW LIKELY ARE YOU TO NOD OFF OR FALL ASLEEP WHILE WATCHING TV: 1
HOW LIKELY ARE YOU TO NOD OFF OR FALL ASLEEP IN A CAR, WHILE STOPPED FOR A FEW MINUTES IN TRAFFIC: 0
HOW LIKELY ARE YOU TO NOD OFF OR FALL ASLEEP WHILE SITTING AND READING: 1

## 2020-12-04 NOTE — TELEPHONE ENCOUNTER
We need a report from patients cpap machine. Patient said that he will go today to obtain report from [employer]. Please watch for report.

## 2020-12-04 NOTE — TELEPHONE ENCOUNTER
Within this Telehealth Consent, the terms you and yours refer to the person using the Telehealth Service (Service), or in the case of a use of the Service by or on behalf of a minor, you and yours refer to and include (i) the parent or legal guardian who provides consent to the use of the Service by such minor or uses the Service on behalf of such minor, and (ii) the minor for whom consent is being provided or on whose behalf the Service is being utilized. When using Service, you will be consulting with your health care providers via the use of Telehealth.   Telehealth involves the delivery of healthcare services using electronic communications, information technology or other means between a healthcare provider and a patient who are not in the same physical location. Telehealth may be used for diagnosis, treatment, follow-up and/or patient education, and may include, but is not limited to, one or more of the following:    Electronic transmission of medical records, photo images, personal health information or other data between a patient and a healthcare provider    Interactions between a patient and healthcare provider via audio, video and/or data communications    Use of output data from medical devices, sound and video files    Anticipated Benefits   The use of Telehealth by your Provider(s) through the Service may have the following possible benefits:    Making it easier and more efficient for you to access medical care and treatment for the conditions treated by such Provider(s) utilizing the Service    Allowing you to obtain medical care and treatment by Provider(s) at times that are convenient for you    Enabling you to interact with Provider(s) without the necessity of an in-office appointment     Possible Risks   While the use of Telehealth can provide potential benefits for you, there are also potential risks associated with the use of Telehealth.  These risks include, but may not be limited to the following:    Your Provider(s) may not able to provide medical treatment for your particular condition and you may be required to seek alternative healthcare or emergency care services.  The electronic systems or other security protocols or safeguards used in the Service could fail, causing a breach of privacy of your medical or other information.  Given regulatory requirements in certain jurisdictions, your Provider(s) diagnosis and/or treatment options, especially pertaining to certain prescriptions, may be limited. Acceptance   1. You understand that Services will be provided via Telehealth. This process involves the use of HIPAA compliant and secure, real-time audio-visual interfacing with a qualified and appropriately trained provider located at [facility]. 2. You understand that, under no circumstances, will this session be recorded. 3. You understand that the Provider(s) at [facility] and other clinical participants will be party to the information obtained during the Telehealth session in accordance with best medical practices. 4. You understand that the information obtained during the Telehealth session will be used to help determine the most appropriate treatment options. 5. You understand that You have the right to revoke this consent at any point in time. 6. You understand that Telehealth is voluntary, and that continued treatment is not dependent upon consent. 7. You understand that, in the event of non-consent to Telehealth services and/or technical difficulties, you will obtain services as typically provided in the absence of Telehealth technology. 8. You understand that this consent will be kept in Your medical record. 9. No potential benefits from the use of Telehealth or specific results can be guaranteed. Your condition may not be cured or improved and, in some cases, may get worse.    10. There are limitations in the provision of medical care and treatment via Telehealth and the Service and you may not be able to receive diagnosis and/or treatment through the Service for every condition for which you seek diagnosis and/or treatment. 11. There are potential risks to the use of Telehealth, including but not limited to the risks described in this Telehealth Consent. 12. Your Provider(s) have discussed the use of Telehealth and the Service with you, including the benefits and risks of such and you have provided oral consent to your Provider(s) for the use of Telehealth and the Service. 15. You understand that it is your duty to provide your Provider(s) truthful, accurate and complete information, including all relevant information regarding care that you may have received or may be receiving from other healthcare providers outside of the Service. 14. You understand that each of your Provider(s) may determine in his or sole discretion that your condition is not suitable for diagnosis and/or treatment using the Service, and that you may need to seek medical care and treatment a specialist or other healthcare provider, outside of the Service. 15. You understand that you are fully responsible for payment for all services provided by Provider(s) or through use of the Service and that you may not be able to use third-party insurance. 16. You represent that (a) you have read this Telehealth Consent carefully, (b) you understand the risks and benefits of the Service and the use of Telehealth in the medical care and treatment provided to you by Provider(s) using the Service, and (c) you have the legal capacity and authority to provide this consent for yourself and/or the minor for which you are consenting under applicable federal and state laws, including laws relating to the age of [de-identified] and/or parental/guardian consent.    17. You give your informed consent to the use of Telehealth by Provider(s) using the Service under the terms described in the Terms of Service and this Telehealth Consent. The patient was read the following statement and has consented to the visit as of 12/4/20. The patient has been scheduled for their first telehealth visit on 12/4/20 with [facility].

## 2020-12-04 NOTE — PROGRESS NOTES
disease)     Hyperglycemia     Hyperlipidemia     Hypertension     Kidney stone 09/2016    AJ treated with BiPAP     Osteoarthritis     Trigger thumb     Vitamin D deficiency        Past Surgical History:        Procedure Laterality Date    APPENDECTOMY      BACK SURGERY      CARPAL TUNNEL RELEASE  11/12    right    COLONOSCOPY  08/14/2018    diverticulosis    CYSTOSCOPY Right 09/28/2016     RIGHT URETEROSCOPY , RIGHT STENT PLACEMENT    HEMORRHOID SURGERY      JOINT REPLACEMENT      right shoulder replacement    OTHER SURGICAL HISTORY  10/12/2016    CYSTOSCOPY, DIAGNOSTIC RIGHT URETEROSCOPY, RIGHT RETROGRADE    [place] COLONOSCOPY FLX DX W/COLLJ SPEC WHEN PFRMD N/A 8/15/2018    COLONOSCOPY performed by Ming Barrientos MD at [address] Left 07/02/13    left total shoulder replacement       Allergies:  is allergic to suprax [cefixime]. Social History:    TOBACCO:   reports that he has been smoking cigarettes. He started smoking about 31 years ago. He has a 30.00 pack-year smoking history. He has never used smokeless tobacco.  ETOH:   reports current alcohol use.     Family History:       Problem Relation Age of Onset    Heart Disease Mother     High Blood Pressure Mother     Arthritis Mother     Heart Disease Father     Arthritis Father        Current Medications:    Current Outpatient Medications:     omeprazole (PRILOSEC) 40 MG delayed release capsule, Take 1 capsule by mouth every morning (before breakfast), Disp: 90 capsule, Rfl: 3    niacin (NIASPAN) 1000 MG extended release tablet, TAKE ONE TABLET BY MOUTH NIGHTLY, Disp: 90 tablet, Rfl: 3    simvastatin (ZOCOR) 20 MG tablet, TAKE 1 TABLET AT BEDTIME, Disp: 30 tablet, Rfl: 5    enalapril (VASOTEC) 20 MG tablet, TAKE 1 TABLET BY MOUTH 2 TIMES DAILY, Disp: 60 tablet, Rfl: 5    zoster recombinant adjuvanted vaccine (SHINGRIX) 50 MCG/0.5ML SUSR injection, 1 dose now and repeat in 2-6 months, Disp: 1 each, Rfl: 1    diclofenac (VOLTAREN) 75 MG EC tablet, Take 1 tablet by mouth 2 times daily, Disp: 60 tablet, Rfl: 11    Vitamin D (CHOLECALCIFEROL) 25 MCG (1000 UT) TABS tablet, TAKE 2 TABLETS BY MOUTH DAILY, Disp: 100 tablet, Rfl: 0    Omega-3 Fatty Acids (FISH OIL) 1000 MG CAPS, Take 1 capsule by mouth 2 times daily, Disp: 360 capsule, Rfl: 1    Ferrous Sulfate (IRON) 325 (65 Fe) MG TABS, TAKE ONE TABLET BY MOUTH DAILY, Disp: 100 tablet, Rfl: 0    Biotin 1000 MCG TABS, Take 1 tablet by mouth daily , Disp: , Rfl:     allopurinol (ZYLOPRIM) 100 MG tablet, Take 200 mg by mouth daily , Disp: , Rfl:     Misc Natural Products (OSTEO BI-FLEX TRIPLE STRENGTH PO), Take 2 capsules by mouth daily. , Disp: , Rfl:       Objective:   PHYSICAL EXAM:    There were no vitals taken for this visit. Physical exam:      DATA:   2/17/2014 HST AHI 61, low SPO2 50%  3/14/2014 Pap titration controlled sleep-related breathing with BiPAP, PLMS 75, recommendations BiPAP 17/13 cm H2O    BIPAP compliance data:  Compliance download report from 11/1/17 to 11/30/17 reviewed today by me and showed patient is using machine 7:22 hrs/night with 100% compliance and AHI 4.1 within this time frame. 30/30days with greater than 4 hours of machine use. BIPAP 17/13 cm H20    Compliance download report from 10/31/18 to 11/29/18 reviewed today by me and showed patient is using machine 6:52 hrs/night with 100% compliance and AHI 5.7 within this time frame. 30/30days with greater than 4 hours of machine use. BIPAP 17/13 cm H20    BiPAP compliance report from 12/1/18-12/30/18 on BiPAP 18/14 cm H2O reviewed. Compliance is good 100%. AHI is improving still slightly elevated 5.2. BiPAP compliance report from 1/8/19-2/6/19 on BiPAP 19/15 cm H2O reviewed.   Compliance is good 100% AHI is good 3.9    Compliance download report from 11/2/19 to 12/1/19 reviewed today by me and showed patient is using machine 7:26 hrs/night with 100% compliance and AHI 4.1 within this time frame. 30/30days with greater than 4 hours of machine use. BIPAP 19/15 cm H20    12/4/2020-unable to obtain BiPAP download report. Patient has older BiPAP with no modem    Assessment:      · Severe AJ. BIPAP 19/15 cm H2O. compliant per patient  · Fatigue  · Obesity  · Sleep maintenance insomnia-psychophysiological hyperarousal, muscle pain likely contributing    Plan:       - Continue BIPAP 19/15cm H2O.  -Request BiPAP download from DME  -Can try [employer] mask liners to help with fit or can try different mask. Patient to call DME if he would like to try different mask. - Advised to use BiPAP 6-8 hrs at night and during naps. - Replacement of mask, tubing, head straps every 3-6 months or sooner if damaged. - Patient instructed to contact [employer] for any mask, tubing or machine trouble shooting if problems arise.  - Sleep hygiene  Cognitive behavioral therapy was discussed with patient including stimulus control and sleep restriction   -Continue to work with PCP for muscle/joint pain as this is likely contributing to poor sleep recently  - Avoid sedatives, alcohol and caffeinated drinks at bed time. - Patient counseled to never drive or operate heavy machinery while fatigue, drowsy or sleepy- patient verbalized understanding and agrees. - Weight loss is recommended as a long-term intervention.    - Complications of AJ if not treated were discussed with patient patient, including: systemic hypertension, pulmonary hypertension, cardiovascular morbidities, car accidents and all cause mortality.  -Patient education provided regarding sleep tips and PAP cleaning recommendations     Follow up: 1 year (pt preference), sooner if needed    Consent for telehealth visit was obtained and is noted in chart    Nazanin Ignacio is a 71 y.o. male evaluated via telephone on 12/4/2020.         I affirm this is a Patient Initiated Episode with a Patient who has not had a related appointment within my department in the past 7 days or scheduled within the next 24 hours.     Patient identification was verified at the start of the visit: Yes    Total Time: minutes: 21-30 minutes    Note: not billable if this call serves to triage the patient into an appointment for the relevant concern      Kelby Esqueda

## 2020-12-04 NOTE — TELEPHONE ENCOUNTER
BiPAP compliance report from 11/4/2020-12/3/2020 on BiPAP 19/15 cm H2O reviewed. Compliance is great 100%. AHI is good 3.2.   Please make patient

## 2020-12-04 NOTE — PATIENT INSTRUCTIONS

## 2020-12-11 ENCOUNTER — TELEPHONE (OUTPATIENT)
Dept: FAMILY MEDICINE CLINIC | Age: 65
End: 2020-12-11

## 2020-12-11 NOTE — TELEPHONE ENCOUNTER
Okay to order, but I am not sure her insurance will approve it since he has not been seen in our office

## 2020-12-11 NOTE — TELEPHONE ENCOUNTER
Pt states that he went to the chiropractor Dr. Santillan Sunman and he was wanting to have pt get an MRI of his back states that he thinks that he has a pinched nerve.

## 2020-12-17 ENCOUNTER — HOSPITAL ENCOUNTER (OUTPATIENT)
Dept: MRI IMAGING | Age: 65
Discharge: HOME | End: 2020-12-17
Payer: MEDICARE

## 2020-12-17 PROCEDURE — A9579 GAD-BASE MR CONTRAST NOS,1ML: HCPCS | Performed by: FAMILY MEDICINE

## 2020-12-17 PROCEDURE — 72158 MRI LUMBAR SPINE W/O & W/DYE: CPT

## 2020-12-17 PROCEDURE — 6360000004 HC RX CONTRAST MEDICATION: Performed by: FAMILY MEDICINE

## 2020-12-17 RX ORDER — PNV NO.95/FERROUS FUM/FOLIC AC 28MG-0.8MG
TABLET ORAL
Qty: 100 TABLET | Refills: 0 | Status: SHIPPED | OUTPATIENT
Start: 2020-12-17 | End: 2021-06-29

## 2020-12-17 RX ADMIN — GADOTERIDOL 20 ML: 279.3 INJECTION, SOLUTION INTRAVENOUS at 09:17

## 2021-01-07 ENCOUNTER — HOSPITAL ENCOUNTER (OUTPATIENT)
Dept: CT IMAGING | Age: 66
Discharge: HOME | End: 2021-01-07
Payer: MEDICARE

## 2021-01-07 DIAGNOSIS — Z87.891 PERSONAL HISTORY OF TOBACCO USE: ICD-10-CM

## 2021-01-07 PROCEDURE — 71271 CT THORAX LUNG CANCER SCR C-: CPT

## 2021-02-19 ENCOUNTER — HOSPITAL ENCOUNTER (OUTPATIENT)
Age: 66
Discharge: HOME | End: 2021-02-19
Payer: MEDICARE

## 2021-02-19 PROCEDURE — 36415 COLL VENOUS BLD VENIPUNCTURE: CPT

## 2021-02-19 PROCEDURE — 84153 ASSAY OF PSA TOTAL: CPT

## 2021-02-20 LAB — PROSTATE SPECIFIC ANTIGEN: <0.01 NG/ML (ref 0–4)

## 2021-04-19 ENCOUNTER — OFFICE VISIT (OUTPATIENT)
Dept: FAMILY MEDICINE CLINIC | Age: 66
End: 2021-04-19
Payer: MEDICARE

## 2021-04-19 VITALS
DIASTOLIC BLOOD PRESSURE: 74 MMHG | HEIGHT: 70 IN | BODY MASS INDEX: 35.22 KG/M2 | WEIGHT: 246 LBS | HEART RATE: 95 BPM | SYSTOLIC BLOOD PRESSURE: 124 MMHG | OXYGEN SATURATION: 98 %

## 2021-04-19 DIAGNOSIS — E78.2 MIXED HYPERLIPIDEMIA: ICD-10-CM

## 2021-04-19 DIAGNOSIS — R73.9 HYPERGLYCEMIA: ICD-10-CM

## 2021-04-19 DIAGNOSIS — Z90.79 STATUS POST PROSTATECTOMY: ICD-10-CM

## 2021-04-19 DIAGNOSIS — M15.9 PRIMARY OSTEOARTHRITIS INVOLVING MULTIPLE JOINTS: ICD-10-CM

## 2021-04-19 DIAGNOSIS — K42.9 UMBILICAL HERNIA WITHOUT OBSTRUCTION AND WITHOUT GANGRENE: ICD-10-CM

## 2021-04-19 DIAGNOSIS — C61 MALIGNANT NEOPLASM OF PROSTATE (HCC): ICD-10-CM

## 2021-04-19 DIAGNOSIS — R39.9 LOWER URINARY TRACT SYMPTOMS: ICD-10-CM

## 2021-04-19 DIAGNOSIS — N20.0 KIDNEY STONES: ICD-10-CM

## 2021-04-19 DIAGNOSIS — I10 ESSENTIAL HYPERTENSION, BENIGN: Primary | ICD-10-CM

## 2021-04-19 DIAGNOSIS — M1A.0790 IDIOPATHIC CHRONIC GOUT OF FOOT WITHOUT TOPHUS, UNSPECIFIED LATERALITY: ICD-10-CM

## 2021-04-19 DIAGNOSIS — G47.33 SEVERE OBSTRUCTIVE SLEEP APNEA: ICD-10-CM

## 2021-04-19 DIAGNOSIS — K21.9 GASTROESOPHAGEAL REFLUX DISEASE WITHOUT ESOPHAGITIS: ICD-10-CM

## 2021-04-19 DIAGNOSIS — Z72.0 TOBACCO ABUSE: ICD-10-CM

## 2021-04-19 DIAGNOSIS — E55.9 VITAMIN D DEFICIENCY: ICD-10-CM

## 2021-04-19 DIAGNOSIS — Z85.46 HISTORY OF PROSTATE CANCER: ICD-10-CM

## 2021-04-19 PROCEDURE — 1123F ACP DISCUSS/DSCN MKR DOCD: CPT | Performed by: FAMILY MEDICINE

## 2021-04-19 PROCEDURE — 99214 OFFICE O/P EST MOD 30 MIN: CPT | Performed by: FAMILY MEDICINE

## 2021-04-19 PROCEDURE — 4040F PNEUMOC VAC/ADMIN/RCVD: CPT | Performed by: FAMILY MEDICINE

## 2021-04-19 PROCEDURE — G8427 DOCREV CUR MEDS BY ELIG CLIN: HCPCS | Performed by: FAMILY MEDICINE

## 2021-04-19 PROCEDURE — 4004F PT TOBACCO SCREEN RCVD TLK: CPT | Performed by: FAMILY MEDICINE

## 2021-04-19 PROCEDURE — G8417 CALC BMI ABV UP PARAM F/U: HCPCS | Performed by: FAMILY MEDICINE

## 2021-04-19 PROCEDURE — 3017F COLORECTAL CA SCREEN DOC REV: CPT | Performed by: FAMILY MEDICINE

## 2021-04-19 ASSESSMENT — PATIENT HEALTH QUESTIONNAIRE - PHQ9
SUM OF ALL RESPONSES TO PHQ QUESTIONS 1-9: 0
SUM OF ALL RESPONSES TO PHQ QUESTIONS 1-9: 0

## 2021-04-19 NOTE — PROGRESS NOTES
Chief Complaint   Patient presents with    Hyperlipidemia    Hypertension    Gout    Sleep Apnea    Other     patient has multiple medical complaints       Wt Readings from Last 3 Encounters:   21 246 lb (111.6 kg)   21 240 lb (108.9 kg)   21 240 lb (108.9 kg)     BP Readings from Last 3 Encounters:   21 124/74   21 (!) 144/75   21 122/81      Lab Results   Component Value Date    LABA1C 5.6 2018    LABA1C 5.9 2018     He still uses the CPAP at night. The last few nights have been good but he does not always sleep good. The increase in the Diclofenac has been helping with his arthritis pain. He just had an injection for his back pain a couple of weeks ago which helped up until this past weekend. He has a follow up on Thursday with Dr Milad Baker. He has not seen anybody for the umbilical hernia yet and is not ready to have it fixed yet. He saw his urologist for urinary frequency in March and they do not recommend him having anything done about it right now. He has been wearing disposable briefs and pads. No recent gout attacks. He has been thinking about trying to quit smoking. HPI:  Paulo Stark is a 76 y.o. (: 1955) here today for    Treatment Adherence:   Medication compliance:  compliant all of the time  Diet compliance:  eats what he wants  Weight trend: stable  Current exercise: no regular exercise  Barriers: none    Hypertension:  Home blood pressure monitoring: No. Patient denies chest pain and shortness of breath. Antihypertensive medication side effects: no medication side effects noted. Use of agents associated with hypertension: none.                                         Sodium (mmol/L)   Date Value   10/22/2020 138    BUN (mg/dL)   Date Value   10/22/2020 16    Glucose (mg/dL)   Date Value   10/22/2020 115 (H)      Potassium (mmol/L)   Date Value   10/22/2020 4.8     Potassium reflex Magnesium (mmol/L)   Date Value   2020 4.9 CREATININE (mg/dL)   Date Value   10/22/2020 1.1         Hyperlipidemia:  No new myalgias or GI upset on simvastatin (Zocor). Lab Results   Component Value Date    CHOL 123 10/22/2020    TRIG 93 10/22/2020    HDL 30 (L) 10/22/2020    LDLCALC 74 10/22/2020     Lab Results   Component Value Date    ALT 30 10/22/2020    AST 26 10/22/2020          [] Patient has completed an advance directive  [] Patient has NOT completed an advanced directive  [] Patient has a documented healthcare surrogate  [] Patient does NOT have a documented healthcare surrogate  [] Discussed the importance of establishing and updating an advanced directive. Patient has questions at this time and those were answered. [] Discussed the importance of establishing and updating an advanced directive. Patient does NOT have questions at this time. Discussed with: [] Patient            [] Family             [] Other caregiver    Patient's medications, allergies, past medical, surgical, social and family histories were reviewed and updated asappropriate on 4/19/2021 at 1:28 PM.    ROS:  Review of Systems    All other systems reviewed and are negative except as noted above on 4/19/2021 at 1:28 PM. Additional review of systems may be scanned into the media section ofthis medical record. Any responses requiring further intervention were pursued.     Past Medical History:   Diagnosis Date    Arthritis     CTS (carpal tunnel syndrome)     Erectile dysfunction     GERD (gastroesophageal reflux disease)     Hyperglycemia     Hyperlipidemia     Hypertension     Kidney stone 09/2016    Malignant neoplasm of prostate ([facility] [address] 4/19/2021    AJ treated with BiPAP     Osteoarthritis     Trigger thumb     Vitamin D deficiency      Family History   Problem Relation Age of Onset    Heart Disease Mother     High Blood Pressure Mother     Arthritis Mother     Heart Disease Father     Arthritis Father      Social History     Socioeconomic History  Marital status:      Spouse name: Not on file    Number of children: Not on file    Years of education: Not on file    Highest education level: Not on file   Occupational History    Not on file   Social Needs    Financial resource strain: Not on file    Food insecurity     Worry: Not on file     Inability: Not on file    Transportation needs     Medical: Not on file     Non-medical: Not on file   Tobacco Use    Smoking status: Current Every Day Smoker     Packs/day: 1.00     Years: 30.00     Pack years: 30.00     Types: Cigarettes     Start date: 1/1/1989    Smokeless tobacco: Never Used   Substance and Sexual Activity    Alcohol use: Yes     Alcohol/week: 0.0 standard drinks     Comment: occasionally -2 x month    Drug use: No    Sexual activity: Yes     Partners: Female   Lifestyle    Physical activity     Days per week: Not on file     Minutes per session: Not on file    Stress: Not on file   Relationships    Social connections     Talks on phone: Not on file     Gets together: Not on file     Attends Sikh service: Not on file     Active member of club or organization: Not on file     Attends meetings of clubs or organizations: Not on file     Relationship status: Not on file    Intimate partner violence     Fear of current or ex partner: Not on file     Emotionally abused: Not on file     Physically abused: Not on file     Forced sexual activity: Not on file   Other Topics Concern    Not on file   Social History Narrative    Not on file     Prior to Visit Medications    Medication Sig Taking?  Authorizing Provider   Ferrous Sulfate (IRON) 325 (65 Fe) MG TABS TAKE ONE TABLET BY MOUTH DAILY Yes Niko Montiel MD   omeprazole (PRILOSEC) 40 MG delayed release capsule Take 1 capsule by mouth every morning (before breakfast) Yes Niko Montiel MD   niacin (NIASPAN) 1000 MG extended release tablet TAKE ONE TABLET BY MOUTH NIGHTLY Yes Niko Montiel MD simvastatin (ZOCOR) 20 MG tablet TAKE 1 TABLET AT BEDTIME Yes Neda Cleaning MD   enalapril (VASOTEC) 20 MG tablet TAKE 1 TABLET BY MOUTH 2 TIMES DAILY Yes Neda Cleaning MD   diclofenac (VOLTAREN) 75 MG EC tablet Take 1 tablet by mouth 2 times daily Yes Neda Cleaning MD   Vitamin D (CHOLECALCIFEROL) 25 MCG (1000 UT) TABS tablet TAKE 2 TABLETS BY MOUTH DAILY Yes Neda Cleaning MD   Omega-3 Fatty Acids (FISH OIL) 1000 MG CAPS Take 1 capsule by mouth 2 times daily Yes KARLOS Hernandez CNP   Biotin 1000 MCG TABS Take 1 tablet by mouth daily  Yes Historical Provider, MD   allopurinol (ZYLOPRIM) 100 MG tablet Take 200 mg by mouth daily  Yes Historical Provider, MD   Misc Natural Products (OSTEO BI-FLEX TRIPLE STRENGTH PO) Take 2 capsules by mouth daily. Yes Historical Provider, MD     Allergies   Allergen Reactions    Suprax [Cefixime] Itching     Swollen hands and itching       OBJECTIVE:  Estimated body mass index is 35.3 kg/m² as calculated from the following:    Height as of this encounter: 5' 10\" (1.778 m). Weight as of this encounter: 246 lb (111.6 kg). Vitals:    04/19/21 1259   BP: 124/74   Site: Left Upper Arm   Position: Sitting   Cuff Size: Medium Adult   Pulse: 95   SpO2: 98%   Weight: 246 lb (111.6 kg)   Height: 5' 10\" (1.778 m)       Physical Exam  Vitals signs and nursing note reviewed. Constitutional:       General: He is not in acute distress. Appearance: He is well-developed. He is not diaphoretic. HENT:      Head: Normocephalic and atraumatic. Right Ear: External ear normal.      Left Ear: External ear normal.      Nose: Nose normal.   Eyes:      General: Lids are normal. No scleral icterus. Right eye: No discharge. Left eye: No discharge. Pupils: Pupils are equal, round, and reactive to light. Neck:      Thyroid: No thyromegaly. Vascular: No JVD.    Cardiovascular:      Rate and Rhythm: Normal rate and regular rhythm. Heart sounds: Normal heart sounds. Pulmonary:      Effort: Pulmonary effort is normal. No respiratory distress. Breath sounds: Normal breath sounds. Abdominal:      Palpations: Abdomen is soft. There is no hepatomegaly or splenomegaly. Tenderness: There is no abdominal tenderness. Skin:     General: Skin is warm and dry. Coloration: Skin is not pale. Findings: No erythema or rash. Comments: Turgor normal   Psychiatric:         Behavior: Behavior normal.         Thought Content: Thought content normal.         Judgment: Judgment normal.              ASSESSMENT PLAN      Diagnosis Orders   1. Essential hypertension, benign     2. Malignant neoplasm of prostate ([address]     3. Gastroesophageal reflux disease without esophagitis     4. Idiopathic chronic gout of foot without tophus, unspecified laterality     5. Primary osteoarthritis involving multiple joints     6. Severe obstructive sleep apnea     7. Vitamin D deficiency     8. Hyperglycemia     9. Tobacco abuse     10. Mixed hyperlipidemia     11. Umbilical hernia without obstruction and without gangrene     12. Lower urinary tract symptoms     13. History of prostate cancer     14. Kidney stones     15. Status post prostatectomy     Blood pressure acceptable. He is status post prostatectomy. Has some chronic leakage urology does not want do anything further. They did not wish to start Flomax. Reflux controlled. No gout attacks. Diclofenac helped his arthritis. 80% the time when he wakes up in the night he can go back to sleep 20% not. Does admit to start thinking when he wakes up. Probably nothing else to offer. Tobacco abuse - Pt will not quit despite understanding of risks of continued tobacco use, including cancer, heart attacks, strokes or death. Vitamin D levels will be monitored as needed continue lipid monitoring as needed. No symptoms of elevated sugar.   Has decided not to do anything about the hernia yet because the lifting restrictions. Intermittent kidney stones no recent attacks. Follow-up 6 months    Patient should call the office immediately with new or ongoing signs or symptoms or worsening, or proceed to the emergency room. No changes in past medical history, past surgical history, social history, or family history were noted during the patient encounter unless specifically listed above. All updates of past medical history, past surgical history, social history, or family history were reviewed personally by me during the office visit. All problems listed in the assessment are stable unless noted otherwise. Medication profile reviewed personally by me during the visit. Medication side effects and possible impairments from medications were discussed as applicable. This document was prepared by a combination of typing and transcription through a voice recognition software. I, Dr. Barbara Mason, personally performed the services described in this documentation, as scribed by the above signed scribe in my presence, and it is both accurate and complete. I agree with the ROS and Past Histories independently gathered by the clinical support staff and the remaining scribed note accurately describes my personal service to the patient. 4/19/2021    1:53 PM              Scribe attestation: Velvet Simpson MA, am scribing for and in the presence of Shari Alvarado MD. Electronically signed by Ulices Rodriguez MA on 4/19/2021 at 1:28 PM      Provider attestation:     I, Dr. Barbara Mason, personally performed the services described in this documentation, as scribed by the above signed scribe in my presence, and it is both accurate and complete. I agree with the ROS and Past Histories independently gathered by the clinical support staff and the remaining scribed note accurately describes my personal service to the patient.       4/19/2021    1:53 PM

## 2021-04-19 NOTE — PATIENT INSTRUCTIONS
Patient should call the office immediately with new or ongoing signs or symptoms or worsening, or proceed to the emergency room. If you are on medications which could impair your senses, you are at risk of weakness, falls, dizziness, or drowsiness. You should be careful during activities which could place you at risk of harm, such as climbing, using stairs, operating machinery, or driving vehicles. If you feel you cannot safely do these activities, you should request others to help you, or avoid the activities altogether. If you are drowsy for any other reason, you should use the same precautions as listed above. Web Address for Advance Directive:    [email or web address]     Scheduling a Covid Vaccine:    Website: [email or web address] [place].gov. [place]    Call: [phone] ([phone]-OD)    The medication list included in this document is our record of what you are currently taking, including any changes that were made at today's visit. If you find any differences when compared to your medications at home, or have any questions that were not answered at your visit, please contact the office.

## 2021-04-26 ENCOUNTER — TELEPHONE (OUTPATIENT)
Dept: FAMILY MEDICINE CLINIC | Age: 66
End: 2021-04-26

## 2021-07-26 ENCOUNTER — OFFICE VISIT (OUTPATIENT)
Dept: FAMILY MEDICINE CLINIC | Age: 66
End: 2021-07-26
Payer: MEDICARE

## 2021-07-26 VITALS
BODY MASS INDEX: 34.79 KG/M2 | SYSTOLIC BLOOD PRESSURE: 126 MMHG | DIASTOLIC BLOOD PRESSURE: 62 MMHG | OXYGEN SATURATION: 95 % | WEIGHT: 243 LBS | HEART RATE: 73 BPM | HEIGHT: 70 IN

## 2021-07-26 DIAGNOSIS — M15.9 PRIMARY OSTEOARTHRITIS INVOLVING MULTIPLE JOINTS: ICD-10-CM

## 2021-07-26 DIAGNOSIS — Z01.818 PRE-OP EXAM: Primary | ICD-10-CM

## 2021-07-26 DIAGNOSIS — K43.2 INCISIONAL HERNIA, WITHOUT OBSTRUCTION OR GANGRENE: ICD-10-CM

## 2021-07-26 DIAGNOSIS — C61 MALIGNANT NEOPLASM OF PROSTATE (HCC): ICD-10-CM

## 2021-07-26 DIAGNOSIS — G47.33 SEVERE OBSTRUCTIVE SLEEP APNEA: ICD-10-CM

## 2021-07-26 DIAGNOSIS — I10 ESSENTIAL HYPERTENSION, BENIGN: ICD-10-CM

## 2021-07-26 DIAGNOSIS — K21.9 GASTROESOPHAGEAL REFLUX DISEASE WITHOUT ESOPHAGITIS: ICD-10-CM

## 2021-07-26 DIAGNOSIS — Z90.79 STATUS POST PROSTATECTOMY: ICD-10-CM

## 2021-07-26 PROCEDURE — 1123F ACP DISCUSS/DSCN MKR DOCD: CPT | Performed by: FAMILY MEDICINE

## 2021-07-26 PROCEDURE — 4040F PNEUMOC VAC/ADMIN/RCVD: CPT | Performed by: FAMILY MEDICINE

## 2021-07-26 PROCEDURE — 99215 OFFICE O/P EST HI 40 MIN: CPT | Performed by: FAMILY MEDICINE

## 2021-07-26 PROCEDURE — 4004F PT TOBACCO SCREEN RCVD TLK: CPT | Performed by: FAMILY MEDICINE

## 2021-07-26 PROCEDURE — 3017F COLORECTAL CA SCREEN DOC REV: CPT | Performed by: FAMILY MEDICINE

## 2021-07-26 PROCEDURE — 93000 ELECTROCARDIOGRAM COMPLETE: CPT | Performed by: FAMILY MEDICINE

## 2021-07-26 PROCEDURE — G8427 DOCREV CUR MEDS BY ELIG CLIN: HCPCS | Performed by: FAMILY MEDICINE

## 2021-07-26 PROCEDURE — 36415 COLL VENOUS BLD VENIPUNCTURE: CPT | Performed by: FAMILY MEDICINE

## 2021-07-26 PROCEDURE — G8417 CALC BMI ABV UP PARAM F/U: HCPCS | Performed by: FAMILY MEDICINE

## 2021-07-26 SDOH — ECONOMIC STABILITY: FOOD INSECURITY: WITHIN THE PAST 12 MONTHS, THE FOOD YOU BOUGHT JUST DIDN'T LAST AND YOU DIDN'T HAVE MONEY TO GET MORE.: NEVER TRUE

## 2021-07-26 SDOH — ECONOMIC STABILITY: FOOD INSECURITY: WITHIN THE PAST 12 MONTHS, YOU WORRIED THAT YOUR FOOD WOULD RUN OUT BEFORE YOU GOT MONEY TO BUY MORE.: NEVER TRUE

## 2021-07-26 ASSESSMENT — SOCIAL DETERMINANTS OF HEALTH (SDOH): HOW HARD IS IT FOR YOU TO PAY FOR THE VERY BASICS LIKE FOOD, HOUSING, MEDICAL CARE, AND HEATING?: NOT HARD AT ALL

## 2021-07-26 NOTE — PROGRESS NOTES
Anahi Jasso MD, [address] [facility], [address] [place] [address] Drive. [address]. [place], [address]  Phone: [phone]  Fax: [phone]  Preoperative Consultation      Chela Suazo  YOB: 1955    Date of Service:  7/26/2021     Internal Administration   First Dose COVID-19, Moderna, PF, 100mcg/0.5mL  02/20/2021   Second Dose COVID-19, Moderna, PF, 100mcg/0.5mL   03/20/2021       Last COVID Lab No results found for: SARS-COV-2, SARS-COV-2 RNA, SARS-COV-2, SARS-COV-2, SARS-COV-2 BY PCR, SARS-COV-2, SARS-COV-2, SARS-COV-2         Vitals:    07/26/21 1551   BP: 126/62   Site: Left Upper Arm   Position: Sitting   Cuff Size: Large Adult   Pulse: 73   SpO2: 95%   Weight: 243 lb (110.2 kg)   Height: 5' 10\" (1.778 m)      Wt Readings from Last 2 Encounters:   07/26/21 243 lb (110.2 kg)   04/19/21 246 lb (111.6 kg)     BP Readings from Last 3 Encounters:   07/26/21 126/62   04/19/21 124/74   04/06/21 (!) 144/75        Chief Complaint   Patient presents with    Pre-op Exam     7/28/21. Dr Sourav Freeman. [employer].  hernia     Allergies   Allergen Reactions    Suprax [Cefixime] Itching     Swollen hands and itching     Outpatient Medications Marked as Taking for the 7/26/21 encounter (Office Visit) with Mary Baca MD   Medication Sig Dispense Refill    Ferrous Sulfate (IRON) 325 (65 Fe) MG TABS TAKE ONE TABLET BY MOUTH DAILY 100 tablet 5    simvastatin (ZOCOR) 20 MG tablet TAKE 1 TABLET AT BEDTIME 30 tablet 5    enalapril (VASOTEC) 20 MG tablet TAKE 1 TABLET BY MOUTH 2 TIMES DAILY 60 tablet 5    omeprazole (PRILOSEC) 40 MG delayed release capsule Take 1 capsule by mouth every morning (before breakfast) 90 capsule 3    niacin (NIASPAN) 1000 MG extended release tablet TAKE ONE TABLET BY MOUTH NIGHTLY 90 tablet 3    diclofenac (VOLTAREN) 75 MG EC tablet Take 1 tablet by mouth 2 times daily 60 tablet 11    Vitamin D (CHOLECALCIFEROL) 25 MCG (1000 UT) TABS tablet TAKE 2 TABLETS BY MOUTH DAILY 100 tablet 0    Omega-3 Fatty Acids (FISH OIL) 1000 MG CAPS Take 1 capsule by mouth 2 times daily 360 capsule 1    Biotin 1000 MCG TABS Take 1 tablet by mouth daily       allopurinol (ZYLOPRIM) 100 MG tablet Take 200 mg by mouth daily       Misc Natural Products (OSTEO BI-FLEX TRIPLE STRENGTH PO) Take 2 capsules by mouth daily. This patient presents to the office today for a preoperative consultation at the request of surgeon, Dr. Jud Muniz, who plans on performing a Hernia repair on July 28 at [facility]. The current problem began months ago and symptoms have been worsening with time. Conservative therapy: N/A. Planned anesthesia: General   Known anesthesia problems: None   Bleeding risk: No recent or remote history of abnormal bleeding  Personal or FH of DVT/PE: No    Patient objection to receiving blood products: No    Patient Active Problem List   Diagnosis    Vitamin D deficiency    ED (erectile dysfunction)    Essential hypertension, benign    Hyperglycemia    Hyperuricemia    left TSR    Severe obstructive sleep apnea    Gastroesophageal reflux disease without esophagitis    Dyspepsia and disorder of function of stomach    Tobacco abuse    Renal colic on right side    Idiopathic chronic gout of foot without tophus    Mixed hyperlipidemia    Primary osteoarthritis involving multiple joints    Diverticulosis of large intestine without hemorrhage    Obesity (BMI 30-39. 9)    Umbilical hernia without obstruction and without gangrene    Primary insomnia    Lower urinary tract symptoms    Malignant neoplasm of prostate ([address]    History of prostate cancer    Kidney stones    Status post prostatectomy       Past Medical History:   Diagnosis Date    Arthritis     CTS (carpal tunnel syndrome)     Erectile dysfunction     GERD (gastroesophageal reflux disease)     Hyperglycemia     Hyperlipidemia     Hypertension     Kidney stone 09/2016  Malignant neoplasm of prostate ([facility] 4/19/2021    AJ treated with BiPAP     Osteoarthritis     Trigger thumb     Vitamin D deficiency      Past Surgical History:   Procedure Laterality Date    APPENDECTOMY      BACK SURGERY      CARPAL TUNNEL RELEASE  11/12    right    COLONOSCOPY  08/14/2018    diverticulosis    CYSTOSCOPY Right 09/28/2016     RIGHT URETEROSCOPY , RIGHT STENT PLACEMENT    HEMORRHOID SURGERY      JOINT REPLACEMENT      right shoulder replacement    OTHER SURGICAL HISTORY  10/12/2016    CYSTOSCOPY, DIAGNOSTIC RIGHT URETEROSCOPY, RIGHT RETROGRADE    PA COLONOSCOPY FLX DX W/COLLJ SPEC WHEN PFRMD N/A 8/15/2018    COLONOSCOPY performed by Ricky Nunez MD at [address] Left 07/02/13    left total shoulder replacement     Family History   Problem Relation Age of Onset    Heart Disease Mother     High Blood Pressure Mother     Arthritis Mother     Heart Disease Father     Arthritis Father      Social History     Socioeconomic History    Marital status:      Spouse name: Not on file    Number of children: Not on file    Years of education: Not on file    Highest education level: Not on file   Occupational History    Not on file   Tobacco Use    Smoking status: Current Every Day Smoker     Packs/day: 1.00     Years: 30.00     Pack years: 30.00     Types: Cigarettes     Start date: 1/1/1989    Smokeless tobacco: Never Used   Substance and Sexual Activity    Alcohol use:  Yes     Alcohol/week: 0.0 standard drinks     Comment: occasionally -2 x month    Drug use: No    Sexual activity: Yes     Partners: Female   Other Topics Concern    Not on file   Social History Narrative    Not on file     Social Determinants of Health     Financial Resource Strain: Low Risk     Difficulty of Paying Living Expenses: Not hard at all   Food Insecurity: No Food Insecurity    Worried About [address] in the Last Year: Never true    Ran Out of Food in the Last Year: Never true   Transportation Needs:     Lack of Transportation (Medical):  Lack of Transportation (Non-Medical):    Physical Activity:     Days of Exercise per Week:     Minutes of Exercise per Session:    Stress:     Feeling of Stress :    Social Connections:     Frequency of Communication with Friends and Family:     Frequency of Social Gatherings with Friends and Family:     Attends Holiness Services:     Active Member of Clubs or Organizations:     Attends Club or Organization Meetings:     Marital Status:    Intimate Partner Violence:     Fear of Current or Ex-Partner:     Emotionally Abused:     Physically Abused:     Sexually Abused:        Review of Systems  A comprehensive review of systems was negative except for what was noted in the HPI. Physical Exam   Constitutional: He is oriented to person, place, and time. He appears well-developed and well-nourished. No distress. HENT:   Head: Normocephalic and atraumatic. Mouth/Throat: Uvula is midline, oropharynx is clear and moist and mucous membranes are normal.   Eyes: Conjunctivae and EOM are normal. Pupils are equal, round, and reactive to light. Neck: Trachea normal and normal range of motion. Neck supple. No JVD present. Carotid bruit is not present. No mass and no thyromegaly present. Cardiovascular: Normal rate, regular rhythm, normal heart sounds and intact distal pulses. Exam reveals no gallop and no friction rub. No murmur heard. Pulmonary/Chest: Effort normal and breath sounds normal. No respiratory distress. He has no wheezes. He has no rales. Abdominal: Soft. Normal aorta and bowel sounds are normal. He exhibits no distension. Incisional midline hernia present. There is no hepatosplenomegaly. No tenderness. Musculoskeletal: He exhibits no edema and no tenderness. Neurological: He is alert and oriented to person, place, and time. He has normal strength. to weeks prior to surgery and titrated to pulse < 70.  4. Deep vein thrombosis prophylaxis: regimen to be chosen by surgical team  5. No contraindications to planned surgery pending appropriate labs        Thank you for the referral,      LYRIC Crews M.D., [facility]    7/26/2021    4:26 PM    Scribe attestation: Gustavo Pelletier RN, am scribing for and in the presence of Hebert Rogers MD. Electronically signed by Radha Acosta RN on 7/26/2021 at 4:26 PM    (Copy of consult was returned to the requesting provider on the day of completion.)

## 2021-07-27 LAB
A/G RATIO: 1.8 (ref 1.1–2.2)
ALBUMIN SERPL-MCNC: 4.8 G/DL (ref 3.4–5)
ALP BLD-CCNC: 91 U/L (ref 40–129)
ALT SERPL-CCNC: 27 U/L (ref 10–40)
ANION GAP SERPL CALCULATED.3IONS-SCNC: 16 MMOL/L (ref 3–16)
AST SERPL-CCNC: 25 U/L (ref 15–37)
BASOPHILS ABSOLUTE: 0 K/UL (ref 0–0.2)
BASOPHILS RELATIVE PERCENT: 0.5 %
BILIRUB SERPL-MCNC: <0.2 MG/DL (ref 0–1)
BUN BLDV-MCNC: 18 MG/DL (ref 7–20)
CALCIUM SERPL-MCNC: 9.7 MG/DL (ref 8.3–10.6)
CHLORIDE BLD-SCNC: 101 MMOL/L (ref 99–110)
CO2: 25 MMOL/L (ref 21–32)
CREAT SERPL-MCNC: 1 MG/DL (ref 0.8–1.3)
EOSINOPHILS ABSOLUTE: 0.2 K/UL (ref 0–0.6)
EOSINOPHILS RELATIVE PERCENT: 3.9 %
GFR AFRICAN AMERICAN: >60
GFR NON-AFRICAN AMERICAN: >60
GLOBULIN: 2.6 G/DL
GLUCOSE BLD-MCNC: 95 MG/DL (ref 70–99)
HCT VFR BLD CALC: 35.6 % (ref 40.5–52.5)
HEMOGLOBIN: 12.4 G/DL (ref 13.5–17.5)
LYMPHOCYTES ABSOLUTE: 1.2 K/UL (ref 1–5.1)
LYMPHOCYTES RELATIVE PERCENT: 21 %
MCH RBC QN AUTO: 33.1 PG (ref 26–34)
MCHC RBC AUTO-ENTMCNC: 35 G/DL (ref 31–36)
MCV RBC AUTO: 94.6 FL (ref 80–100)
MONOCYTES ABSOLUTE: 0.6 K/UL (ref 0–1.3)
MONOCYTES RELATIVE PERCENT: 9.8 %
NEUTROPHILS ABSOLUTE: 3.8 K/UL (ref 1.7–7.7)
NEUTROPHILS RELATIVE PERCENT: 64.8 %
PDW BLD-RTO: 13.7 % (ref 12.4–15.4)
PLATELET # BLD: 187 K/UL (ref 135–450)
PMV BLD AUTO: 9.6 FL (ref 5–10.5)
POTASSIUM SERPL-SCNC: 4.6 MMOL/L (ref 3.5–5.1)
RBC # BLD: 3.76 M/UL (ref 4.2–5.9)
SODIUM BLD-SCNC: 142 MMOL/L (ref 136–145)
TOTAL PROTEIN: 7.4 G/DL (ref 6.4–8.2)
WBC # BLD: 5.9 K/UL (ref 4–11)

## 2021-09-01 ENCOUNTER — HOSPITAL ENCOUNTER (OUTPATIENT)
Age: 66
Discharge: HOME | End: 2021-09-01
Payer: MEDICARE

## 2021-09-01 PROCEDURE — 84153 ASSAY OF PSA TOTAL: CPT

## 2021-09-01 PROCEDURE — 36415 COLL VENOUS BLD VENIPUNCTURE: CPT

## 2021-09-02 LAB — PROSTATE SPECIFIC ANTIGEN: <0.01 NG/ML (ref 0–4)

## 2021-10-06 ENCOUNTER — TELEPHONE (OUTPATIENT)
Dept: PULMONOLOGY | Age: 66
End: 2021-10-06

## 2021-10-06 DIAGNOSIS — G47.33 SEVERE OBSTRUCTIVE SLEEP APNEA: Primary | ICD-10-CM

## 2021-10-06 NOTE — TELEPHONE ENCOUNTER
Patient called states his BiPAP was recalled did register machine. States his machine is 8 yrs old and would like orders faxed to [facility] for new BiPAP. Orders pended.

## 2021-10-07 NOTE — TELEPHONE ENCOUNTER
Patient returned call informed. Order faxed to [employer] [facility]. Is [facility] for 31-90d 12/11/21. Will call if machine not received. Told patient to call to check status with [employer] in 1 wk. To call office with any issues.

## 2021-10-08 NOTE — TELEPHONE ENCOUNTER
Norman Kaur with [facility] called stating they need orders for Bipap supplies. Per epic pt should be good for supplies until his next follow up.  Called patient to verify he states he does not need new supplies

## 2021-10-19 ENCOUNTER — OFFICE VISIT (OUTPATIENT)
Dept: FAMILY MEDICINE CLINIC | Age: 66
End: 2021-10-19
Payer: MEDICARE

## 2021-10-19 VITALS
BODY MASS INDEX: 35.3 KG/M2 | SYSTOLIC BLOOD PRESSURE: 131 MMHG | TEMPERATURE: 97.8 F | WEIGHT: 246 LBS | HEART RATE: 72 BPM | DIASTOLIC BLOOD PRESSURE: 75 MMHG | OXYGEN SATURATION: 96 %

## 2021-10-19 DIAGNOSIS — G47.33 SEVERE OBSTRUCTIVE SLEEP APNEA: ICD-10-CM

## 2021-10-19 DIAGNOSIS — E79.0 HYPERURICEMIA: ICD-10-CM

## 2021-10-19 DIAGNOSIS — E55.9 VITAMIN D DEFICIENCY: ICD-10-CM

## 2021-10-19 DIAGNOSIS — Z72.0 TOBACCO ABUSE: ICD-10-CM

## 2021-10-19 DIAGNOSIS — I10 ESSENTIAL HYPERTENSION, BENIGN: Primary | ICD-10-CM

## 2021-10-19 DIAGNOSIS — K21.9 GASTROESOPHAGEAL REFLUX DISEASE WITHOUT ESOPHAGITIS: ICD-10-CM

## 2021-10-19 DIAGNOSIS — E78.2 MIXED HYPERLIPIDEMIA: ICD-10-CM

## 2021-10-19 DIAGNOSIS — M15.9 PRIMARY OSTEOARTHRITIS INVOLVING MULTIPLE JOINTS: ICD-10-CM

## 2021-10-19 DIAGNOSIS — M1A.0790 IDIOPATHIC CHRONIC GOUT OF FOOT WITHOUT TOPHUS, UNSPECIFIED LATERALITY: ICD-10-CM

## 2021-10-19 DIAGNOSIS — R73.9 HYPERGLYCEMIA: ICD-10-CM

## 2021-10-19 LAB — VITAMIN D 25-HYDROXY: 40 NG/ML

## 2021-10-19 PROCEDURE — 1123F ACP DISCUSS/DSCN MKR DOCD: CPT | Performed by: FAMILY MEDICINE

## 2021-10-19 PROCEDURE — 4040F PNEUMOC VAC/ADMIN/RCVD: CPT | Performed by: FAMILY MEDICINE

## 2021-10-19 PROCEDURE — 4004F PT TOBACCO SCREEN RCVD TLK: CPT | Performed by: FAMILY MEDICINE

## 2021-10-19 PROCEDURE — 36415 COLL VENOUS BLD VENIPUNCTURE: CPT | Performed by: FAMILY MEDICINE

## 2021-10-19 PROCEDURE — G8427 DOCREV CUR MEDS BY ELIG CLIN: HCPCS | Performed by: FAMILY MEDICINE

## 2021-10-19 PROCEDURE — G0008 ADMIN INFLUENZA VIRUS VAC: HCPCS | Performed by: FAMILY MEDICINE

## 2021-10-19 PROCEDURE — 90674 CCIIV4 VAC NO PRSV 0.5 ML IM: CPT | Performed by: FAMILY MEDICINE

## 2021-10-19 PROCEDURE — G8417 CALC BMI ABV UP PARAM F/U: HCPCS | Performed by: FAMILY MEDICINE

## 2021-10-19 PROCEDURE — 99214 OFFICE O/P EST MOD 30 MIN: CPT | Performed by: FAMILY MEDICINE

## 2021-10-19 PROCEDURE — 3017F COLORECTAL CA SCREEN DOC REV: CPT | Performed by: FAMILY MEDICINE

## 2021-10-19 PROCEDURE — G8482 FLU IMMUNIZE ORDER/ADMIN: HCPCS | Performed by: FAMILY MEDICINE

## 2021-10-19 NOTE — PROGRESS NOTES
Chief Complaint   Patient presents with    Hypertension    Gout    Sleep Apnea        Internal Administration   First Dose COVID-19, Moderna, PF, 100mcg/0.5mL  2021   Second Dose COVID-19, Moderna, PF, 100mcg/0.5mL   2021       Last COVID Lab No results found for: SARS-COV-2, SARS-COV-2 RNA, SARS-COV-2, SARS-COV-2, SARS-COV-2 BY PCR, SARS-COV-2, SARS-COV-2, SARS-COV-2          Wt Readings from Last 3 Encounters:   10/19/21 246 lb (111.6 kg)   21 243 lb (110.2 kg)   21 246 lb (111.6 kg)     BP Readings from Last 3 Encounters:   10/19/21 131/75   21 126/62   21 124/74      Lab Results   Component Value Date    LABA1C 5.6 2018    LABA1C 5.9 2018       HPI:  Sara Walter is a 76 y.o. (: 1955) here today for    Patient states that his back pain is overall doing well and is very tolerable. He has not received another injection yet. He states that he continues to smoke and he has not quite decided if he wants to quit or not. Denies wanting to try and medication to help stop smoking. He states that his surgery went well for his hernia repair. Patient brought up that he has noticed his hands are a lot more stiff in the Am      [x] Patient has completed an advance directive  [] Patient has NOT completed an advanced directive  [x] Patient has a documented healthcare surrogate  [] Patient does NOT have a documented healthcare surrogate  [] Discussed the importance of establishing and updating an advanced directive. Patient has questions at this time and those were answered. [x] Discussed the importance of establishing and updating an advanced directive. Patient does NOT have questions at this time.     Discussed with: [x] Patient            [] Family             [] Other caregiver    Patient's medications, allergies, past medical, surgical, social and family histories were reviewed and updated asappropriate on 10/19/2021 at 7:36 AM.    ROS:  Review of Systems    All other systems reviewed and are negative except as noted above on 10/19/2021 at 7:36 AM. Additional review of systems may be scanned into the media section ofthis medical record. Any responses requiring further intervention were pursued. Past Medical History:   Diagnosis Date    Arthritis     CTS (carpal tunnel syndrome)     Erectile dysfunction     GERD (gastroesophageal reflux disease)     Hyperglycemia     Hyperlipidemia     Hypertension     Kidney stone 09/2016    Malignant neoplasm of prostate ([address] 4/19/2021    AJ treated with BiPAP     Osteoarthritis     Trigger thumb     Vitamin D deficiency      Family History   Problem Relation Age of Onset    Heart Disease Mother     High Blood Pressure Mother     Arthritis Mother     Heart Disease Father     Arthritis Father      Social History     Socioeconomic History    Marital status:      Spouse name: Not on file    Number of children: Not on file    Years of education: Not on file    Highest education level: Not on file   Occupational History    Not on file   Tobacco Use    Smoking status: Current Every Day Smoker     Packs/day: 1.00     Years: 30.00     Pack years: 30.00     Types: Cigarettes     Start date: 1/1/1989    Smokeless tobacco: Never Used   Substance and Sexual Activity    Alcohol use: Yes     Alcohol/week: 0.0 standard drinks     Comment: occasionally -2 x month    Drug use: No    Sexual activity: Yes     Partners: Female   Other Topics Concern    Not on file   Social History Narrative    Not on file     Social Determinants of Health     Financial Resource Strain: Low Risk     Difficulty of Paying Living Expenses: Not hard at all   Food Insecurity: No Food Insecurity    Worried About [address] in the Last Year: Never true    [address] in the Last Year: Never true   Transportation Needs:     Lack of Transportation (Medical):      Lack of Transportation (Non-Medical):    Physical Activity:     Days of Exercise per Week:     Minutes of Exercise per Session:    Stress:     Feeling of Stress :    Social Connections:     Frequency of Communication with Friends and Family:     Frequency of Social Gatherings with Friends and Family:     Attends Jainism Services:     Active Member of Clubs or Organizations:     Attends Club or Organization Meetings:     Marital Status:    Intimate Partner Violence:     Fear of Current or Ex-Partner:     Emotionally Abused:     Physically Abused:     Sexually Abused:      Prior to Visit Medications    Medication Sig Taking? Authorizing Provider   simvastatin (ZOCOR) 20 MG tablet TAKE 1 TABLET AT BEDTIME Yes Chaparrita Sultana MD   enalapril (VASOTEC) 20 MG tablet TAKE 1 TABLET BY MOUTH 2 TIMES DAILY Yes Chaparrita Sultana MD   omeprazole (PRILOSEC) 40 MG delayed release capsule Take 1 capsule by mouth every morning (before breakfast) Yes Chaparrita Sultana MD   diclofenac (VOLTAREN) 75 MG EC tablet Take 1 tablet by mouth 2 times daily Yes Chaparirta Sultana MD   Vitamin D (CHOLECALCIFEROL) 25 MCG (1000 UT) TABS tablet TAKE 2 TABLETS BY MOUTH DAILY Yes Chaparrita Sultana MD   Omega-3 Fatty Acids (FISH OIL) 1000 MG CAPS Take 1 capsule by mouth 2 times daily Yes Aleyda Duran APRN - CNP   Biotin 1000 MCG TABS Take 1 tablet by mouth daily  Yes Historical Provider, MD   allopurinol (ZYLOPRIM) 100 MG tablet Take 200 mg by mouth daily  Yes Historical Provider, MD   Misc Natural Products (OSTEO BI-FLEX TRIPLE STRENGTH PO) Take 2 capsules by mouth daily.  Yes Historical Provider, MD   Ferrous Sulfate (IRON) 325 (65 Fe) MG TABS TAKE ONE TABLET BY MOUTH DAILY  Chaparrita Sultana MD   niacin (NIASPAN) 1000 MG extended release tablet TAKE ONE TABLET BY MOUTH NIGHTLY  Chaparrita Sultana MD     Allergies   Allergen Reactions    Suprax [Cefixime] Itching     Swollen hands and itching       OBJECTIVE:  Estimated body mass index is 35.3 kg/m² as calculated from the following:    Height as of 7/26/21: 5' 10\" (1.778 m). Weight as of this encounter: 246 lb (111.6 kg). Vitals:    10/19/21 0727   BP: 131/75   Pulse: 72   Temp: 97.8 °F (36.6 °C)   SpO2: 96%   Weight: 246 lb (111.6 kg)       Physical Exam  Vitals and nursing note reviewed. Constitutional:       General: He is not in acute distress. Appearance: He is well-developed. He is not diaphoretic. HENT:      Head: Normocephalic and atraumatic. Right Ear: External ear normal.      Left Ear: External ear normal.      Nose: Nose normal.   Eyes:      General: Lids are normal. No scleral icterus. Right eye: No discharge. Left eye: No discharge. Pupils: Pupils are equal, round, and reactive to light. Neck:      Thyroid: No thyromegaly. Vascular: No JVD. Cardiovascular:      Rate and Rhythm: Normal rate and regular rhythm. Heart sounds: Normal heart sounds. Pulmonary:      Effort: Pulmonary effort is normal. No respiratory distress. Breath sounds: Normal breath sounds. Abdominal:      Palpations: Abdomen is soft. There is mass (3-4cm diameter to the left of the umilicus). There is no hepatomegaly or splenomegaly. Tenderness: There is no abdominal tenderness. Musculoskeletal:      Right lower leg: No edema. Left lower leg: No edema. Skin:     General: Skin is warm and dry. Coloration: Skin is not pale. Findings: No erythema or rash. Comments: Turgor normal   Neurological:      Mental Status: He is oriented to person, place, and time. Psychiatric:         Mood and Affect: Mood normal.         Behavior: Behavior normal.         Thought Content: Thought content normal.         Judgment: Judgment normal.              ASSESSMENT PLAN      Diagnosis Orders   1. Essential hypertension, benign     2. Vitamin D deficiency  VITAMIN D 25 HYDROXY   3. Mixed hyperlipidemia  LIPID PANEL   4. Hyperuricemia  URIC ACID   5. Gastroesophageal reflux disease without esophagitis     6. Idiopathic chronic gout of foot without tophus, unspecified laterality     7. Primary osteoarthritis involving multiple joints     8. Severe obstructive sleep apnea     9. Hyperglycemia     10. Tobacco abuse     Blood pressure acceptable. Vitamin D levels will be monitored as needed continue lipid monitoring as needed. No new gout attacks. Reflux controlled. His arthritis progressing slowly continues to be followed by rheumatology. Still using CPAP. No symptoms of elevated sugar. Tobacco abuse - Pt will not quit despite understanding of risks of continued tobacco use, including cancer, heart attacks, strokes or death. Patient states that for the first year they will have different formulary coverage, his current niacin will not be covered, he is going to call us after the first years reminders. He is also going to verify the mail away pharmacy for after the first year. We will send the option for niacin 1000 mg extended release that does not come up in our record as Niaspan. We also try to send 325 mg ferrous gluconate in place of ferrous sulfate as his iron will be covered either. We may need to remind him at the time to look for alternatives in his new formulary book as well to help guide us as what to order, if anything will be available for coverage. Regular follow-up 6 months. Patient should call the office immediately with new or ongoing signs or symptoms or worsening, or proceed to the emergency room. No changes in past medical history, past surgical history, social history, or family history were noted during the patient encounter unless specifically listed above. All updates of past medical history, past surgical history, social history, or family history were reviewed personally by me during the office visit. All problems listed in the assessment are stable unless noted otherwise.   Medication profile reviewed personally by me during the visit. Medication side effects and possible impairments from medications were discussed as applicable. This document was prepared by a combination of typing and transcription through a voice recognition software. Scribe attestation: Philly Webb RN, am scribing for and in the presence of Jude Perez MD. Electronically signed by Maria Del Rosario Lockett RN on 10/19/2021 at 7:36 AM      Provider attestation:     I, Dr. Markus Robbins, personally performed the services described in this documentation, as scribed by the above signed scribe in my presence, and it is both accurate and complete. I agree with the ROS and Past Histories independently gathered by the clinical support staff and the remaining scribed note accurately describes my personal service to the patient.       10/19/2021    8:32 AM

## 2021-10-19 NOTE — PATIENT INSTRUCTIONS

## 2021-10-20 LAB
CHOLESTEROL, TOTAL: 130 MG/DL (ref 0–199)
HDLC SERPL-MCNC: 26 MG/DL (ref 40–60)
LDL CHOLESTEROL CALCULATED: 81 MG/DL
TRIGL SERPL-MCNC: 117 MG/DL (ref 0–150)
URIC ACID, SERUM: 5.8 MG/DL (ref 3.5–7.2)
VLDLC SERPL CALC-MCNC: 23 MG/DL

## 2021-10-24 RX ORDER — DICLOFENAC SODIUM 75 MG/1
75 TABLET, DELAYED RELEASE ORAL 2 TIMES DAILY
Qty: 60 TABLET | Refills: 0 | Status: SHIPPED | OUTPATIENT
Start: 2021-10-24 | End: 2021-11-22

## 2021-10-27 RX ORDER — ENALAPRIL MALEATE 20 MG/1
TABLET ORAL
Qty: 60 TABLET | Refills: 0 | Status: SHIPPED | OUTPATIENT
Start: 2021-10-27 | End: 2021-11-24

## 2021-11-16 RX ORDER — NIACIN 1000 MG/1
TABLET, EXTENDED RELEASE ORAL
Qty: 90 TABLET | Refills: 0 | Status: SHIPPED | OUTPATIENT
Start: 2021-11-16 | End: 2022-02-15

## 2021-11-16 RX ORDER — OMEPRAZOLE 40 MG/1
40 CAPSULE, DELAYED RELEASE ORAL
Qty: 90 CAPSULE | Refills: 0 | Status: SHIPPED | OUTPATIENT
Start: 2021-11-16 | End: 2022-02-21

## 2021-11-22 RX ORDER — DICLOFENAC SODIUM 75 MG/1
75 TABLET, DELAYED RELEASE ORAL 2 TIMES DAILY
Qty: 60 TABLET | Refills: 0 | Status: SHIPPED | OUTPATIENT
Start: 2021-11-22 | End: 2021-12-27

## 2021-11-22 NOTE — TELEPHONE ENCOUNTER
Last visit was 10/19/21  Future Appointments   Date Time Provider Aislinn Cardona   12/6/2021  8:20 AM [facility]l - [facility]   4/19/2022  7:40 AM Jarrett Austin MD [address]

## 2021-12-02 RX ORDER — SIMVASTATIN 20 MG
TABLET ORAL
Qty: 30 TABLET | Refills: 0 | Status: SHIPPED | OUTPATIENT
Start: 2021-12-02 | End: 2021-12-27

## 2021-12-06 ENCOUNTER — OFFICE VISIT (OUTPATIENT)
Dept: PULMONOLOGY | Age: 66
End: 2021-12-06
Payer: MEDICARE

## 2021-12-06 VITALS
HEART RATE: 73 BPM | DIASTOLIC BLOOD PRESSURE: 82 MMHG | HEIGHT: 70 IN | BODY MASS INDEX: 35.79 KG/M2 | SYSTOLIC BLOOD PRESSURE: 130 MMHG | RESPIRATION RATE: 20 BRPM | OXYGEN SATURATION: 96 % | WEIGHT: 250 LBS

## 2021-12-06 DIAGNOSIS — E66.9 OBESITY (BMI 30-39.9): ICD-10-CM

## 2021-12-06 DIAGNOSIS — G47.33 SEVERE OBSTRUCTIVE SLEEP APNEA: Primary | ICD-10-CM

## 2021-12-06 DIAGNOSIS — Z71.89 ENCOUNTER FOR BIPAP USE COUNSELING: ICD-10-CM

## 2021-12-06 PROCEDURE — 99213 OFFICE O/P EST LOW 20 MIN: CPT | Performed by: NURSE PRACTITIONER

## 2021-12-06 PROCEDURE — G8427 DOCREV CUR MEDS BY ELIG CLIN: HCPCS | Performed by: NURSE PRACTITIONER

## 2021-12-06 PROCEDURE — 1123F ACP DISCUSS/DSCN MKR DOCD: CPT | Performed by: NURSE PRACTITIONER

## 2021-12-06 PROCEDURE — 4040F PNEUMOC VAC/ADMIN/RCVD: CPT | Performed by: NURSE PRACTITIONER

## 2021-12-06 PROCEDURE — G8417 CALC BMI ABV UP PARAM F/U: HCPCS | Performed by: NURSE PRACTITIONER

## 2021-12-06 PROCEDURE — 4004F PT TOBACCO SCREEN RCVD TLK: CPT | Performed by: NURSE PRACTITIONER

## 2021-12-06 PROCEDURE — 3017F COLORECTAL CA SCREEN DOC REV: CPT | Performed by: NURSE PRACTITIONER

## 2021-12-06 PROCEDURE — G8482 FLU IMMUNIZE ORDER/ADMIN: HCPCS | Performed by: NURSE PRACTITIONER

## 2021-12-06 ASSESSMENT — SLEEP AND FATIGUE QUESTIONNAIRES
HOW LIKELY ARE YOU TO NOD OFF OR FALL ASLEEP IN A CAR, WHILE STOPPED FOR A FEW MINUTES IN TRAFFIC: 0
HOW LIKELY ARE YOU TO NOD OFF OR FALL ASLEEP WHILE SITTING AND TALKING TO SOMEONE: 0
HOW LIKELY ARE YOU TO NOD OFF OR FALL ASLEEP WHILE LYING DOWN TO REST IN THE AFTERNOON WHEN CIRCUMSTANCES PERMIT: 1
HOW LIKELY ARE YOU TO NOD OFF OR FALL ASLEEP WHILE SITTING AND READING: 0
HOW LIKELY ARE YOU TO NOD OFF OR FALL ASLEEP WHEN YOU ARE A PASSENGER IN A CAR FOR AN HOUR WITHOUT A BREAK: 1
HOW LIKELY ARE YOU TO NOD OFF OR FALL ASLEEP WHILE WATCHING TV: 1
HOW LIKELY ARE YOU TO NOD OFF OR FALL ASLEEP WHILE SITTING INACTIVE IN A PUBLIC PLACE: 1
NECK CIRCUMFERENCE (INCHES): 19
ESS TOTAL SCORE: 5
HOW LIKELY ARE YOU TO NOD OFF OR FALL ASLEEP WHILE SITTING QUIETLY AFTER LUNCH WITHOUT ALCOHOL: 1

## 2021-12-06 NOTE — PROGRESS NOTES
Patient ID: Ace Camargo is a 76 y.o. male who is being seen today for   Chief Complaint   Patient presents with    Sleep Apnea     31-90         HPI:   Ace Camargo is a 76 y.o. male in office for AJ follow up. States he is doing okay with BIPAP. States pressure feels too low initially- ramp is on and start is set at 4. Patient is using BiPAP 6-7 hrs/night. Not using humidifier. No snoring on BiPAP. The mask is comfortable-nasal mask. Minimal mask leak. No significant daytime sleepiness. No nodding off when driving. No dry nose or throat. Some fatigue. Bedtime is 1030 pm and rise time is 6 am. Sleep onset is usually 2 minutes. Wakes up 0-1 times at night total. 0-1 nocturia. It takes usually few minutes to fall back a sleep. Occasional naps during the day. No headache in am. No weight gain. 1-3 caffienated beverages during the day. Occasional alcohol. ESS is 5.         Sleep Medicine 12/6/2021 12/4/2020 12/2/2019 11/30/2018 11/30/2018 12/1/2017 11/29/2016   Sitting and reading 0 1 2 0 1 1 0   Watching TV 1 1 3 2 3 2 1   Sitting, inactive in a public place (e.g. a theatre or a meeting) 1 0 1 1 1 1 0   As a passenger in a car for an hour without a break 1 1 3 2 3 1 2   Lying down to rest in the afternoon when circumstances permit 1 1 0 1 1 1 2   Sitting and talking to someone 0 0 0 0 0 0 0   Sitting quietly after a lunch without alcohol 1 1 3 1 1 2 2   In a car, while stopped for a few minutes in traffic 0 0 0 1 2 0 0   Total score 5 5 12 8 12 8 7   Neck circumference 19 - 20 - 19 19.5 20.25       Past Medical History:  Past Medical History:   Diagnosis Date    Arthritis     CTS (carpal tunnel syndrome)     Erectile dysfunction     GERD (gastroesophageal reflux disease)     Hyperglycemia     Hyperlipidemia     Hypertension     Kidney stone 09/2016    Malignant neoplasm of prostate ([facility] [address] 4/19/2021    AJ treated with BiPAP     Osteoarthritis     Trigger thumb     Vitamin D deficiency Past Surgical History:        Procedure Laterality Date    APPENDECTOMY      BACK SURGERY      CARPAL TUNNEL RELEASE  11/12    right    COLONOSCOPY  08/14/2018    diverticulosis    CYSTOSCOPY Right 09/28/2016     RIGHT URETEROSCOPY , RIGHT STENT PLACEMENT    HEMORRHOID SURGERY      JOINT REPLACEMENT      right shoulder replacement    OTHER SURGICAL HISTORY  10/12/2016    CYSTOSCOPY, DIAGNOSTIC RIGHT URETEROSCOPY, RIGHT RETROGRADE    ND COLONOSCOPY FLX DX W/COLLJ SPEC WHEN PFRMD N/A 8/15/2018    COLONOSCOPY performed by Mario Gunter MD at [address] Left 07/02/13    left total shoulder replacement       Allergies:  is allergic to suprax [cefixime]. Social History:    TOBACCO:   reports that he has been smoking cigarettes. He started smoking about 32 years ago. He has a 30.00 pack-year smoking history. He has never used smokeless tobacco.  ETOH:   reports current alcohol use.     Family History:       Problem Relation Age of Onset    Heart Disease Mother     High Blood Pressure Mother     Arthritis Mother     Heart Disease Father     Arthritis Father        Current Medications:    Current Outpatient Medications:     simvastatin (ZOCOR) 20 MG tablet, TAKE 1 TABLET AT BEDTIME, Disp: 30 tablet, Rfl: 0    enalapril (VASOTEC) 20 MG tablet, TAKE 1 TABLET BY MOUTH 2 TIMES DAILY, Disp: 60 tablet, Rfl: 5    diclofenac (VOLTAREN) 75 MG EC tablet, TAKE 1 TABLET BY MOUTH 2 TIMES DAILY, Disp: 60 tablet, Rfl: 0    omeprazole (PRILOSEC) 40 MG delayed release capsule, Take 1 capsule by mouth every morning (before breakfast), Disp: 90 capsule, Rfl: 0    niacin (NIASPAN) 1000 MG extended release tablet, TAKE ONE TABLET BY MOUTH NIGHTLY, Disp: 90 tablet, Rfl: 0    Ferrous Sulfate (IRON) 325 (65 Fe) MG TABS, TAKE ONE TABLET BY MOUTH DAILY, Disp: 100 tablet, Rfl: 5    Vitamin D (CHOLECALCIFEROL) 25 MCG (1000 UT) TABS tablet, TAKE 2 TABLETS BY MOUTH DAILY, Disp: 100 tablet, Rfl: 0    Omega-3 Fatty Acids (FISH OIL) 1000 MG CAPS, Take 1 capsule by mouth 2 times daily, Disp: 360 capsule, Rfl: 1    Biotin 1000 MCG TABS, Take 1 tablet by mouth daily , Disp: , Rfl:     allopurinol (ZYLOPRIM) 100 MG tablet, Take 200 mg by mouth daily , Disp: , Rfl:     Misc Natural Products (OSTEO BI-FLEX TRIPLE STRENGTH PO), Take 2 capsules by mouth daily. , Disp: , Rfl:       Objective:   PHYSICAL EXAM:    /82   Pulse 73   Resp 20   Ht 5' 10\" (1.778 m)   Wt 250 lb (113.4 kg)   SpO2 96%   BMI 35.87 kg/m²     Physical exam:   Gen: No acute distress. Obese. BMI of 35.87  Eyes: PERRL. No sclera icterus. No conjunctival injection. ENT: No discharge. Neck: Trachea midline. No obvious mass. Neck circumference 19\"  Resp: No accessory muscle use. No crackles. No wheezes. No rhonchi. CV: Regular rate. Regular rhythm. No murmur or rub. Skin: Warm and dry. M/S: No cyanosis. No obvious joint deformity. Neuro: Awake. Alert. Moves all four extremities. Psych: Oriented x 3. No anxiety. DATA:   2/17/2014 HST AHI 61, low SPO2 50%  3/14/2014 Pap titration controlled sleep-related breathing with BiPAP, PLMS 75, recommendations BiPAP 17/13 cm H2O    BIPAP compliance data:  Compliance download report from 11/1/17 to 11/30/17 reviewed today by me and showed patient is using machine 7:22 hrs/night with 100% compliance and AHI 4.1 within this time frame. 30/30days with greater than 4 hours of machine use. BIPAP 17/13 cm H20    Compliance download report from 10/31/18 to 11/29/18 reviewed today by me and showed patient is using machine 6:52 hrs/night with 100% compliance and AHI 5.7 within this time frame. 30/30days with greater than 4 hours of machine use. BIPAP 17/13 cm H20    BiPAP compliance report from 12/1/1812/30/18 on BiPAP 18/14 cm H2O reviewed. Compliance is good 100%. AHI is improving still slightly elevated 5.2.      BiPAP compliance report from 1/8/192/6/19 on BiPAP 19/15 cm H2O reviewed. Compliance is good 100% AHI is good 3.9    Compliance download report from 11/2/19 to 12/1/19 reviewed today by me and showed patient is using machine 7:26 hrs/night with 100% compliance and AHI 4.1 within this time frame. 30/30days with greater than 4 hours of machine use. BIPAP 19/15 cm H20    12/4/2020unable to obtain BiPAP download report. Patient has older BiPAP with no modem    BiPAP compliance report from 11/4/202012/3/2020 on BiPAP 19/15 cm H2O reviewed. Compliance is great 100%. AHI is good 3.2. New BIPAP:  Compliance download report from 11/3/21 to 12/2/21 reviewed today by me and showed patient is using machine 7:37 hrs/night with 100% compliance and AHI 2.9 within this time frame. 30/30days with greater than 4 hours of machine use. BIPAP 19/15 cm H20     Assessment:      · Severe AJ. BIPAP 19/15 cm H2O. Optimal compliance and efficacy on review today. · Fatigue  · Obesity  · Sleep maintenance insomniapsychophysiological hyperarousal, muscle pain likely contributing    Plan:       - Continue BIPAP 19/15cm H2O.  -Turned ramp start off. Changed ramp start to 11 cm H2O (was on 4 cm H2O) if patient would like to turn back on  - Patient to call DME if he would like to try different mask. - Advised to use BiPAP 6-8 hrs at night and during naps. - Replacement of mask, tubing, head straps every 3-6 months or sooner if damaged. - Patient instructed to contact DME company for any mask, tubing or machine trouble shooting if problems arise.  - Sleep hygiene  Cognitive behavioral therapy was discussed with patient including stimulus control and sleep restriction   -Continue to work with PCP for muscle/joint pain as this is likely contributing to poor sleep recently  - Avoid sedatives, alcohol and caffeinated drinks at bed time.   - Patient counseled to never drive or operate heavy machinery while fatigue, drowsy or sleepy- patient verbalized understanding and agrees.    - Weight loss is recommended as a long-term intervention.    - Complications of AJ if not treated were discussed with patient patient, including: systemic hypertension, pulmonary hypertension, cardiovascular morbidities, car accidents and all cause mortality.  -Patient education provided regarding sleep tips and PAP cleaning recommendations     Follow up: 1 year, sooner if needed

## 2021-12-20 ENCOUNTER — TELEPHONE (OUTPATIENT)
Dept: CASE MANAGEMENT | Age: 66
End: 2021-12-20

## 2021-12-20 DIAGNOSIS — Z72.0 TOBACCO ABUSE: Primary | ICD-10-CM

## 2021-12-20 NOTE — TELEPHONE ENCOUNTER
Patient due for annual CT Lung Screening. If you would like patient to have screening, please place order for CT Lung Screening ([facility] [address]). Patient due for annual CT Lung Screening. Reminder letter mailed.       Thank you,  Kamilla Tilley RN  [facility] Lung Navigator  [phone]

## 2021-12-27 RX ORDER — SIMVASTATIN 20 MG
TABLET ORAL
Qty: 30 TABLET | Refills: 0 | Status: SHIPPED | OUTPATIENT
Start: 2021-12-27 | End: 2022-01-31

## 2021-12-27 RX ORDER — CHLORAL HYDRATE 500 MG
CAPSULE ORAL
Qty: 300 CAPSULE | Refills: 0 | Status: SHIPPED | OUTPATIENT
Start: 2021-12-27 | End: 2022-07-21

## 2021-12-27 RX ORDER — DICLOFENAC SODIUM 75 MG/1
75 TABLET, DELAYED RELEASE ORAL 2 TIMES DAILY
Qty: 60 TABLET | Refills: 0 | Status: SHIPPED | OUTPATIENT
Start: 2021-12-27 | End: 2022-01-25

## 2022-01-11 ENCOUNTER — TELEPHONE (OUTPATIENT)
Dept: FAMILY MEDICINE CLINIC | Age: 67
End: 2022-01-11

## 2022-01-11 DIAGNOSIS — Z72.0 TOBACCO ABUSE: Primary | ICD-10-CM

## 2022-01-11 NOTE — TELEPHONE ENCOUNTER
----- Message from Cristino Morse sent at 1/11/2022  3:44 PM EST -----  Subject: Message to Provider    QUESTIONS  Information for Provider? Patient received letter in mail stating it ws   time for his annual Lung Ct to follow up cancer Can Dr. Vivien Sawyer order   this? Please advise   ---------------------------------------------------------------------------  --------------  CALL BACK INFO  What is the best way for the office to contact you? OK to leave message on   voicemail  Preferred Call Back Phone Number? [phone]  ---------------------------------------------------------------------------  --------------  SCRIPT ANSWERS  Relationship to Patient?  Self

## 2022-01-14 ENCOUNTER — HOSPITAL ENCOUNTER (OUTPATIENT)
Dept: CT IMAGING | Age: 67
Discharge: HOME | End: 2022-01-14
Payer: MEDICARE

## 2022-01-14 DIAGNOSIS — Z72.0 TOBACCO ABUSE: ICD-10-CM

## 2022-01-14 PROCEDURE — 71271 CT THORAX LUNG CANCER SCR C-: CPT

## 2022-01-25 RX ORDER — DICLOFENAC SODIUM 75 MG/1
75 TABLET, DELAYED RELEASE ORAL 2 TIMES DAILY
Qty: 60 TABLET | Refills: 0 | Status: SHIPPED | OUTPATIENT
Start: 2022-01-25 | End: 2022-02-21

## 2022-01-25 RX ORDER — VITAMIN B COMPLEX
TABLET ORAL
Qty: 200 TABLET | Refills: 0 | Status: SHIPPED | OUTPATIENT
Start: 2022-01-25 | End: 2022-07-26

## 2022-01-31 RX ORDER — SIMVASTATIN 20 MG
TABLET ORAL
Qty: 30 TABLET | Refills: 0 | Status: SHIPPED | OUTPATIENT
Start: 2022-01-31 | End: 2022-02-21

## 2022-02-15 RX ORDER — NIACIN 1000 MG/1
TABLET, EXTENDED RELEASE ORAL
Qty: 90 TABLET | Refills: 0 | Status: SHIPPED | OUTPATIENT
Start: 2022-02-15 | End: 2022-05-17

## 2022-02-21 RX ORDER — SIMVASTATIN 20 MG
TABLET ORAL
Qty: 30 TABLET | Refills: 5 | Status: SHIPPED | OUTPATIENT
Start: 2022-02-21 | End: 2022-08-29

## 2022-02-21 RX ORDER — DICLOFENAC SODIUM 75 MG/1
75 TABLET, DELAYED RELEASE ORAL 2 TIMES DAILY
Qty: 60 TABLET | Refills: 5 | Status: SHIPPED | OUTPATIENT
Start: 2022-02-21 | End: 2022-08-25

## 2022-02-21 RX ORDER — OMEPRAZOLE 40 MG/1
40 CAPSULE, DELAYED RELEASE ORAL
Qty: 90 CAPSULE | Refills: 1 | Status: SHIPPED | OUTPATIENT
Start: 2022-02-21 | End: 2022-08-25

## 2022-02-21 NOTE — TELEPHONE ENCOUNTER
10/19/21    4/19/22
key part of your child's treatment and safety. Be sure to make and go to all appointments, and call your doctor if your child is having problems. It's also a good idea to know your child's test results and keep a list of the medicines your child takes. How can you care for your child at home? · Watch for signs of dehydration, which means that the body has lost too much water. Your child's mouth may feel very dry. He or she may have sunken eyes with few tears when crying. Your child may lack energy and want to be held a lot. He or she may not urinate as often as usual.  · Offer your child small sips of water. Let your child drink as much as he or she wants. · Ask your doctor if your child needs an oral rehydration solution (ORS) such as Pedialyte or Infalyte. These drinks contain a mix of salt, sugar, and minerals. You can buy them at drugstores or grocery stores. Do not use them as the only source of liquids or food for more than 12 to 24 hours. · Gradually start to offer your child regular foods after 6 hours with no vomiting.  ? Offer your child solid foods if he or she usually eats solid foods. ? Let your child eat what he or she prefers. · Do not give your child over-the-counter antidiarrhea or upset-stomach medicines without talking to your doctor first. Jak Herb not give Pepto-Bismol or other medicines that contain salicylates (a form of aspirin) or aspirin. Aspirin has been linked to Reye syndrome, a serious illness. When should you call for help? Call 911 anytime you think your child may need emergency care. For example, call if:    · Your child seems very sick or is hard to wake up. Call your doctor now or seek immediate medical care if:    · Your child seems to be getting sicker.     · Your child has signs of needing more fluids.  These signs include sunken eyes with few tears, a dry mouth with little or no spit, and little or no urine for 6 hours.     · Your child has new or worse belly pain.

## 2022-02-22 ENCOUNTER — HOSPITAL ENCOUNTER (OUTPATIENT)
Age: 67
Discharge: HOME | End: 2022-02-22
Payer: MEDICARE

## 2022-02-22 PROCEDURE — 36415 COLL VENOUS BLD VENIPUNCTURE: CPT

## 2022-02-22 PROCEDURE — 84153 ASSAY OF PSA TOTAL: CPT

## 2022-02-23 LAB — PROSTATE SPECIFIC ANTIGEN: <0.01 NG/ML (ref 0–4)

## 2022-04-19 ENCOUNTER — OFFICE VISIT (OUTPATIENT)
Dept: FAMILY MEDICINE CLINIC | Age: 67
End: 2022-04-19
Payer: MEDICARE

## 2022-04-19 VITALS
HEIGHT: 70 IN | WEIGHT: 245 LBS | BODY MASS INDEX: 35.07 KG/M2 | SYSTOLIC BLOOD PRESSURE: 132 MMHG | HEART RATE: 76 BPM | OXYGEN SATURATION: 97 % | DIASTOLIC BLOOD PRESSURE: 74 MMHG

## 2022-04-19 DIAGNOSIS — F51.01 PRIMARY INSOMNIA: ICD-10-CM

## 2022-04-19 DIAGNOSIS — H53.8 BLURRED VISION, BILATERAL: ICD-10-CM

## 2022-04-19 DIAGNOSIS — M79.604 BILATERAL LEG PAIN: ICD-10-CM

## 2022-04-19 DIAGNOSIS — M79.605 BILATERAL LEG PAIN: ICD-10-CM

## 2022-04-19 DIAGNOSIS — G47.33 SEVERE OBSTRUCTIVE SLEEP APNEA: ICD-10-CM

## 2022-04-19 DIAGNOSIS — I10 ESSENTIAL HYPERTENSION, BENIGN: ICD-10-CM

## 2022-04-19 DIAGNOSIS — K21.9 GASTROESOPHAGEAL REFLUX DISEASE WITHOUT ESOPHAGITIS: ICD-10-CM

## 2022-04-19 DIAGNOSIS — C61 MALIGNANT NEOPLASM OF PROSTATE (HCC): ICD-10-CM

## 2022-04-19 DIAGNOSIS — E55.9 VITAMIN D DEFICIENCY: ICD-10-CM

## 2022-04-19 DIAGNOSIS — R09.89 DECREASED PULSES IN FEET: ICD-10-CM

## 2022-04-19 DIAGNOSIS — Z72.0 TOBACCO ABUSE: ICD-10-CM

## 2022-04-19 DIAGNOSIS — E78.2 MIXED HYPERLIPIDEMIA: ICD-10-CM

## 2022-04-19 DIAGNOSIS — H57.9 EYE PRESSURE: Primary | ICD-10-CM

## 2022-04-19 DIAGNOSIS — I73.9 INTERMITTENT CLAUDICATION (HCC): ICD-10-CM

## 2022-04-19 PROCEDURE — 4004F PT TOBACCO SCREEN RCVD TLK: CPT | Performed by: FAMILY MEDICINE

## 2022-04-19 PROCEDURE — 1123F ACP DISCUSS/DSCN MKR DOCD: CPT | Performed by: FAMILY MEDICINE

## 2022-04-19 PROCEDURE — 3017F COLORECTAL CA SCREEN DOC REV: CPT | Performed by: FAMILY MEDICINE

## 2022-04-19 PROCEDURE — 99214 OFFICE O/P EST MOD 30 MIN: CPT | Performed by: FAMILY MEDICINE

## 2022-04-19 PROCEDURE — G8427 DOCREV CUR MEDS BY ELIG CLIN: HCPCS | Performed by: FAMILY MEDICINE

## 2022-04-19 PROCEDURE — G8417 CALC BMI ABV UP PARAM F/U: HCPCS | Performed by: FAMILY MEDICINE

## 2022-04-19 PROCEDURE — 4040F PNEUMOC VAC/ADMIN/RCVD: CPT | Performed by: FAMILY MEDICINE

## 2022-04-19 NOTE — PROGRESS NOTES
Chief Complaint   Patient presents with    Hypertension     Patient does not monitor his BP at home. Denies edema, SOB or chest pains    Sleep Apnea    Gout        Internal Administration   First Dose COVID-19, Moderna, Primary or Immunocompromised, PF, 100mcg/0.5mL  2021   Second Dose COVID-19, Moderna, Primary or Immunocompromised, PF, 100mcg/0.5mL   2021       Last COVID Lab No results found for: SARS-COV-2, SARS-COV-2 RNA, SARS-COV-2, SARS-COV-2, SARS-COV-2 BY PCR, SARS-COV-2, SARS-COV-2, SARS-COV-2          Wt Readings from Last 3 Encounters:   22 245 lb (111.1 kg)   22 250 lb (113.4 kg)   21 250 lb (113.4 kg)     BP Readings from Last 3 Encounters:   22 132/74   22 (!) 144/86   21 130/82      Lab Results   Component Value Date    LABA1C 5.6 2018    LABA1C 5.9 2018       HPI:  Mayito Barbosa is a 78 y.o. (: 1955) here today for    Patient states that he has been having sinus issues, discussed medication to help with sinuses he can get OTC. He later described it as a feeling of his eyes going to bulge out, he admits to not seeing an eye doctor since he was in high school. Informed him that he could be having some glaucoma and that he needs to see an eye doctor. Referred to Dr. Wang July. patient states that he was working to lean out his corn bins and after he developed extreme pain in bilateral legs. He is seeing chiropractic and they have been working to stretch out the muscle. He does have differing pulses in the bilateral legs so determined to ensure this is not caused by a blood flow issue will have him complete a bilateral arterial doppler of the legs.      [x] Patient has completed an advance directive  [] Patient has NOT completed an advanced directive  [x] Patient has a documented healthcare surrogate  [] Patient does NOT have a documented healthcare surrogate  [] Discussed the importance of establishing and updating an advanced directive. Patient has questions at this time and those were answered. [x] Discussed the importance of establishing and updating an advanced directive. Patient does NOT have questions at this time. Discussed with: [x] Patient            [] Family             [] Other caregiver    Patient's medications, allergies, past medical, surgical, social and family histories were reviewed and updated asappropriate on 4/19/2022 at 7:51 AM.    ROS:  Review of Systems    All other systems reviewed and are negative except as noted above on 4/19/2022 at 7:51 AM. Additional review of systems may be scanned into the media section ofthis medical record. Any responses requiring further intervention were pursued. Past Medical History:   Diagnosis Date    Arthritis     CTS (carpal tunnel syndrome)     Erectile dysfunction     GERD (gastroesophageal reflux disease)     Hyperglycemia     Hyperlipidemia     Hypertension     Kidney stone 09/2016    Malignant neoplasm of prostate ([facility] [address] 4/19/2021    AJ treated with BiPAP     Osteoarthritis     Trigger thumb     Vitamin D deficiency      Family History   Problem Relation Age of Onset    Heart Disease Mother     High Blood Pressure Mother     Arthritis Mother     Heart Disease Father     Arthritis Father      Social History     Socioeconomic History    Marital status:      Spouse name: Not on file    Number of children: Not on file    Years of education: Not on file    Highest education level: Not on file   Occupational History    Not on file   Tobacco Use    Smoking status: Current Every Day Smoker     Packs/day: 1.00     Years: 30.00     Pack years: 30.00     Types: Cigarettes     Start date: 1/1/1989    Smokeless tobacco: Never Used   Substance and Sexual Activity    Alcohol use:  Yes     Alcohol/week: 0.0 standard drinks     Comment: occasionally -2 x month    Drug use: No    Sexual activity: Yes     Partners: Female   Other Topics Concern    Not on file   Social History Narrative    Not on file     Social Determinants of Health     Financial Resource Strain: Low Risk     Difficulty of Paying Living Expenses: Not hard at all   Food Insecurity: No Food Insecurity    Worried About Running Out of Food in the Last Year: Never true    [address] in the Last Year: Never true   Transportation Needs:     Lack of Transportation (Medical): Not on file    Lack of Transportation (Non-Medical): Not on file   Physical Activity:     Days of Exercise per Week: Not on file    Minutes of Exercise per Session: Not on file   Stress:     Feeling of Stress : Not on file   Social Connections:     Frequency of Communication with Friends and Family: Not on file    Frequency of Social Gatherings with Friends and Family: Not on file    Attends Rastafari Services: Not on file    Active Member of [address] [place] [address] or Organizations: Not on file    Attends Club or Organization Meetings: Not on file    Marital Status: Not on file   Intimate Partner Violence:     Fear of Current or Ex-Partner: Not on file    Emotionally Abused: Not on file    Physically Abused: Not on file    Sexually Abused: Not on file   Housing Stability:     Unable to Pay for Housing in the Last Year: Not on file    Number of [place] in the Last Year: Not on file    Unstable Housing in the Last Year: Not on file     Prior to Visit Medications    Medication Sig Taking?  Authorizing Provider   omeprazole (PRILOSEC) 40 MG delayed release capsule Take 1 capsule by mouth every morning (before breakfast) Yes Rogelio An MD   simvastatin (ZOCOR) 20 MG tablet TAKE 1 TABLET AT BEDTIME Yes Rogelio An MD   diclofenac (VOLTAREN) 75 MG EC tablet TAKE 1 TABLET BY MOUTH 2 TIMES DAILY Yes Rogelio An MD   niacin (NIASPAN) 1000 MG extended release tablet TAKE ONE TABLET BY MOUTH NIGHTLY Yes Rogelio An MD   Vitamin D (CHOLECALCIFEROL) 25 MCG (1000 UT) TABS tablet TAKE 2 TABLETS BY MOUTH DAILY Yes Jeannine Teague MD   Omega-3 Fatty Acids (FISH OIL) 1000 MG CAPS TAKE 6 CAPSULES TWICE DAILY Yes KARLOS Coats CNP   enalapril (VASOTEC) 20 MG tablet TAKE 1 TABLET BY MOUTH 2 TIMES DAILY Yes Jeannine Teague MD   Ferrous Sulfate (IRON) 325 (65 Fe) MG TABS TAKE ONE TABLET BY MOUTH DAILY Yes Jeannine Teague MD   Biotin 1000 MCG TABS Take 1 tablet by mouth daily  Yes Historical Provider, MD   allopurinol (ZYLOPRIM) 100 MG tablet Take 200 mg by mouth daily  Yes Historical Provider, MD   Misc Natural Products (OSTEO BI-FLEX TRIPLE STRENGTH PO) Take 2 capsules by mouth daily. Yes Historical Provider, MD     Allergies   Allergen Reactions    Suprax [Cefixime] Itching     Swollen hands and itching       OBJECTIVE:  Estimated body mass index is 35.15 kg/m² as calculated from the following:    Height as of this encounter: 5' 10\" (1.778 m). Weight as of this encounter: 245 lb (111.1 kg). Vitals:    04/19/22 0740   BP: 132/74   Site: Left Upper Arm   Position: Sitting   Cuff Size: Large Adult   Pulse: 76   SpO2: 97%   Weight: 245 lb (111.1 kg)   Height: 5' 10\" (1.778 m)       Physical Exam  Vitals and nursing note reviewed. Constitutional:       General: He is not in acute distress. Appearance: He is well-developed. He is not diaphoretic. HENT:      Head: Normocephalic and atraumatic. Right Ear: External ear normal.      Left Ear: External ear normal.      Nose: Nose normal.   Eyes:      General: Lids are normal. No scleral icterus. Right eye: No discharge. Left eye: No discharge. Pupils: Pupils are equal, round, and reactive to light. Neck:      Thyroid: No thyromegaly. Vascular: No JVD. Cardiovascular:      Rate and Rhythm: Normal rate and regular rhythm. Pulses:           Dorsalis pedis pulses are 1+ on the right side and 2+ on the left side.         Posterior tibial pulses are 1+ on the right side and 2+ on the left side. Heart sounds: Normal heart sounds. Pulmonary:      Effort: Pulmonary effort is normal. No respiratory distress. Breath sounds: Normal breath sounds. Abdominal:      Palpations: Abdomen is soft. There is no hepatomegaly or splenomegaly. Tenderness: There is no abdominal tenderness. Comments: seroma of the left lower abdomen, small additional one noted to the right of the large one that is about the size of a finger tip   Musculoskeletal:      Right lower leg: No edema. Left lower leg: No edema. Skin:     General: Skin is warm and dry. Coloration: Skin is not pale. Findings: No erythema or rash. Comments: Turgor normal   Neurological:      Mental Status: He is oriented to person, place, and time. Psychiatric:         Mood and Affect: Mood normal.         Behavior: Behavior normal.         Thought Content: Thought content normal.         Judgment: Judgment normal.              ASSESSMENT PLAN      Diagnosis Orders   1. Eye pressure  Richmond Jacome MD, Ophthalmology, [facility]   2. Malignant neoplasm of prostate ([address]     3. Bilateral leg pain  VL DUP LOWER EXTREMITY ARTERIES BILATERAL   4. Decreased pulses in feet  VL DUP LOWER EXTREMITY ARTERIES BILATERAL   5. Essential hypertension, benign     6. Mixed hyperlipidemia     7. Vitamin D deficiency     8. Primary insomnia     9. Tobacco abuse     10. Gastroesophageal reflux disease without esophagitis     11. Severe obstructive sleep apnea     12. Blurred vision, bilateral     13. Intermittent claudication ([address]     Patient was assuming his eye blurring and eye pressure was related to sinus but I told him he needed his eyes checked they have not been checked since high school. Prostate cancer shows no recurrence. Leg discomfort worsened when he was doubling grain, with his smoking history will rule out arterial insufficiency of both legs.   Current blood pressure readings in the office or at home are acceptable and current antihypertensive medications as listed in the medication list require no change. Lipids will be monitored based upon levels requiring treatment and other cardiac risks. Medications for hyperlipidemia and hypertriglyceridemia as listed on the medication list will be changed as necessary to reach control parameters. Vitamin D levels will be monitored as needed and changes to medications related to vitamin D as listed in the medication list will be changed to maintain parameters as needed. Sleeping fairly well. He is trying to cut back on tobacco use does not want any prescriptions. Reflux symptoms based upon patient's history is controlled and no changes in medications for reflux as listed in the medication profile is necessary. Still treating sleep apnea. Follow-up 6 months    Patient should call the office immediately with new or ongoing signs or symptoms or worsening, or proceed to the emergency room. No changes in past medical history, past surgical history, social history, or family history were noted during the patient encounter unless specifically listed above. All updates of past medical history, past surgical history, social history, or family history were reviewed personally by me during the office visit. All problems listed in the assessment are stable unless noted otherwise. Medication profile reviewed personally by me during the visit. Medication side effects and possible impairments from medications were discussed as applicable. This document was prepared by a combination of typing and transcription through a voice recognition software.                 Scribe attestation: Perla Soria RN, am scribing for and in the presence of Ximena Parra MD. Electronically signed by Shelly Crowley RN on 4/19/2022 at 7:51 AM      Provider attestation:     I, Dr. Ramon Wilson, personally performed the services described in this documentation, as scribed by the above signed scribe in my presence, and it is both accurate and complete. I agree with the ROS and Past Histories independently gathered by the clinical support staff and the remaining scribed note accurately describes my personal service to the patient.       4/19/2022    8:18 AM

## 2022-04-19 NOTE — PATIENT INSTRUCTIONS
You can take claritin, allegra, zyrtec and any of generics of these medications to help with your sinuses. Call [phone] to schedule for the leg doppler study      Patient should call the office immediately with new or ongoing signs or symptoms or worsening, or proceed to the emergency room. If you are on medications which could impair your senses, you are at risk of weakness, falls, dizziness, or drowsiness. You should be careful during activities which could place you at risk of harm, such as climbing, using stairs, operating machinery, or driving vehicles. If you feel you cannot safely do these activities, you should request others to help you, or avoid the activities altogether. If you are drowsy for any other reason, you should use the same precautions as listed above.      Web Address for Advance Directive:    [email or web address]

## 2022-05-11 ENCOUNTER — HOSPITAL ENCOUNTER (OUTPATIENT)
Dept: VASCULAR LAB | Age: 67
Discharge: HOME | End: 2022-05-11
Payer: MEDICARE

## 2022-05-11 DIAGNOSIS — R09.89 DECREASED PULSES IN FEET: ICD-10-CM

## 2022-05-11 DIAGNOSIS — M79.604 BILATERAL LEG PAIN: ICD-10-CM

## 2022-05-11 DIAGNOSIS — M79.605 BILATERAL LEG PAIN: ICD-10-CM

## 2022-05-11 PROCEDURE — 93925 LOWER EXTREMITY STUDY: CPT

## 2022-05-16 DIAGNOSIS — M79.605 PAIN IN BOTH LOWER EXTREMITIES: Primary | ICD-10-CM

## 2022-05-16 DIAGNOSIS — M79.604 PAIN IN BOTH LOWER EXTREMITIES: Primary | ICD-10-CM

## 2022-05-17 RX ORDER — NIACIN 1000 MG/1
TABLET, EXTENDED RELEASE ORAL
Qty: 90 TABLET | Refills: 0 | Status: SHIPPED | OUTPATIENT
Start: 2022-05-17 | End: 2022-07-26

## 2022-05-17 NOTE — TELEPHONE ENCOUNTER
Future appt scheduled 10/25/2022                 Last appt 04/19/2022      Last Written 02/15/2022    niacin (NIASPAN) 1000 MG extended release tablet  #90  0 RF

## 2022-05-26 RX ORDER — ENALAPRIL MALEATE 20 MG/1
TABLET ORAL
Qty: 60 TABLET | Refills: 5 | Status: SHIPPED | OUTPATIENT
Start: 2022-05-26

## 2022-05-27 ENCOUNTER — PROCEDURE VISIT (OUTPATIENT)
Dept: NEUROLOGY | Age: 67
End: 2022-05-27
Payer: MEDICARE

## 2022-05-27 DIAGNOSIS — M79.604 BILATERAL LEG PAIN: Primary | ICD-10-CM

## 2022-05-27 DIAGNOSIS — M79.605 BILATERAL LEG PAIN: Primary | ICD-10-CM

## 2022-05-27 PROCEDURE — 95909 NRV CNDJ TST 5-6 STUDIES: CPT | Performed by: PSYCHIATRY & NEUROLOGY

## 2022-05-27 PROCEDURE — 95886 MUSC TEST DONE W/N TEST COMP: CPT | Performed by: PSYCHIATRY & NEUROLOGY

## 2022-05-27 NOTE — PROGRESS NOTES
Salvatore Atkins M.D. [facility]) Physicians/[facility]  Board Certified in Neurology & Electromyography  [address] [place] [address], [address] [place] [address]    EMG / NERVE CONDUCTION STUDY      PATIENT:  Vandana Lacy       DATE OF EM22     YOB: 1955       REASON FOR EMG:   Bilateral leg pain      REFERRING PHYSICIAN:  Nataliya Dunham MD  [address] [place] [address]. Nery Schwarz     SUMMARY:   Bilateral peroneal and posterior tibial motor nerve studies were normal  Bilateral sural sensory nerve studies were normal  Needle EMG of several muscles in both lower extremities showed evidence of denervation in the S1 nerve innervated muscles on the right side and L5 innervated muscles on the left side. Bilateral lumbar paraspinal muscles also showed evidence of mild denervation. CLINICAL DIAGNOSIS:  Neuropathy versus radiculopathy        EMG RESULTS:   This patient has a mild right S1 nerve root lesion as well as a left L5 nerve root lesion.        ---------------------------------------------  Salvatore Atkins M.D.   Electromyographer / Neurologist

## 2022-05-27 NOTE — PATIENT INSTRUCTIONS
Verbal consent was obtained from patient and/or patient's advocate for in office procedure with Dr. Anne-Marie Bravo (EMG or EEG).

## 2022-05-31 ENCOUNTER — TELEPHONE (OUTPATIENT)
Dept: FAMILY MEDICINE CLINIC | Age: 67
End: 2022-05-31

## 2022-05-31 DIAGNOSIS — G54.4: ICD-10-CM

## 2022-05-31 DIAGNOSIS — G54.4 LESION OF LEFT LUMBOSACRAL NERVE ROOT: ICD-10-CM

## 2022-05-31 DIAGNOSIS — R94.131 DENERVATION CHANGES PRESENT ON ELECTROMYOGRAPHY: Primary | ICD-10-CM

## 2022-05-31 NOTE — TELEPHONE ENCOUNTER
Unable to reach patienrKENDALL to call back  His EMG results showed:     Evidence of nerve pinching and inflammation right S1 and left L5.  These changes are consistent with the MRI findings of the lower spine in 2020.  Patient may wish to first return to Dr. Gennaro Cowden, or consider surgical consult with Dr. Ofelia White.  This appears to be the cause of his leg pain.     Ask patient which option he prefers and we can then place referral

## 2022-06-03 ENCOUNTER — TELEPHONE (OUTPATIENT)
Dept: FAMILY MEDICINE CLINIC | Age: 67
End: 2022-06-03

## 2022-06-03 NOTE — TELEPHONE ENCOUNTER
Looks like office tried to call him last week.   Please review previous result note and call patient with result sedated/intubated

## 2022-06-03 NOTE — TELEPHONE ENCOUNTER
----- Message from Kevin Arzola sent at 6/3/2022  1:50 PM EDT -----  Subject: Results Request    QUESTIONS  Which lab or imaging result is the patient calling about? EMG   Which provider ordered the test?   At what location was the test performed? [facility]  Date the test was performed? 2022-05-27  Additional Information for Provider? PT has questions about results will   like to know what may have cause his current symptoms   ---------------------------------------------------------------------------  --------------  CALL BACK INFO  What is the best way for the office to contact you? OK to leave message on   voicemail  Preferred Call Back Phone Number? [phone]  ---------------------------------------------------------------------------  --------------  SCRIPT ANSWERS  Relationship to Patient?  Self

## 2022-06-06 NOTE — TELEPHONE ENCOUNTER
Patient called and informed again of his EMG results and he states he did already schedule appt with [place]. He asked if there was anything dr. Zahra Booker.  Informed these appointment are what dr. Karyle Bonus recommends at this point

## 2022-06-26 ENCOUNTER — TELEPHONE (OUTPATIENT)
Dept: CASE MANAGEMENT | Age: 67
End: 2022-06-26

## 2022-06-26 DIAGNOSIS — Z12.2 SCREENING FOR LUNG CANCER: Primary | ICD-10-CM

## 2022-06-26 DIAGNOSIS — Z72.0 TOBACCO ABUSE: ICD-10-CM

## 2022-06-26 NOTE — TELEPHONE ENCOUNTER
Patient due for F/U imaging to annual CT Lung Screening 1/14/2022, LRAD3. Recommendation 6 mos LDCT. Please place order. Reminder letter mailed.     Elen Martell 178 Lung Navigator  [phone]

## 2022-07-15 ENCOUNTER — HOSPITAL ENCOUNTER (OUTPATIENT)
Age: 67
Discharge: HOME | End: 2022-07-15
Payer: MEDICARE

## 2022-07-15 ENCOUNTER — HOSPITAL ENCOUNTER (OUTPATIENT)
Dept: MRI IMAGING | Age: 67
Discharge: HOME | End: 2022-07-15
Payer: MEDICARE

## 2022-07-15 DIAGNOSIS — Z01.812 PRE-OPERATIVE LABORATORY EXAMINATION: ICD-10-CM

## 2022-07-15 DIAGNOSIS — M54.16 LUMBAR RADICULITIS: ICD-10-CM

## 2022-07-15 LAB
BASOPHILS ABSOLUTE: 0 K/UL (ref 0–0.2)
BASOPHILS RELATIVE PERCENT: 0.6 %
BUN BLDV-MCNC: 24 MG/DL (ref 7–20)
CREAT SERPL-MCNC: 1.1 MG/DL (ref 0.8–1.3)
EOSINOPHILS ABSOLUTE: 0.2 K/UL (ref 0–0.6)
EOSINOPHILS RELATIVE PERCENT: 3 %
GFR AFRICAN AMERICAN: >60
GFR NON-AFRICAN AMERICAN: >60
HCT VFR BLD CALC: 36.2 % (ref 40.5–52.5)
HEMOGLOBIN: 12.6 G/DL (ref 13.5–17.5)
LYMPHOCYTES ABSOLUTE: 1.4 K/UL (ref 1–5.1)
LYMPHOCYTES RELATIVE PERCENT: 22.4 %
MCH RBC QN AUTO: 32.8 PG (ref 26–34)
MCHC RBC AUTO-ENTMCNC: 34.9 G/DL (ref 31–36)
MCV RBC AUTO: 94.1 FL (ref 80–100)
MONOCYTES ABSOLUTE: 0.6 K/UL (ref 0–1.3)
MONOCYTES RELATIVE PERCENT: 9.4 %
NEUTROPHILS ABSOLUTE: 4.1 K/UL (ref 1.7–7.7)
NEUTROPHILS RELATIVE PERCENT: 64.6 %
PDW BLD-RTO: 13.8 % (ref 12.4–15.4)
PLATELET # BLD: 215 K/UL (ref 135–450)
PMV BLD AUTO: 8.3 FL (ref 5–10.5)
RBC # BLD: 3.85 M/UL (ref 4.2–5.9)
WBC # BLD: 6.4 K/UL (ref 4–11)

## 2022-07-15 PROCEDURE — 84520 ASSAY OF UREA NITROGEN: CPT

## 2022-07-15 PROCEDURE — 36415 COLL VENOUS BLD VENIPUNCTURE: CPT

## 2022-07-15 PROCEDURE — 85025 COMPLETE CBC W/AUTO DIFF WBC: CPT

## 2022-07-15 PROCEDURE — 6360000004 HC RX CONTRAST MEDICATION: Performed by: PAIN MEDICINE

## 2022-07-15 PROCEDURE — A9579 GAD-BASE MR CONTRAST NOS,1ML: HCPCS | Performed by: PAIN MEDICINE

## 2022-07-15 PROCEDURE — 72158 MRI LUMBAR SPINE W/O & W/DYE: CPT

## 2022-07-15 PROCEDURE — 82565 ASSAY OF CREATININE: CPT

## 2022-07-15 RX ADMIN — GADOTERIDOL 20 ML: 279.3 INJECTION, SOLUTION INTRAVENOUS at 08:49

## 2022-07-21 ENCOUNTER — TELEPHONE (OUTPATIENT)
Dept: FAMILY MEDICINE CLINIC | Age: 67
End: 2022-07-21

## 2022-07-21 DIAGNOSIS — R91.1 PULMONARY NODULE SEEN ON IMAGING STUDY: Primary | ICD-10-CM

## 2022-07-21 RX ORDER — CHLORAL HYDRATE 500 MG
CAPSULE ORAL
Qty: 300 CAPSULE | Refills: 0 | Status: SHIPPED | OUTPATIENT
Start: 2022-07-21 | End: 2022-08-26

## 2022-07-21 NOTE — TELEPHONE ENCOUNTER
----- Message from Clancy Gitelman sent at 7/21/2022 10:01 AM EDT -----  Subject: Message to Provider    QUESTIONS  Information for Provider? PT would like to know if insurance would pay for   his 2nd Chest CT-Scan less than a year, please contact PT at [phone]  ---------------------------------------------------------------------------  --------------  [address]  [phone]; OK to leave message on voicemail  ---------------------------------------------------------------------------  --------------  SCRIPT ANSWERS  Relationship to Patient?  Self

## 2022-07-21 NOTE — TELEPHONE ENCOUNTER
Spoke with patient. He has CT lung in Jan and it was recommeded that he repeated the CT lung in 6 months due to increased in the size of Pulmonary nodule. When he went this morning to have the CT lung screen, they would not do it. Stated that his insurance would not cover it and that it would now have to be a Ct of the chest order.

## 2022-07-22 ENCOUNTER — HOSPITAL ENCOUNTER (OUTPATIENT)
Dept: MRI IMAGING | Age: 67
Discharge: HOME | End: 2022-07-22
Payer: MEDICARE

## 2022-07-22 DIAGNOSIS — H57.13 RETRO-ORBITAL PAIN OF BOTH EYES: ICD-10-CM

## 2022-07-22 DIAGNOSIS — H53.8 BLURRED VISION, LEFT EYE: ICD-10-CM

## 2022-07-22 PROCEDURE — 70551 MRI BRAIN STEM W/O DYE: CPT

## 2022-07-22 PROCEDURE — 70540 MRI ORBIT/FACE/NECK W/O DYE: CPT

## 2022-07-25 ENCOUNTER — HOSPITAL ENCOUNTER (OUTPATIENT)
Dept: CT IMAGING | Age: 67
Discharge: HOME | End: 2022-07-25
Payer: MEDICARE

## 2022-07-25 DIAGNOSIS — R91.1 PULMONARY NODULE SEEN ON IMAGING STUDY: ICD-10-CM

## 2022-07-25 PROCEDURE — 71250 CT THORAX DX C-: CPT

## 2022-07-26 RX ORDER — VITAMIN B COMPLEX
TABLET ORAL
Qty: 200 TABLET | Refills: 0 | Status: SHIPPED | OUTPATIENT
Start: 2022-07-26

## 2022-07-26 RX ORDER — NIACIN 1000 MG/1
TABLET, EXTENDED RELEASE ORAL
Qty: 90 TABLET | Refills: 0 | Status: SHIPPED | OUTPATIENT
Start: 2022-07-26

## 2022-08-25 RX ORDER — DICLOFENAC SODIUM 75 MG/1
75 TABLET, DELAYED RELEASE ORAL 2 TIMES DAILY
Qty: 60 TABLET | Refills: 0 | Status: SHIPPED | OUTPATIENT
Start: 2022-08-25 | End: 2022-09-27

## 2022-08-25 RX ORDER — OMEPRAZOLE 40 MG/1
40 CAPSULE, DELAYED RELEASE ORAL
Qty: 90 CAPSULE | Refills: 0 | Status: SHIPPED | OUTPATIENT
Start: 2022-08-25

## 2022-08-26 RX ORDER — CHLORAL HYDRATE 500 MG
CAPSULE ORAL
Qty: 300 CAPSULE | Refills: 0 | Status: SHIPPED | OUTPATIENT
Start: 2022-08-26 | End: 2022-10-03

## 2022-08-29 RX ORDER — SIMVASTATIN 20 MG
TABLET ORAL
Qty: 30 TABLET | Refills: 0 | Status: SHIPPED | OUTPATIENT
Start: 2022-08-29 | End: 2022-10-03

## 2022-09-10 ENCOUNTER — HOSPITAL ENCOUNTER (OUTPATIENT)
Age: 67
Discharge: HOME | End: 2022-09-10
Payer: MEDICARE

## 2022-09-10 PROCEDURE — 36415 COLL VENOUS BLD VENIPUNCTURE: CPT

## 2022-09-10 PROCEDURE — 84153 ASSAY OF PSA TOTAL: CPT

## 2022-09-11 LAB — PROSTATE SPECIFIC ANTIGEN: <0.01 NG/ML (ref 0–4)

## 2022-09-27 RX ORDER — DICLOFENAC SODIUM 75 MG/1
75 TABLET, DELAYED RELEASE ORAL 2 TIMES DAILY
Qty: 60 TABLET | Refills: 0 | Status: SHIPPED | OUTPATIENT
Start: 2022-09-27 | End: 2022-10-26

## 2022-10-03 RX ORDER — SIMVASTATIN 20 MG
TABLET ORAL
Qty: 30 TABLET | Refills: 0 | Status: SHIPPED | OUTPATIENT
Start: 2022-10-03 | End: 2022-11-01

## 2022-10-03 RX ORDER — CHLORAL HYDRATE 500 MG
CAPSULE ORAL
Qty: 300 CAPSULE | Refills: 0 | Status: SHIPPED | OUTPATIENT
Start: 2022-10-03

## 2022-10-17 ENCOUNTER — HOSPITAL ENCOUNTER (OUTPATIENT)
Dept: MRI IMAGING | Age: 67
Discharge: HOME | End: 2022-10-17
Payer: MEDICARE

## 2022-10-17 DIAGNOSIS — M50.21 DISPLACEMENT OF INTERVERTEBRAL DISC OF HIGH CERVICAL REGION: ICD-10-CM

## 2022-10-17 PROCEDURE — 72141 MRI NECK SPINE W/O DYE: CPT

## 2022-10-25 ENCOUNTER — OFFICE VISIT (OUTPATIENT)
Dept: FAMILY MEDICINE CLINIC | Age: 67
End: 2022-10-25
Payer: MEDICARE

## 2022-10-25 VITALS
DIASTOLIC BLOOD PRESSURE: 68 MMHG | SYSTOLIC BLOOD PRESSURE: 128 MMHG | HEART RATE: 96 BPM | OXYGEN SATURATION: 96 % | BODY MASS INDEX: 34.72 KG/M2 | WEIGHT: 242 LBS

## 2022-10-25 DIAGNOSIS — E78.2 MIXED HYPERLIPIDEMIA: ICD-10-CM

## 2022-10-25 DIAGNOSIS — I73.9 INTERMITTENT CLAUDICATION (HCC): ICD-10-CM

## 2022-10-25 DIAGNOSIS — Z00.00 INITIAL MEDICARE ANNUAL WELLNESS VISIT: Primary | ICD-10-CM

## 2022-10-25 DIAGNOSIS — I10 ESSENTIAL HYPERTENSION, BENIGN: ICD-10-CM

## 2022-10-25 DIAGNOSIS — M75.42 IMPINGEMENT SYNDROME OF BOTH SHOULDERS: ICD-10-CM

## 2022-10-25 DIAGNOSIS — E55.9 VITAMIN D DEFICIENCY: ICD-10-CM

## 2022-10-25 DIAGNOSIS — K21.9 GASTROESOPHAGEAL REFLUX DISEASE WITHOUT ESOPHAGITIS: ICD-10-CM

## 2022-10-25 DIAGNOSIS — M15.9 PRIMARY OSTEOARTHRITIS INVOLVING MULTIPLE JOINTS: ICD-10-CM

## 2022-10-25 DIAGNOSIS — M25.551 RIGHT HIP PAIN: ICD-10-CM

## 2022-10-25 DIAGNOSIS — M79.604 RIGHT LEG PAIN: ICD-10-CM

## 2022-10-25 DIAGNOSIS — M75.41 IMPINGEMENT SYNDROME OF BOTH SHOULDERS: ICD-10-CM

## 2022-10-25 LAB
ANION GAP SERPL CALCULATED.3IONS-SCNC: 14 MMOL/L (ref 3–16)
BASOPHILS ABSOLUTE: 0 K/UL (ref 0–0.2)
BASOPHILS RELATIVE PERCENT: 0.7 %
BUN BLDV-MCNC: 17 MG/DL (ref 7–20)
CALCIUM SERPL-MCNC: 9.4 MG/DL (ref 8.3–10.6)
CHLORIDE BLD-SCNC: 105 MMOL/L (ref 99–110)
CHOLESTEROL, TOTAL: 122 MG/DL (ref 0–199)
CO2: 24 MMOL/L (ref 21–32)
CREAT SERPL-MCNC: 1 MG/DL (ref 0.8–1.3)
EOSINOPHILS ABSOLUTE: 0.2 K/UL (ref 0–0.6)
EOSINOPHILS RELATIVE PERCENT: 2.9 %
GFR SERPL CREATININE-BSD FRML MDRD: >60 ML/MIN/{1.73_M2}
GLUCOSE BLD-MCNC: 109 MG/DL (ref 70–99)
HCT VFR BLD CALC: 34.6 % (ref 40.5–52.5)
HDLC SERPL-MCNC: 34 MG/DL (ref 40–60)
HEMOGLOBIN: 11.8 G/DL (ref 13.5–17.5)
LDL CHOLESTEROL CALCULATED: 69 MG/DL
LYMPHOCYTES ABSOLUTE: 1.4 K/UL (ref 1–5.1)
LYMPHOCYTES RELATIVE PERCENT: 21.3 %
MCH RBC QN AUTO: 33.2 PG (ref 26–34)
MCHC RBC AUTO-ENTMCNC: 34 G/DL (ref 31–36)
MCV RBC AUTO: 97.7 FL (ref 80–100)
MONOCYTES ABSOLUTE: 0.5 K/UL (ref 0–1.3)
MONOCYTES RELATIVE PERCENT: 8.1 %
NEUTROPHILS ABSOLUTE: 4.3 K/UL (ref 1.7–7.7)
NEUTROPHILS RELATIVE PERCENT: 67 %
PDW BLD-RTO: 14.2 % (ref 12.4–15.4)
PLATELET # BLD: 233 K/UL (ref 135–450)
PMV BLD AUTO: 8.6 FL (ref 5–10.5)
POTASSIUM SERPL-SCNC: 4.5 MMOL/L (ref 3.5–5.1)
RBC # BLD: 3.54 M/UL (ref 4.2–5.9)
SODIUM BLD-SCNC: 143 MMOL/L (ref 136–145)
TRIGL SERPL-MCNC: 95 MG/DL (ref 0–150)
VITAMIN D 25-HYDROXY: 53.1 NG/ML
VLDLC SERPL CALC-MCNC: 19 MG/DL
WBC # BLD: 6.4 K/UL (ref 4–11)

## 2022-10-25 PROCEDURE — 99214 OFFICE O/P EST MOD 30 MIN: CPT | Performed by: FAMILY MEDICINE

## 2022-10-25 PROCEDURE — G0438 PPPS, INITIAL VISIT: HCPCS | Performed by: FAMILY MEDICINE

## 2022-10-25 PROCEDURE — G8484 FLU IMMUNIZE NO ADMIN: HCPCS | Performed by: FAMILY MEDICINE

## 2022-10-25 PROCEDURE — G8417 CALC BMI ABV UP PARAM F/U: HCPCS | Performed by: FAMILY MEDICINE

## 2022-10-25 PROCEDURE — G8427 DOCREV CUR MEDS BY ELIG CLIN: HCPCS | Performed by: FAMILY MEDICINE

## 2022-10-25 PROCEDURE — 90694 VACC AIIV4 NO PRSRV 0.5ML IM: CPT | Performed by: FAMILY MEDICINE

## 2022-10-25 PROCEDURE — 36415 COLL VENOUS BLD VENIPUNCTURE: CPT | Performed by: FAMILY MEDICINE

## 2022-10-25 PROCEDURE — G0008 ADMIN INFLUENZA VIRUS VAC: HCPCS | Performed by: FAMILY MEDICINE

## 2022-10-25 PROCEDURE — 4004F PT TOBACCO SCREEN RCVD TLK: CPT | Performed by: FAMILY MEDICINE

## 2022-10-25 PROCEDURE — 1123F ACP DISCUSS/DSCN MKR DOCD: CPT | Performed by: FAMILY MEDICINE

## 2022-10-25 PROCEDURE — 3017F COLORECTAL CA SCREEN DOC REV: CPT | Performed by: FAMILY MEDICINE

## 2022-10-25 SDOH — ECONOMIC STABILITY: FOOD INSECURITY: WITHIN THE PAST 12 MONTHS, THE FOOD YOU BOUGHT JUST DIDN'T LAST AND YOU DIDN'T HAVE MONEY TO GET MORE.: NEVER TRUE

## 2022-10-25 SDOH — ECONOMIC STABILITY: FOOD INSECURITY: WITHIN THE PAST 12 MONTHS, YOU WORRIED THAT YOUR FOOD WOULD RUN OUT BEFORE YOU GOT MONEY TO BUY MORE.: NEVER TRUE

## 2022-10-25 ASSESSMENT — COLUMBIA-SUICIDE SEVERITY RATING SCALE - C-SSRS
6. HAVE YOU EVER DONE ANYTHING, STARTED TO DO ANYTHING, OR PREPARED TO DO ANYTHING TO END YOUR LIFE?: NO
2. HAVE YOU ACTUALLY HAD ANY THOUGHTS OF KILLING YOURSELF?: NO
1. WITHIN THE PAST MONTH, HAVE YOU WISHED YOU WERE DEAD OR WISHED YOU COULD GO TO SLEEP AND NOT WAKE UP?: NO

## 2022-10-25 ASSESSMENT — SOCIAL DETERMINANTS OF HEALTH (SDOH): HOW HARD IS IT FOR YOU TO PAY FOR THE VERY BASICS LIKE FOOD, HOUSING, MEDICAL CARE, AND HEATING?: NOT HARD AT ALL

## 2022-10-25 ASSESSMENT — PATIENT HEALTH QUESTIONNAIRE - PHQ9
SUM OF ALL RESPONSES TO PHQ QUESTIONS 1-9: 0
2. FEELING DOWN, DEPRESSED OR HOPELESS: 0
SUM OF ALL RESPONSES TO PHQ QUESTIONS 1-9: 0
1. LITTLE INTEREST OR PLEASURE IN DOING THINGS: 0
SUM OF ALL RESPONSES TO PHQ QUESTIONS 1-9: 0
SUM OF ALL RESPONSES TO PHQ QUESTIONS 1-9: 0
SUM OF ALL RESPONSES TO PHQ9 QUESTIONS 1 & 2: 0

## 2022-10-25 ASSESSMENT — LIFESTYLE VARIABLES
HOW MANY STANDARD DRINKS CONTAINING ALCOHOL DO YOU HAVE ON A TYPICAL DAY: 1 OR 2
HOW OFTEN DO YOU HAVE A DRINK CONTAINING ALCOHOL: MONTHLY OR LESS

## 2022-10-25 NOTE — PROGRESS NOTES
Medicare Annual Wellness Visit  Name: Fariba Gonzalez Date: 10/25/2022   MRN: [id number] Sex: Male   Age: 78 y.o. Ethnicity: Non- / Non    : 1955 Race: White (non-)      Iggy Moreno is here for Hypertension and Medicare AWV    Screenings for behavioral, psychosocial and functional/safety risks, and cognitive dysfunction are all negative except as indicated below. These results, as well as other patient data from the [address] form, are documented in Flowsheets linked to this Encounter. Allergies   Allergen Reactions    Suprax [Cefixime] Itching     Swollen hands and itching         Prior to Visit Medications    Medication Sig Taking? Authorizing Provider   simvastatin (ZOCOR) 20 MG tablet TAKE 1 TABLET AT BEDTIME Yes KARLOS Woody CNP   Omega-3 Fatty Acids (FISH OIL) 1000 MG capsule TAKE 6 CAPSULES TWICE DAILY Yes KARLOS Woody CNP   diclofenac (VOLTAREN) 75 MG EC tablet TAKE 1 TABLET BY MOUTH 2 TIMES DAILY Yes KARLOS Woody CNP   methylPREDNISolone (MEDROL DOSEPACK) 4 MG tablet Take by mouth. Yes Falguni Fink MD   oxyCODONE-acetaminophen (PERCOCET) 5-325 MG per tablet Take 1 tablet by mouth 2 times daily as needed for Pain for up to 30 days.  Yes Falguni Fink MD   omeprazole (PRILOSEC) 40 MG delayed release capsule Take 1 capsule by mouth every morning (before breakfast) Yes KARLOS Woody CNP   Vitamin D (CHOLECALCIFEROL) 25 MCG (1000 UT) TABS tablet TAKE 2 TABLETS BY MOUTH DAILY Yes Waldemar Benjamin MD   niacin (NIASPAN) 1000 MG extended release tablet TAKE ONE TABLET BY MOUTH NIGHTLY Yes Waldemar Benjamin MD   enalapril (VASOTEC) 20 MG tablet TAKE 1 TABLET BY MOUTH 2 TIMES DAILY Yes Waldemar Benjamin MD   Ferrous Sulfate (IRON) 325 (65 Fe) MG TABS TAKE ONE TABLET BY MOUTH DAILY Yes Waldemar Benjamin MD   Biotin 1000 MCG TABS Take 1 tablet by mouth daily Yes Historical Provider, MD   allopurinol (ZYLOPRIM) 100 MG tablet Take 200 mg by mouth daily  Yes Historical Provider, MD   Misc Natural Products (OSTEO BI-FLEX TRIPLE STRENGTH PO) Take 2 capsules by mouth daily.  Yes Historical Provider, MD         Past Medical History:   Diagnosis Date    Arthritis     CTS (carpal tunnel syndrome)     Erectile dysfunction     GERD (gastroesophageal reflux disease)     Hyperglycemia     Hyperlipidemia     Hypertension     Kidney stone 09/2016    Malignant neoplasm of prostate ([address] 4/19/2021    AJ treated with BiPAP     Osteoarthritis     Trigger thumb     Vitamin D deficiency        Past Surgical History:   Procedure Laterality Date    APPENDECTOMY      BACK SURGERY      CARPAL TUNNEL RELEASE  11/12    right    COLONOSCOPY  08/14/2018    diverticulosis    CYSTOSCOPY Right 09/28/2016     RIGHT URETEROSCOPY , RIGHT STENT PLACEMENT    HEMORRHOID SURGERY      JOINT REPLACEMENT      right shoulder replacement    OTHER SURGICAL HISTORY  10/12/2016    CYSTOSCOPY, DIAGNOSTIC RIGHT URETEROSCOPY, RIGHT RETROGRADE    NM COLONOSCOPY FLX DX W/COLLJ SPEC WHEN PFRMD N/A 8/15/2018    COLONOSCOPY performed by Husam Felix MD at [address] Left 07/02/13    left total shoulder replacement         Family History   Problem Relation Age of Onset    Heart Disease Mother     High Blood Pressure Mother     Arthritis Mother     Heart Disease Father     Arthritis Father        CareTeam (Including outside providers/suppliers regularly involved in providing care):   Patient Care Team:  Lashanda Up MD as PCP - Ruth Zambrano MD as PCP - [address] [place] [address] Provider  Magdiel Medina MD as Consulting Physician (Pulmonology)  Taniya Osuna MD (Orthopedic Surgery)  KARLOS Mera CNP as Consulting Physician (Nurse Practitioner)  KARLOS Soliz CNP as Nurse Practitioner (Family Nurse Practitioner)  Daxa Lobo RN as Nurse Navigator    Wt Readings from Last 3 Encounters:   10/25/22 242 lb (109.8 kg)   10/17/22 240 lb (108.9 kg)   08/08/22 240 lb (108.9 kg)     Vitals:    10/25/22 0742   BP: 128/68   Pulse: 96   SpO2: 96%   Weight: 242 lb (109.8 kg)     Body mass index is 34.72 kg/m². Based upon direct observation of the patient, evaluation of cognition reveals recent and remote memory intact. Patient's complete Health Risk Assessment and screening values have been reviewed and are found in Flowsheets. The following problems were reviewed today and where indicated follow up appointments were made and/or referrals ordered. Positive Risk Factor Screenings with Interventions:     Fall Risk:  Do you feel unsteady or are you worried about falling? : (!) yes  2 or more falls in past year?: no  Fall with injury in past year?: no   Fall Risk Interventions:    Patient declines any further evaluation/treatment for this issue       Substance History:  Social History       Tobacco History       Smoking Status  Every Day Smoking Start Date  1/1/1989 Smoking Frequency  1 pack/day for 30.00 years (30.00 pk-yrs) Smoking Tobacco Type  Cigarettes since 1/1/1989      Smokeless Tobacco Use  Never              Alcohol History       Alcohol Use Status  Yes Drinks/Week  0 Standard drinks or equivalent per week Amount  0.0 standard drinks of alcohol/wk Comment  occasionally -2 x month              Drug Use       Drug Use Status  No              Sexual Activity       Sexually Active  Yes Partners  Female                   Alcohol Screening:       A score of 8 or more is associated with harmful or hazardous drinking. A score of 13 or more in women, and 15 or more in men, is likely to indicate alcohol dependence. Substance Abuse Interventions:  Tobacco abuse:  patient is not ready to work toward tobacco cessation at this time    General Health and ACP:  General  In general, how would you say your health is?: Fair  In the past 7 days, have you experienced any of the following: New or Increased Pain, New or Increased Fatigue, Loneliness, Social Isolation, Stress or Anger?: No  Do you get the social and emotional support that you need?: Yes  Do you have a Living Will?: (!) No    Advance Directives       Power of [address] Will ACP-Advance Directive ACP-Power of     Not on File Not on File Not on File Not on File        General Health Risk Interventions:  No Living Will: Patient declines ACP discussion/assistance    Health Habits/Nutrition:  Physical Activity: Inactive    Days of Exercise per Week: 0 days    Minutes of Exercise per Session: 0 min     Have you lost any weight without trying in the past 3 months?: No     Have you seen the dentist within the past year?: Yes  Health Habits/Nutrition Interventions:  NO interventions needed at this time. Hearing/Vision:  Do you or your family notice any trouble with your hearing that hasn't been managed with hearing aids?: (!) Yes  Do you have difficulty driving, watching TV, or doing any of your daily activities because of your eyesight?: No  Have you had an eye exam within the past year?: Yes  No results found.   Hearing/Vision Interventions:  Hearing concerns:  patient declines any further evaluation/treatment for hearing issues     ADLs:  In the past 7 days, did you need help from others to perform any of the following everyday activities: Eating, dressing, grooming, bathing, toileting, or walking/balance?: (!) Yes  Select all that apply: (!) Walking/Balance  In the past 7 days, did you need help from others to take care of any of the following: Laundry, housekeeping, banking/finances, shopping, telephone use, food preparation, transportation, or taking medications?: No  ADL Interventions:  Patient declines any further evaluation/treatment for this issue    Personalized Preventive Plan   Current Health Maintenance Status  Immunization History   Administered Date(s) Administered    COVID-19, MODERNA BLUE border, Primary or Immunocompromised, (age 12y+), IM, 100 mcg/0.5mL 02/20/2021, 03/20/2021, 11/13/2021    Influenza, FLUARIX, FLULAVAL, FLUZONE (age 10 mo+) AND AFLURIA, (age 1 y+), PF, 0.5mL 10/19/2020    Influenza, FLUCELVAX, (age 10 mo+), MDCK, PF, 0.5mL 10/19/2021    Tdap (Boostrix, Adacel) 11/17/2014        Health Maintenance   Topic Date Due    Pneumococcal 65+ years Vaccine (1 - PCV) Never done    Shingles vaccine (1 of 2) Never done    COVID-19 Vaccine (4 - Booster for Moderna series) 01/08/2022    Depression Screen  04/19/2022    Flu vaccine (1) 08/01/2022    Lipids  10/19/2022    Low dose CT lung screening  01/14/2023    Prostate Specific Antigen (PSA) Screening or Monitoring  09/10/2023    Annual Wellness Visit (AWV)  10/26/2023    DTaP/Tdap/Td vaccine (2 - Td or Tdap) 11/17/2024    Colorectal Cancer Screen  08/15/2028    AAA screen  Completed    Hepatitis C screen  Completed    Hepatitis A vaccine  Aged Out    Hib vaccine  Aged Out    Meningococcal (ACWY) vaccine  Aged Out     Recommendations for AMENDIA Due: see orders and patient instructions/AVS.  . Recommended screening schedule for the next 5-10 years is provided to the patient in written form: see Patient Instructions/AVS.    Paolo Aponte LPN, 79/42/8921, performed the documented evaluation under the direct supervision of the attending physician.

## 2022-10-25 NOTE — PROGRESS NOTES
Medicare Annual Wellness Visit    Debra Griffith is here for Hypertension and Medicare AWV    Assessment & Plan   Initial Medicare annual wellness visit  Intermittent claudication ([address]  Right hip pain  -     XR HIP RIGHT (2-3 VIEWS); Future  Right leg pain  -     XR KNEE RIGHT (3 VIEWS); Future  Essential hypertension, benign  -     CBC with Auto Differential  -     Basic Metabolic Panel  Mixed hyperlipidemia  -     LIPID PANEL  Vitamin D deficiency  -     Vitamin D 25 Hydroxy  Gastroesophageal reflux disease without esophagitis  Primary osteoarthritis involving multiple joints  Impingement syndrome of both shoulders    Recommendations for Preventive Services Due: see orders and patient instructions/AVS.  Recommended screening schedule for the next 5-10 years is provided to the patient in written form: see Patient Instructions/AVS.     No follow-ups on file. Subjective       Patient's complete Health Risk Assessment and screening values have been reviewed and are found in Flowsheets. The following problems were reviewed today and where indicated follow up appointments were made and/or referrals ordered. Positive Risk Factor Screenings with Interventions:       Tobacco Use:  Tobacco Use: High Risk    Smoking Tobacco Use: Every Day    Smokeless Tobacco Use: Never    Passive Exposure: Not on file     E-cigarette/Vaping       Questions Responses    E-cigarette/Vaping Use     Start Date     Passive Exposure     Quit Date     Counseling Given     Comments           Substance Use - Tobacco Interventions:  tobacco cessation tips and resources provided         Opioid Risk: (Low risk score <55) Opioid risk score: 16    Patient is low risk for opioid use disorder or overdose.   Last PDMP Sheila Veliz as Reviewed:  Review User Review Instant Review Result   Kassidy Hurd 9/26/2022  3:25 PM     Reviewed PDMP [1]        General Health and ACP:       Advance Directives       Power of  Living Will ACP-Advance Directive ACP-Power of     Not on File Not on File Not on File Not on File          General Health Risk Interventions:  Fatigue: patient declines any further evaluation/treatment for this issue    Health Habits/Nutrition:  Physical Activity: Not on file              Health Habits/Nutrition Interventions:  Diet stable for him             Objective   Vitals:    10/25/22 0742   BP: 128/68   Pulse: 96   SpO2: 96%   Weight: 242 lb (109.8 kg)      Body mass index is 34.72 kg/m². Allergies   Allergen Reactions    Suprax [Cefixime] Itching     Swollen hands and itching     Prior to Visit Medications    Medication Sig Taking? Authorizing Provider   simvastatin (ZOCOR) 20 MG tablet TAKE 1 TABLET AT BEDTIME Yes KARLOS Flores CNP   Omega-3 Fatty Acids (FISH OIL) 1000 MG capsule TAKE 6 CAPSULES TWICE DAILY Yes KARLOS Flores CNP   diclofenac (VOLTAREN) 75 MG EC tablet TAKE 1 TABLET BY MOUTH 2 TIMES DAILY Yes KARLOS Flores CNP   methylPREDNISolone (MEDROL DOSEPACK) 4 MG tablet Take by mouth. Yes Erwin Councilman, MD   oxyCODONE-acetaminophen (PERCOCET) 5-325 MG per tablet Take 1 tablet by mouth 2 times daily as needed for Pain for up to 30 days.  Yes Erwin Councilman, MD   omeprazole (PRILOSEC) 40 MG delayed release capsule Take 1 capsule by mouth every morning (before breakfast) Yes KARLOS Flores CNP   Vitamin D (CHOLECALCIFEROL) 25 MCG (1000 UT) TABS tablet TAKE 2 TABLETS BY MOUTH DAILY Yes Dorcus Cogan, MD   niacin (NIASPAN) 1000 MG extended release tablet TAKE ONE TABLET BY MOUTH NIGHTLY Yes Dorcus Cogan, MD   enalapril (VASOTEC) 20 MG tablet TAKE 1 TABLET BY MOUTH 2 TIMES DAILY Yes Dorcus Cogan, MD   Ferrous Sulfate (IRON) 325 (65 Fe) MG TABS TAKE ONE TABLET BY MOUTH DAILY Yes Dorcus Cogan, MD   Biotin 1000 MCG TABS Take 1 tablet by mouth daily  Yes Historical Provider, MD   allopurinol (ZYLOPRIM) 100 MG tablet Take 200 mg by mouth daily  Yes Historical Provider, MD   Misc Natural Products (OSTEO BI-FLEX TRIPLE STRENGTH PO) Take 2 capsules by mouth daily. Yes Historical Provider, MD Baer (Including outside providers/suppliers regularly involved in providing care):   Patient Care Team:  Manju Cardenas MD as PCP - Scooter Melo MD as PCP - [facility] Empaneled Provider  Ricky Dumont MD as Consulting Physician (Pulmonology)  Ivon Truong MD (Orthopedic Surgery)  KARLOS Hayes CNP as Consulting Physician (Nurse Practitioner)  KARLOS Sanford CNP as Nurse Practitioner (Family Nurse Practitioner)  Agustin Myrick RN as Nurse Navigator     Reviewed and updated this visit:  Tobacco  Allergies  Meds  Problems  Med Hx  Surg Hx  Soc Hx  Fam Hx              Joo Anderson LPN, 61/90/6867, performed the documented evaluation under the direct supervision of the attending physician. This encounter was performed under my, Stephen Mandujano, direct supervision, 10/25/2022.

## 2022-10-25 NOTE — PATIENT INSTRUCTIONS
Patient should call the office immediately with new or ongoing signs or symptoms or worsening, or proceed to the emergency room. If you are on medications which could impair your senses, you are at risk of weakness, falls, dizziness, or drowsiness. You should be careful during activities which could place you at risk of harm, such as climbing, using stairs, operating machinery, or driving vehicles. If you feel you cannot safely do these activities, you should request others to help you, or avoid the activities altogether. If you are drowsy for any other reason, you should use the same precautions as listed above. Web Address for Advance Directive:    [employer]     Personalized Preventive Plan for Negra Stone - 10/25/2022  Medicare offers a range of preventive health benefits. Some of the tests and screenings are paid in full while other may be subject to a deductible, co-insurance, and/or copay. Some of these benefits include a comprehensive review of your medical history including lifestyle, illnesses that may run in your family, and various assessments and screenings as appropriate. After reviewing your medical record and screening and assessments performed today your provider may have ordered immunizations, labs, imaging, and/or referrals for you. A list of these orders (if applicable) as well as your Preventive Care list are included within your After Visit Summary for your review. Other Preventive Recommendations:    A preventive eye exam performed by an eye specialist is recommended every 1-2 years to screen for glaucoma; cataracts, macular degeneration, and other eye disorders. A preventive dental visit is recommended every 6 months. Try to get at least 150 minutes of exercise per week or 10,000 steps per day on a pedometer . Order or download the FREE \"Exercise & Physical Activity: Your Everyday Guide\" from [employer]. Call 2-538.694.1589 or search [employer] online. You need 6235-6977 mg of calcium and 9941-6451 IU of vitamin D per day. It is possible to meet your calcium requirement with diet alone, but a vitamin D supplement is usually necessary to meet this goal.  When exposed to the sun, use a sunscreen that protects against both UVA and UVB radiation with an SPF of 30 or greater. Reapply every 2 to 3 hours or after sweating, drying off with a towel, or swimming. Always wear a seat belt when traveling in a car. Always wear a helmet when riding a bicycle or motorcycle.

## 2022-10-25 NOTE — PROGRESS NOTES
Chief Complaint   Patient presents with    Hypertension    Medicare AWV        Internal Administration   First Dose COVID-19, LINO cummings, Primary or Immunocompromised, (age 12y+), IM, 100 mcg/0.5mL  2021   Second Dose COVID-19, MODERNA BLUE border, Primary or Immunocompromised, (age 12y+), IM, 100 mcg/0.5mL   2021       Last COVID Lab No results found for: SARS-COV-2, SARS-COV-2 RNA, SARS-COV-2, SARS-COV-2, SARS-COV-2 BY PCR, SARS-COV-2, SARS-COV-2, SARS-COV-2          Wt Readings from Last 3 Encounters:   10/25/22 242 lb (109.8 kg)   10/17/22 240 lb (108.9 kg)   22 240 lb (108.9 kg)     BP Readings from Last 3 Encounters:   10/25/22 128/68   22 100/63   22 132/74      Lab Results   Component Value Date    LABA1C 5.6 2018    LABA1C 5.9 2018       HPI:  Foster Martínez is a 78 y.o. (: 1955) here today for    Patient continues to have worsening pain of the right leg. He continues to see pain management and they did a MRI of his back which does show pinching of the spinal nerves and explained that likely the pain is from this because his EMG and testing prior did not show anything. Upon talking further with patient he states it starts at his right hip and the pain shoots down his leg. Will get a right hip xray. Patient did go to see dr. Tamiko Aldana for the bulging eye sensation and he states that they really did not see anything that really showed any problems. Explained he does have a lot of sinus pressure so felt it might have been caused by the sinuses. [] Patient has completed an advance directive  [x] Patient has NOT completed an advanced directive  [] Patient has a documented healthcare surrogate  [x] Patient does NOT have a documented healthcare surrogate  [] Discussed the importance of establishing and updating an advanced directive. Patient has questions at this time and those were answered.   [x] Discussed the importance of establishing and updating an advanced directive. Patient does NOT have questions at this time. Discussed with: [x] Patient            [] Family             [] Other caregiver    Patient's medications, allergies, past medical, surgical, social and family histories were reviewed and updated asappropriate on 10/25/2022 at 7:49 AM.    ROS:  Review of Systems    All other systems reviewed and are negative except as noted above on 10/25/2022 at 7:49 AM. Additional review of systems may be scanned into the media section ofthis medical record. Any responses requiring further intervention were pursued. Past Medical History:   Diagnosis Date    Arthritis     CTS (carpal tunnel syndrome)     Erectile dysfunction     GERD (gastroesophageal reflux disease)     Hyperglycemia     Hyperlipidemia     Hypertension     Kidney stone 09/2016    Malignant neoplasm of prostate ([facility] [address] 4/19/2021    AJ treated with BiPAP     Osteoarthritis     Trigger thumb     Vitamin D deficiency      Family History   Problem Relation Age of Onset    Heart Disease Mother     High Blood Pressure Mother     Arthritis Mother     Heart Disease Father     Arthritis Father      Social History     Socioeconomic History    Marital status:      Spouse name: Not on file    Number of children: Not on file    Years of education: Not on file    Highest education level: Not on file   Occupational History    Not on file   Tobacco Use    Smoking status: Every Day     Packs/day: 1.00     Years: 30.00     Pack years: 30.00     Types: Cigarettes     Start date: 1/1/1989    Smokeless tobacco: Never   Substance and Sexual Activity    Alcohol use:  Yes     Alcohol/week: 0.0 standard drinks     Comment: occasionally -2 x month    Drug use: No    Sexual activity: Yes     Partners: Female   Other Topics Concern    Not on file   Social History Narrative    Not on file     Social Determinants of Health     Financial Resource Strain: Low Risk     Difficulty of Paying Living Expenses: Not hard at all   Food Insecurity: No Food Insecurity    Worried About Running Out of Food in the Last Year: Never true    Ran Out of Food in the Last Year: Never true   Transportation Needs: Not on file   Physical Activity: Not on file   Stress: Not on file   Social Connections: Not on file   Intimate Partner Violence: Not on file   Housing Stability: Not on file     Prior to Visit Medications    Medication Sig Taking? Authorizing Provider   simvastatin (ZOCOR) 20 MG tablet TAKE 1 TABLET AT BEDTIME Yes Iraj Lynne, APRN - CNP   Omega-3 Fatty Acids (FISH OIL) 1000 MG capsule TAKE 6 CAPSULES TWICE DAILY Yes Iraj Lynne, APRN - CNP   diclofenac (VOLTAREN) 75 MG EC tablet TAKE 1 TABLET BY MOUTH 2 TIMES DAILY Yes Iraj Lynne, APRN - CHRISTINA   methylPREDNISolone (MEDROL DOSEPACK) 4 MG tablet Take by mouth. Yes Aubrey Solares MD   oxyCODONE-acetaminophen (PERCOCET) 5-325 MG per tablet Take 1 tablet by mouth 2 times daily as needed for Pain for up to 30 days. Yes Aubrey Solares MD   omeprazole (PRILOSEC) 40 MG delayed release capsule Take 1 capsule by mouth every morning (before breakfast) Yes Iraj Lynne, APRN - CNP   Vitamin D (CHOLECALCIFEROL) 25 MCG (1000 UT) TABS tablet TAKE 2 TABLETS BY MOUTH DAILY Yes Memo Sesay MD   niacin (NIASPAN) 1000 MG extended release tablet TAKE ONE TABLET BY MOUTH NIGHTLY Yes Memo Sesay MD   enalapril (VASOTEC) 20 MG tablet TAKE 1 TABLET BY MOUTH 2 TIMES DAILY Yes Memo Sesay MD   Ferrous Sulfate (IRON) 325 (65 Fe) MG TABS TAKE ONE TABLET BY MOUTH DAILY Yes Memo Sesay MD   Biotin 1000 MCG TABS Take 1 tablet by mouth daily  Yes Historical Provider, MD   allopurinol (ZYLOPRIM) 100 MG tablet Take 200 mg by mouth daily  Yes Historical Provider, MD   Misc Natural Products (OSTEO BI-FLEX TRIPLE STRENGTH PO) Take 2 capsules by mouth daily.  Yes Historical Provider, MD     Allergies   Allergen Reactions Suprax [Cefixime] Itching     Swollen hands and itching       OBJECTIVE:  Estimated body mass index is 34.72 kg/m² as calculated from the following:    Height as of 10/17/22: 5' 10\" (1.778 m). Weight as of this encounter: 242 lb (109.8 kg). Vitals:    10/25/22 0742   BP: 128/68   Pulse: 96   SpO2: 96%   Weight: 242 lb (109.8 kg)       Physical Exam  Vitals and nursing note reviewed. Constitutional:       General: He is not in acute distress. Appearance: He is well-developed. He is not diaphoretic. HENT:      Head: Normocephalic and atraumatic. Right Ear: External ear normal.      Left Ear: External ear normal.      Nose: Nose normal.   Eyes:      General: Lids are normal. No scleral icterus. Right eye: No discharge. Left eye: No discharge. Pupils: Pupils are equal, round, and reactive to light. Neck:      Thyroid: No thyromegaly. Vascular: No JVD. Cardiovascular:      Rate and Rhythm: Normal rate and regular rhythm. Heart sounds: Normal heart sounds. Pulmonary:      Effort: Pulmonary effort is normal. No respiratory distress. Breath sounds: Normal breath sounds. Abdominal:      Palpations: Abdomen is soft. There is no hepatomegaly or splenomegaly. Tenderness: There is no abdominal tenderness. Musculoskeletal:      Right shoulder: Decreased range of motion. Left shoulder: Decreased range of motion. Right lower leg: No edema. Left lower leg: No edema. Comments: Bilateral shoulder impingement, decreased range of motion of bilateral shoulders 70 degrees abduction bilateral, lacks 35 degrees of external rotation in the right shoulder and 45 degrees of internal rotation right shoulder     Skin:     General: Skin is warm and dry. Coloration: Skin is not pale. Findings: No erythema or rash. Comments: Turgor normal   Neurological:      Mental Status: He is oriented to person, place, and time.    Psychiatric:         Mood and Affect: Mood normal.         Behavior: Behavior normal.         Thought Content: Thought content normal.         Judgment: Judgment normal.            ASSESSMENT PLAN      Diagnosis Orders   1. Initial Medicare annual wellness visit        2. Intermittent claudication ([address]        3. Right hip pain  XR HIP RIGHT (2-3 VIEWS)      4. Right leg pain  XR KNEE RIGHT (3 VIEWS)      5. Essential hypertension, benign  CBC with Auto Differential    Basic Metabolic Panel      6. Mixed hyperlipidemia  LIPID PANEL      7. Vitamin D deficiency  Vitamin D 25 Hydroxy      8. Gastroesophageal reflux disease without esophagitis        9. Primary osteoarthritis involving multiple joints        10. Impingement syndrome of both shoulders        In addition to the AWV patient was complaining of right shoulder pain. Both shoulders have been replaced in his 46s. He still has significant impingement and decreased motion. Describing the sharp pain is like a nail being driven in the shoulder. Discussion about stem cell injections in the cervical spine reviewed MRI of the cervical spine. I told him that that may help some of the discomfort as he likely has radicular pain that would not return shoulder function to normal.  Not sure if he will follow-up on this in the future. Still having significant right leg pain, EMG showed bilateral neuropathy versus radiculopathy at L5 and S1. MRI of the lumbar spine revealed multilevel degenerative disease with stenosis most severe at L3-L4. However he states that the stem cell injection in the lumbar spine helped significantly. He relates his pain is running between his right hip and right knee and throughout the thigh. I suspect that there are orthopedic issues. His MICHELLE was unremarkable earlier this year. Check right knee x-ray and right hip x-ray.   Current blood pressure readings in the office or at home are acceptable and current antihypertensive medications as listed in the medication list require no change. Lipids will be monitored based upon levels requiring treatment and other cardiac risks. Medications for hyperlipidemia and hypertriglyceridemia as listed on the medication list will be changed as necessary to reach control parameters. Vitamin D levels will be monitored as needed and changes to medications related to vitamin D as listed in the medication list will be changed to maintain parameters as needed. Reflux symptoms based upon patient's history is controlled and no changes in medications for reflux as listed in the medication profile is necessary. May need return to orthopedics in the future. He also is having an upcoming surgery for abdominal hernia, will be returning for preop physical.    Patient should call the office immediately with new or ongoing signs or symptoms or worsening, or proceed to the emergency room. No changes in past medical history, past surgical history, social history, or family history were noted during the patient encounter unless specifically listed above. All updates of past medical history, past surgical history, social history, or family history were reviewed personally by me during the office visit. All problems listed in the assessment are stable unless noted otherwise. Medication profile reviewed personally by me during the visit. Medication side effects and possible impairments from medications were discussed as applicable. This document was prepared by a combination of typing and transcription through a voice recognition software. Scribe attestation: Yessenia Kumar RN, am scribing for and in the presence of Rafiq Park MD. Electronically signed by Chad Hong RN on 10/25/2022 at 7:49 AM      Provider attestation:     I, Dr. Jacque Pineda, personally performed the services described in this documentation, as scribed by the above signed scribe in my presence, and it is both accurate and complete.  I agree with the ROS and Past Histories independently gathered by the clinical support staff and the remaining scribed note accurately describes my personal service to the patient.       10/25/2022    8:18 AM

## 2022-10-26 ENCOUNTER — HOSPITAL ENCOUNTER (OUTPATIENT)
Age: 67
Discharge: HOME | End: 2022-10-26
Payer: MEDICARE

## 2022-10-26 ENCOUNTER — HOSPITAL ENCOUNTER (OUTPATIENT)
Dept: GENERAL RADIOLOGY | Age: 67
Discharge: HOME | End: 2022-10-26
Payer: MEDICARE

## 2022-10-26 DIAGNOSIS — R79.89 ABNORMAL CBC: Primary | ICD-10-CM

## 2022-10-26 DIAGNOSIS — R79.89 ABNORMAL CBC: ICD-10-CM

## 2022-10-26 DIAGNOSIS — M79.604 RIGHT LEG PAIN: ICD-10-CM

## 2022-10-26 DIAGNOSIS — M25.551 RIGHT HIP PAIN: ICD-10-CM

## 2022-10-26 LAB
FERRITIN: 419.3 NG/ML (ref 30–400)
IRON SATURATION: 29 % (ref 20–50)
IRON: 65 UG/DL (ref 59–158)
TOTAL IRON BINDING CAPACITY: 225 UG/DL (ref 260–445)

## 2022-10-26 PROCEDURE — 73502 X-RAY EXAM HIP UNI 2-3 VIEWS: CPT

## 2022-10-26 PROCEDURE — 73560 X-RAY EXAM OF KNEE 1 OR 2: CPT

## 2022-10-26 RX ORDER — DICLOFENAC SODIUM 75 MG/1
75 TABLET, DELAYED RELEASE ORAL 2 TIMES DAILY
Qty: 60 TABLET | Refills: 0 | Status: SHIPPED | OUTPATIENT
Start: 2022-10-26 | End: 2022-11-27

## 2022-11-01 RX ORDER — SIMVASTATIN 20 MG
TABLET ORAL
Qty: 30 TABLET | Refills: 0 | Status: SHIPPED | OUTPATIENT
Start: 2022-11-01

## 2022-11-07 SDOH — HEALTH STABILITY: PHYSICAL HEALTH: ON AVERAGE, HOW MANY MINUTES DO YOU ENGAGE IN EXERCISE AT THIS LEVEL?: 10 MIN

## 2022-11-07 SDOH — HEALTH STABILITY: PHYSICAL HEALTH: ON AVERAGE, HOW MANY DAYS PER WEEK DO YOU ENGAGE IN MODERATE TO STRENUOUS EXERCISE (LIKE A BRISK WALK)?: 1 DAY

## 2022-11-07 ASSESSMENT — SOCIAL DETERMINANTS OF HEALTH (SDOH)
WITHIN THE LAST YEAR, HAVE YOU BEEN HUMILIATED OR EMOTIONALLY ABUSED IN OTHER WAYS BY YOUR PARTNER OR EX-PARTNER?: NO
WITHIN THE LAST YEAR, HAVE YOU BEEN AFRAID OF YOUR PARTNER OR EX-PARTNER?: NO
WITHIN THE LAST YEAR, HAVE TO BEEN RAPED OR FORCED TO HAVE ANY KIND OF SEXUAL ACTIVITY BY YOUR PARTNER OR EX-PARTNER?: NO
WITHIN THE LAST YEAR, HAVE YOU BEEN KICKED, HIT, SLAPPED, OR OTHERWISE PHYSICALLY HURT BY YOUR PARTNER OR EX-PARTNER?: NO

## 2022-11-10 ENCOUNTER — OFFICE VISIT (OUTPATIENT)
Dept: ORTHOPEDIC SURGERY | Age: 67
End: 2022-11-10

## 2022-11-10 VITALS — BODY MASS INDEX: 34.65 KG/M2 | WEIGHT: 242 LBS | HEIGHT: 70 IN

## 2022-11-10 DIAGNOSIS — M17.11 PRIMARY OSTEOARTHRITIS OF RIGHT KNEE: ICD-10-CM

## 2022-11-10 DIAGNOSIS — R52 PAIN: Primary | ICD-10-CM

## 2022-11-10 RX ORDER — BUPIVACAINE HYDROCHLORIDE 2.5 MG/ML
30 INJECTION, SOLUTION INFILTRATION; PERINEURAL ONCE
Status: COMPLETED | OUTPATIENT
Start: 2022-11-10 | End: 2022-11-10

## 2022-11-10 RX ORDER — LIDOCAINE HYDROCHLORIDE 10 MG/ML
20 INJECTION, SOLUTION INFILTRATION; PERINEURAL ONCE
Status: COMPLETED | OUTPATIENT
Start: 2022-11-10 | End: 2022-11-10

## 2022-11-10 RX ADMIN — LIDOCAINE HYDROCHLORIDE 20 ML: 10 INJECTION, SOLUTION INFILTRATION; PERINEURAL at 10:33

## 2022-11-10 RX ADMIN — BUPIVACAINE HYDROCHLORIDE 75 MG: 2.5 INJECTION, SOLUTION INFILTRATION; PERINEURAL at 10:33

## 2022-11-10 NOTE — PROGRESS NOTES
Dr Veronica Anglin      Date /Time 11/10/2022       11:41 AM EST  Name Shelley Aldana             1955   Location  [facility]  MRN [id number]                Chief Complaint   Patient presents with    Knee Pain     RIGHT KNEE    Hip Pain     RIGHT HIP         History of Present Illness    Shelley Aldana is a 78 y.o. male who presents with  right hip and knee pain. Sent in consultation by Unice Dakin, MD.      Injury Mechanism:  none. Worker's Comp. & legal issues:   none. Previous Treatments: Ice, Heat, and NSAIDs    Patient presents to the office today for a new problem. Patient here with a chief complaint of right hip greater than right knee pain. Symptoms have been present for several months without injury or trauma. Increased pain with activities and improvement with rest.  He is scheduled for a abdominal hernia repair next month with a general surgeon. Pain is concentrated over his groin and also over his lateral knee.     Past History  Past Medical History:   Diagnosis Date    Arthritis     CTS (carpal tunnel syndrome)     Erectile dysfunction     GERD (gastroesophageal reflux disease)     Hyperglycemia     Hyperlipidemia     Hypertension     Kidney stone 09/2016    Malignant neoplasm of prostate ([address] 4/19/2021    AJ treated with BiPAP     Osteoarthritis     Trigger thumb     Vitamin D deficiency      Past Surgical History:   Procedure Laterality Date    APPENDECTOMY      BACK SURGERY      CARPAL TUNNEL RELEASE  11/12    right    COLONOSCOPY  08/14/2018    diverticulosis    CYSTOSCOPY Right 09/28/2016     RIGHT URETEROSCOPY , RIGHT STENT PLACEMENT    HEMORRHOID SURGERY      JOINT REPLACEMENT      right shoulder replacement    OTHER SURGICAL HISTORY  10/12/2016    CYSTOSCOPY, DIAGNOSTIC RIGHT URETEROSCOPY, RIGHT RETROGRADE    [place] COLONOSCOPY FLX DX W/COLLJ SPEC WHEN PFRMD N/A 8/15/2018    COLONOSCOPY performed by Dennis Squires MD at [address] [place] [address] PROSTATECTOMY      SHOULDER ARTHROPLASTY Left 07/02/13    left total shoulder replacement     Family History   Problem Relation Age of Onset    Heart Disease Mother     High Blood Pressure Mother     Arthritis Mother     Heart Disease Father     Arthritis Father      Social History     Tobacco Use    Smoking status: Every Day     Packs/day: 1.00     Years: 30.00     Pack years: 30.00     Types: Cigarettes     Start date: 1/1/1989    Smokeless tobacco: Never   Substance Use Topics    Alcohol use: Yes     Alcohol/week: 0.0 standard drinks     Comment: occasionally -2 x month      Current Outpatient Medications on File Prior to Visit   Medication Sig Dispense Refill    simvastatin (ZOCOR) 20 MG tablet TAKE 1 TABLET AT BEDTIME 30 tablet 0    diclofenac (VOLTAREN) 75 MG EC tablet TAKE 1 TABLET BY MOUTH 2 TIMES DAILY 60 tablet 0    Omega-3 Fatty Acids (FISH OIL) 1000 MG capsule TAKE 6 CAPSULES TWICE DAILY 300 capsule 0    methylPREDNISolone (MEDROL DOSEPACK) 4 MG tablet Take by mouth. 1 kit 0    omeprazole (PRILOSEC) 40 MG delayed release capsule Take 1 capsule by mouth every morning (before breakfast) 90 capsule 0    Vitamin D (CHOLECALCIFEROL) 25 MCG (1000 UT) TABS tablet TAKE 2 TABLETS BY MOUTH DAILY 200 tablet 0    niacin (NIASPAN) 1000 MG extended release tablet TAKE ONE TABLET BY MOUTH NIGHTLY 90 tablet 0    enalapril (VASOTEC) 20 MG tablet TAKE 1 TABLET BY MOUTH 2 TIMES DAILY 60 tablet 5    Ferrous Sulfate (IRON) 325 (65 Fe) MG TABS TAKE ONE TABLET BY MOUTH DAILY 100 tablet 5    Biotin 1000 MCG TABS Take 1 tablet by mouth daily       allopurinol (ZYLOPRIM) 100 MG tablet Take 200 mg by mouth daily       Misc Natural Products (OSTEO BI-FLEX TRIPLE STRENGTH PO) Take 2 capsules by mouth daily. No current facility-administered medications on file prior to visit. ASCVD 10-YEAR RISK SCORE  The ASCVD Risk score (Willi DK, et al., 2019) failed to calculate for the following reasons:     The valid total cholesterol range is 130 to 320 mg/dL   . Review of Systems  10-point ROS is negative other than HPI. Physical Exam  Based off 1997 Exam Criteria  Ht 5' 10\" (1.778 m)   Wt 242 lb (109.8 kg)   BMI 34.72 kg/m²      Constitutional:       General: He is not in acute distress. Appearance: Normal appearance. Cardiovascular:      Rate and Rhythm: Normal rate and regular rhythm. Pulses: Normal pulses. Pulmonary:      Effort: Pulmonary effort is normal. No respiratory distress. Neurological:      Mental Status: He is alert and oriented to person, place, and time. Mental status is at baseline. Musculoskeletal:  Gait:  altered    Skin: Clean and intact.   No open sores or wounds    Lymphatics: No palpable lymph nodes    Spine / Hip Exam:      RIGHT  LEFT    Lumbar Spine Exam  [x] All Neg    [x] All Neg     Straight leg raise  []  []Not tested   []  []Not tested    Clonus  []  []Not tested   []  []Not tested    Pain with motion  []  []Not tested   []  []Not tested    Radiculopathy  []  []Not tested   []  []Not tested    Paraspinal muscle tenderness  [] Paraspinal  []Midline   [] Paraspinal  []Midline   Sensation RIGHT  LEFT    L3  [x] Normal []Decreased    [x] Normal []Decreased   L4  [x] Normal  []Decreased   [x] Normal []Decreased   L5  [x] Normal []Decreased   [x] Normal []Decreased   S1  [x] Normal  []Decreased   [x] Normal []Decreased   Pelvis       Scoliosis  [x] Nml  [] Present     Leg-length discrepency  [x] Equal  [] Right longer   [] Left longer   Range of Motion Active Passive Active Passive   Hip Flexion 100  120    Abduction 30  50    External Rotation @ 90 flex 25  65    Internal Rotation @ 90 flex 0  20           Hip Impingement / Dysplasia  [] All Neg  [] Not tested   [x] All Neg  [] Not tested    Hip impingement test  [x]  []Not tested   []  []Not tested    C-sign  [x]  []Not tested   []  []Not tested    Anterior instability apprehension  []  []Not tested   []  []Not tested    Posterior instability apprehension  []  []Not tested   []  []Not tested    Uncontained Internal rotation  []  []Not tested  []  []Not tested          Abductors  [x] All Neg  [] Not tested   [x] All Neg  [] Not tested    Medius strength  []  []Not tested   []  []Not tested    Minimum strength  []  []Not tested   []  []Not tested    IT band tendonitis  []  []Not tested   []  []Not tested    Trochanteric tenderness  []  []Not tested  []  []Not tested   Sciatic neuropathic pain  []  []Not tested   []  []Not tested           Post-arthroplasty  [] All Neg  [] Not tested   [] All Neg  [] Not tested    Rectus tendonitis  []  []Not tested   []  []Not tested    Iliopsoas tendonitis       Start-up pain  []  []Not tested   []  []Not tested      Physical exam right knee: Physical exam of the knee demonstrates painful range of motion 5-110. Tenderness over the medial joint line. No gross instability to either varus or valgus stress test.    Imaging    Right Hip: [address] [facility]  Radiographs: X-rays were ordered today and reviewed of the right hip.  3 views. AP pelvis, lateral, and false profile. They demonstrate advanced bone-on-bone osteoarthritis right hip with complete collapse of the joint surface. X-rays were also ordered today and reviewed of the right knee. 4 views. Standing AP, standing AP flexed, lateral, and skyline views. They demonstrate moderate lateral joint space thinning with osteophyte formation. No evidence of fractures or dislocations.       Procedure:  Orders Placed This Encounter   Procedures    XR KNEE RIGHT (MIN 4 VIEWS)     Standing Status:   Future     Number of Occurrences:   1     Standing Expiration Date:   11/10/2023    XR HIP RIGHT (2-3 VIEWS)     Standing Status:   Future     Number of Occurrences:   1     Standing Expiration Date:   11/10/2023    US ARTHR/ASP/INJ MAJOR JNT/BURSA RIGHT     Standing Status:   Future     Number of Occurrences:   1     Standing Expiration Date:   11/10/2023 I discussed in detail the risks, benefits and complications of aninjection which included but are not limited to infection, skin reactions, hot swollen joint, and anaphylaxis with the patient. The patient verbalized understanding and gave informed consent for the right knee injection. The patient is placed supine on table with a bolster underneath knee. Knee prepped with Betadine/Sterile alcohol solution. A sterile 22-gauge needle was inserted into the knee and the mixture of 2ml knealog 40mg/cc, 4 mL of 1% plain lidocaine, and 4 cc .25% bupivacaine was injected under sterile technique. The needle was withdrawn and the puncture site sealed with a Band-Aid. Technique: Under sterile conditions a [id number] ultrasound unit with a variable frequency (6.0-15.0 MHz) linear transducer was used to localize the placement of a 22-gauge needle into the knee joint. Findings: Successful needle placement for knee injection. Final images were taken and saved for permanent record. The patient tolerated the injection well. The patient was instructed to call the office immediately if there is any pain, redness, warmth, fever, or chills. Assessment and Plan  Amado Coto was seen today for knee pain and hip pain. Diagnoses and all orders for this visit:    Pain  -     XR KNEE RIGHT (MIN 4 VIEWS); Future  -     XR HIP RIGHT (2-3 VIEWS); Future    Primary osteoarthritis of right knee  -     US ARTHR/ASP/INJ MAJOR JNT/BURSA RIGHT; Future    Other orders  -     bupivacaine (MARCAINE) 0.25 % injection 75 mg  -     lidocaine 1 % injection 20 mL  -     triamcinolone acetonide (KENALOG) injection 10 mg        Patient is suffering with right knee osteoarthritis of moderate severity and advanced right hip osteoarthritis. We will perform a right knee intra-articular cortisone injection today. We have recommended a right total hip arthroplasty but patient is a current everyday smoker.   He will need to be nicotine free for at least 6 weeks before and 6 weeks after surgery. I have referred him back to his primary care physician to discuss smoking cessation. We will see the patient back and 3 months or sooner if problems arise. I discussed with Jon Petty that his history, symptoms, signs, and imaging are most consistent with hip arthritis and knee arthritis    We reviewed the natural history of these conditions and treatment options ranging from conservative measures (rest, icing, activity modification, physical therapy, pain meds) to surgical options. In terms of treatment, I recommended continuing with rest, icing, avoidance of painful activities, NSAIDs or pain meds as tolerated, and physical therapy. We discussed surgical options as well, should conservative measures fail. Electronically signed by Ara Peabody, MD on 11/10/2022 at 11:41 AM  This dictation was generated by voice recognition computer software. Although all attempts are made to edit the dictation for accuracy, there may be errors in the transcription that are not intended.

## 2022-11-14 RX ORDER — NIACIN 1000 MG/1
TABLET, EXTENDED RELEASE ORAL
Qty: 90 TABLET | Refills: 0 | Status: SHIPPED | OUTPATIENT
Start: 2022-11-14

## 2022-11-17 ENCOUNTER — TELEPHONE (OUTPATIENT)
Dept: ORTHOPEDIC SURGERY | Age: 67
End: 2022-11-17

## 2022-11-17 NOTE — TELEPHONE ENCOUNTER
Surgery and/or Procedure Scheduling     Contact Name: Tramaine Pressley Request: [facility] [address]   Patient Contact Number: [phone]     Pt HAS CONSIDERED AND IS READY TO TALK ABOUT SETTING UP HIS SX. PLEASE CALL THE # ABOVE TO DISCUSS.

## 2022-11-27 RX ORDER — VITAMIN B COMPLEX
TABLET ORAL
Qty: 200 TABLET | Refills: 0 | Status: SHIPPED | OUTPATIENT
Start: 2022-11-27

## 2022-11-27 RX ORDER — DICLOFENAC SODIUM 75 MG/1
75 TABLET, DELAYED RELEASE ORAL 2 TIMES DAILY
Qty: 60 TABLET | Refills: 0 | Status: SHIPPED | OUTPATIENT
Start: 2022-11-27

## 2022-11-27 RX ORDER — CHLORAL HYDRATE 500 MG
CAPSULE ORAL
Qty: 300 CAPSULE | Refills: 0 | Status: SHIPPED | OUTPATIENT
Start: 2022-11-27

## 2022-11-27 RX ORDER — OMEPRAZOLE 40 MG/1
40 CAPSULE, DELAYED RELEASE ORAL
Qty: 90 CAPSULE | Refills: 0 | Status: SHIPPED | OUTPATIENT
Start: 2022-11-27

## 2022-11-27 RX ORDER — ENALAPRIL MALEATE 20 MG/1
TABLET ORAL
Qty: 60 TABLET | Refills: 0 | Status: SHIPPED | OUTPATIENT
Start: 2022-11-27

## 2022-11-28 ENCOUNTER — OFFICE VISIT (OUTPATIENT)
Dept: FAMILY MEDICINE CLINIC | Age: 67
End: 2022-11-28
Payer: MEDICARE

## 2022-11-28 VITALS
HEIGHT: 70 IN | HEART RATE: 89 BPM | SYSTOLIC BLOOD PRESSURE: 132 MMHG | WEIGHT: 239 LBS | OXYGEN SATURATION: 96 % | DIASTOLIC BLOOD PRESSURE: 66 MMHG | BODY MASS INDEX: 34.22 KG/M2

## 2022-11-28 DIAGNOSIS — L02.91 ABSCESS: Primary | ICD-10-CM

## 2022-11-28 PROCEDURE — 3017F COLORECTAL CA SCREEN DOC REV: CPT | Performed by: NURSE PRACTITIONER

## 2022-11-28 PROCEDURE — 4004F PT TOBACCO SCREEN RCVD TLK: CPT | Performed by: NURSE PRACTITIONER

## 2022-11-28 PROCEDURE — 99213 OFFICE O/P EST LOW 20 MIN: CPT | Performed by: NURSE PRACTITIONER

## 2022-11-28 PROCEDURE — 10060 I&D ABSCESS SIMPLE/SINGLE: CPT | Performed by: NURSE PRACTITIONER

## 2022-11-28 PROCEDURE — 1123F ACP DISCUSS/DSCN MKR DOCD: CPT | Performed by: NURSE PRACTITIONER

## 2022-11-28 PROCEDURE — 3078F DIAST BP <80 MM HG: CPT | Performed by: NURSE PRACTITIONER

## 2022-11-28 PROCEDURE — G8484 FLU IMMUNIZE NO ADMIN: HCPCS | Performed by: NURSE PRACTITIONER

## 2022-11-28 PROCEDURE — G8427 DOCREV CUR MEDS BY ELIG CLIN: HCPCS | Performed by: NURSE PRACTITIONER

## 2022-11-28 PROCEDURE — 3074F SYST BP LT 130 MM HG: CPT | Performed by: NURSE PRACTITIONER

## 2022-11-28 PROCEDURE — G8417 CALC BMI ABV UP PARAM F/U: HCPCS | Performed by: NURSE PRACTITIONER

## 2022-11-28 RX ORDER — DOXYCYCLINE HYCLATE 100 MG
100 TABLET ORAL 2 TIMES DAILY
Qty: 20 TABLET | Refills: 0 | Status: SHIPPED | OUTPATIENT
Start: 2022-11-28 | End: 2022-12-08

## 2022-11-28 ASSESSMENT — ENCOUNTER SYMPTOMS
RESPIRATORY NEGATIVE: 1
COLOR CHANGE: 0

## 2022-11-28 NOTE — PROGRESS NOTES
[facility]YSIC[facility]ACT[facility]  [facility]  [address] [place] [address]  [place] [address] [id number]  Dept: [phone]  Dept Fax: [phone]  Loc: [phone]    Олег Escobar is a 78 y.o. male who presents today for his medical conditions/complaints as noted below. Олег Escobar is c/o of Other (Pt has had an ingrown hair for some time and it is not so irritated it is very tender to the touch. )        HPI:     Chief Complaint   Patient presents with    Other     Pt has had an ingrown hair for some time and it is not so irritated it is very tender to the touch. HPI    Bridgette Aldana presents to the office today to discuss a sore on his left side of his abdomen where his pants rub. He admits to the area looking like an infected pimple. He admits to the area being tender. He admits to having a \"black head\" in that area for several years, but admits that he has been working on weight loss for the last several months and he has been having to make his belt extra tight to \"keep my pants up. \" He admits to his belt rubbing on his side and causing him irritation. He noticed over the last week that the area has now became swollen and more tender. He is not sure if he has any drainage as he states he cannot see the area. He has been placing a bandage in the area. He denies any fever or chills. He is not sure if the area is red. He denies any nausea or lack of appetite.      Past Medical History:   Diagnosis Date    Arthritis     CTS (carpal tunnel syndrome)     Erectile dysfunction     GERD (gastroesophageal reflux disease)     Hyperglycemia     Hyperlipidemia     Hypertension     Kidney stone 09/2016    Malignant neoplasm of prostate ([facility] [address] 4/19/2021    AJ treated with BiPAP     Osteoarthritis     Trigger thumb     Vitamin D deficiency       Past Surgical History:   Procedure Laterality Date    APPENDECTOMY      BACK SURGERY      CARPAL TUNNEL RELEASE  11/12    right    COLONOSCOPY  08/14/2018    diverticulosis CYSTOSCOPY Right 09/28/2016     RIGHT URETEROSCOPY , RIGHT STENT PLACEMENT    HEMORRHOID SURGERY      JOINT REPLACEMENT      right shoulder replacement    OTHER SURGICAL HISTORY  10/12/2016    CYSTOSCOPY, DIAGNOSTIC RIGHT URETEROSCOPY, RIGHT RETROGRADE    [place] COLONOSCOPY FLX DX W/COLLJ SPEC WHEN PFRMD N/A 8/15/2018    COLONOSCOPY performed by Myron Bah MD at [address] Left 07/02/13    left total shoulder replacement       Family History   Problem Relation Age of Onset    Heart Disease Mother     High Blood Pressure Mother     Arthritis Mother     Heart Disease Father     Arthritis Father        Social History     Tobacco Use    Smoking status: Every Day     Packs/day: 1.00     Years: 30.00     Pack years: 30.00     Types: Cigarettes     Start date: 1/1/1989    Smokeless tobacco: Never   Substance Use Topics    Alcohol use: Yes     Alcohol/week: 0.0 standard drinks     Comment: occasionally -2 x month      Current Outpatient Medications   Medication Sig Dispense Refill    doxycycline hyclate (VIBRA-TABS) 100 MG tablet Take 1 tablet by mouth 2 times daily for 10 days 20 tablet 0    omeprazole (PRILOSEC) 40 MG delayed release capsule Take 1 capsule by mouth every morning (before breakfast) 90 capsule 0    Vitamin D (CHOLECALCIFEROL) 25 MCG (1000 UT) TABS tablet TAKE 2 TABLETS BY MOUTH DAILY 200 tablet 0    diclofenac (VOLTAREN) 75 MG EC tablet TAKE 1 TABLET BY MOUTH 2 TIMES DAILY 60 tablet 0    enalapril (VASOTEC) 20 MG tablet TAKE 1 TABLET BY MOUTH 2 TIMES DAILY 60 tablet 0    Omega-3 Fatty Acids (FISH OIL) 1000 MG capsule TAKE 6 CAPSULES TWICE DAILY 300 capsule 0    HYDROcodone-acetaminophen (NORCO) 5-325 MG per tablet Take 1 tablet by mouth 2 times daily as needed for Pain for up to 30 days.  60 tablet 0    [START ON 12/14/2022] HYDROcodone-acetaminophen (NORCO) 5-325 MG per tablet Take 1 tablet by mouth 2 times daily as needed for Pain for up to 30 days. 60 tablet 0    niacin (NIASPAN) 1000 MG extended release tablet TAKE ONE TABLET BY MOUTH NIGHTLY 90 tablet 0    simvastatin (ZOCOR) 20 MG tablet TAKE 1 TABLET AT BEDTIME 30 tablet 0    Ferrous Sulfate (IRON) 325 (65 Fe) MG TABS TAKE ONE TABLET BY MOUTH DAILY 100 tablet 5    Biotin 1000 MCG TABS Take 1 tablet by mouth daily       allopurinol (ZYLOPRIM) 100 MG tablet Take 200 mg by mouth daily       Misc Natural Products (OSTEO BI-FLEX TRIPLE STRENGTH PO) Take 2 capsules by mouth daily. No current facility-administered medications for this visit. Allergies   Allergen Reactions    Suprax [Cefixime] Itching     Swollen hands and itching       :      Review of Systems   Constitutional:  Negative for activity change, appetite change, chills, diaphoresis, fatigue, fever and unexpected weight change. Respiratory: Negative. Cardiovascular: Negative. Skin:  Positive for wound (Left lower quadrant abdomen region). Negative for color change, pallor and rash. Allergic/Immunologic: Negative for environmental allergies. Neurological: Negative. Psychiatric/Behavioral: Negative. Objective:     Vitals:    11/28/22 1140   BP: 132/66   Site: Right Upper Arm   Position: Sitting   Cuff Size: Medium Adult   Pulse: 89   SpO2: 96%   Weight: 239 lb (108.4 kg)   Height: 5' 10\" (1.778 m)     Wt Readings from Last 3 Encounters:   11/28/22 239 lb (108.4 kg)   11/10/22 242 lb (109.8 kg)   10/25/22 242 lb (109.8 kg)     Temp Readings from Last 3 Encounters:   08/08/22 98.5 °F (36.9 °C) (Tympanic)   01/18/22 98 °F (36.7 °C) (Tympanic)   10/19/21 97.8 °F (36.6 °C)     BP Readings from Last 3 Encounters:   11/28/22 132/66   10/25/22 128/68   08/08/22 100/63     Pulse Readings from Last 3 Encounters:   11/28/22 89   10/25/22 96   08/08/22 64     Physical Exam  Vitals and nursing note reviewed. Constitutional:       General: He is not in acute distress. Appearance: Normal appearance. He is well-developed. He is obese. He is not diaphoretic. HENT:      Head: Normocephalic and atraumatic. Right Ear: External ear normal.      Left Ear: External ear normal.      Nose: Nose normal.   Eyes:      General:         Right eye: No discharge. Left eye: No discharge. Conjunctiva/sclera: Conjunctivae normal.   Cardiovascular:      Rate and Rhythm: Normal rate. Pulmonary:      Effort: Pulmonary effort is normal.   Abdominal:       Musculoskeletal:         General: No deformity. Normal range of motion. Cervical back: Normal range of motion and neck supple. No rigidity. Skin:     General: Skin is warm and dry. Coloration: Skin is not jaundiced or pale. Findings: No bruising, erythema, lesion or rash. Neurological:      Mental Status: He is alert and oriented to person, place, and time. Mental status is at baseline. Psychiatric:         Mood and Affect: Mood normal.         Behavior: Behavior normal.         Thought Content: Thought content normal.         Judgment: Judgment normal.       Orders Only on 10/26/2022   Component Date Value Ref Range Status    Iron 10/25/2022 65  59 - 158 ug/dL Final    TIBC 10/25/2022 225 (A)  260 - 445 ug/dL Final    Iron Saturation 10/25/2022 29  20 - 50 % Final    Ferritin 10/25/2022 419.3 (A)  30.0 - 400.0 ng/mL Final           Assessment & Plan: The following diagnoses and conditions are stable with no further orders unless indicated:  1. Abscess        Angelo Aponte was seen today for other. Incision and drainage performed - please see procedure note. Purulent drainage expressed. Wound culture performed. Concerned for staph. Rx sent for Doxycycline. Recommend warm compresses to the area several times a day for 10 to 15 minutes at a time. Recommend wearing clothes that are not tight and do not rub into skin. Recommend no belt while area is healing.   Recommend cleansing area two times a day with warm water and soap and covering with non-adhesive bandage. He verbalizes understanding. Follow up in one week. Recommend going to the ER with any worsening symptoms such as fever greater than 102 not improving with Tylenol, increased pain, increased redness, increased warmth to the area. He verbalizes understanding. Information given below verbally and in office regarding abscess as well. Diagnoses and all orders for this visit:    Abscess  -     doxycycline hyclate (VIBRA-TABS) 100 MG tablet; Take 1 tablet by mouth 2 times daily for 10 days  -     Culture, Wound Aerobic Only  -     [address] - ME DRAIN SKIN ABSCESS SIMPLE    Prior to Visit Medications    Medication Sig Taking? Authorizing Provider   doxycycline hyclate (VIBRA-TABS) 100 MG tablet Take 1 tablet by mouth 2 times daily for 10 days Yes KARLOS Roe CNP   omeprazole (PRILOSEC) 40 MG delayed release capsule Take 1 capsule by mouth every morning (before breakfast) Yes KARLOS Roe CNP   Vitamin D (CHOLECALCIFEROL) 25 MCG (1000 UT) TABS tablet TAKE 2 TABLETS BY MOUTH DAILY Yes KARLOS Roe CNP   diclofenac (VOLTAREN) 75 MG EC tablet TAKE 1 TABLET BY MOUTH 2 TIMES DAILY Yes KARLOS Roe CNP   enalapril (VASOTEC) 20 MG tablet TAKE 1 TABLET BY MOUTH 2 TIMES DAILY Yes KARLOS Roe CNP   Omega-3 Fatty Acids (FISH OIL) 1000 MG capsule TAKE 6 CAPSULES TWICE DAILY Yes KARLOS Roe CNP   HYDROcodone-acetaminophen (NORCO) 5-325 MG per tablet Take 1 tablet by mouth 2 times daily as needed for Pain for up to 30 days. Yes Corazon Garcia MD   HYDROcodone-acetaminophen (NORCO) 5-325 MG per tablet Take 1 tablet by mouth 2 times daily as needed for Pain for up to 30 days.  Yes Corazon Garcia MD   niacin (NIASPAN) 1000 MG extended release tablet TAKE ONE TABLET BY MOUTH NIGHTLY Yes Sunitha Quintana MD   simvastatin (ZOCOR) 20 MG tablet TAKE 1 TABLET AT BEDTIME Yes Sunitha Quintana MD   Ferrous Sulfate (IRON) 325 (65 Fe) MG TABS TAKE ONE TABLET BY MOUTH DAILY Yes Nu Polk MD   Biotin 1000 MCG TABS Take 1 tablet by mouth daily  Yes Historical Provider, MD   allopurinol (ZYLOPRIM) 100 MG tablet Take 200 mg by mouth daily  Yes Historical Provider, MD   Misc Natural Products (OSTEO BI-FLEX TRIPLE STRENGTH PO) Take 2 capsules by mouth daily. Yes Historical Provider, MD     Orders Placed This Encounter   Medications    doxycycline hyclate (VIBRA-TABS) 100 MG tablet     Sig: Take 1 tablet by mouth 2 times daily for 10 days     Dispense:  20 tablet     Refill:  0         Return if symptoms worsen or fail to improve. Patient should call the office immediately with new or ongoing signs or symptoms or worsening, or proceedto the emergency room. No changes in past medical history, past surgical history, social history, or family history were noted during the patient encounter unless specifically listed above. All updates of past medicalhistory, past surgical history, social history, or family history were reviewed personally by me during the office visit. All problems listed in the assessment are stable unless noted otherwise. Medication profilereviewed personally by me during the office visit. Medication side effects and possible impairments from medications were discussed as applicable. Call if pattern of symptoms change or persists for an extended time. This document was prepared by a combination of typing and transcription through a voice recognition software. All medications have the potential for adverse effects. All medications effect each person differently. Please read and review provided information related to medication. If the medication that you have been prescribed has the potential to cause sedation, do not drive or operate car, truck, or heavy machinery until you know how the medication will effect you.  If you experience any adverse effects from the medication, please call the office or report to the emergency department. Skin Abscess: Care Instructions  Overview     A skin abscess is a bacterial infection that forms a pocket of pus. A boil is a kind of skin abscess. The doctor may have cut an opening in the abscess so that the pus can drain out. You may have gauze in the cut so that the abscess will stay open and keep draining. You may need antibiotics. You will need to follow up with your doctor to make sure the infection has gone away. The doctor has checked you carefully, but problems can develop later. If you notice any problems or new symptoms, get medical treatment right away. Follow-up care is a key part of your treatment and safety. Be sure to make and go to all appointments, and call your doctor if you are having problems. It's also a good idea to know your test results and keep a list of the medicines you take. How can you care for yourself at home? Apply warm and dry compresses, a heating pad set on low, or a hot water bottle 3 or 4 times a day for pain. Keep a cloth between the heat source and your skin. If your doctor prescribed antibiotics, take them as directed. Do not stop taking them just because you feel better. You need to take the full course of antibiotics. Take pain medicines exactly as directed. If the doctor gave you a prescription medicine for pain, take it as prescribed. If you are not taking a prescription pain medicine, ask your doctor if you can take an over-the-counter medicine. Keep your bandage clean and dry. Change the bandage whenever it gets wet or dirty, or at least one time a day. If the abscess was packed with gauze:  Keep follow-up appointments to have the gauze changed or removed. If the doctor instructed you to remove the gauze, follow the instructions you were given for how to remove it. After the gauze is removed, soak the area in warm water for 15 to 20 minutes 2 times a day, until the wound closes.   When should you call for

## 2022-11-29 ENCOUNTER — TELEPHONE (OUTPATIENT)
Dept: FAMILY MEDICINE CLINIC | Age: 67
End: 2022-11-29

## 2022-11-29 LAB
REASON FOR REJECTION: NORMAL
REJECTED TEST: NORMAL

## 2022-11-29 NOTE — TELEPHONE ENCOUNTER
Antonieta with [facility] called to inform that the wound culture was rejected due to using an  swab.    Routing to [place] due to seeing pt

## 2022-12-05 ENCOUNTER — OFFICE VISIT (OUTPATIENT)
Dept: FAMILY MEDICINE CLINIC | Age: 67
End: 2022-12-05
Payer: MEDICARE

## 2022-12-05 VITALS
SYSTOLIC BLOOD PRESSURE: 120 MMHG | WEIGHT: 233 LBS | HEART RATE: 67 BPM | OXYGEN SATURATION: 97 % | BODY MASS INDEX: 33.36 KG/M2 | HEIGHT: 70 IN | DIASTOLIC BLOOD PRESSURE: 80 MMHG

## 2022-12-05 DIAGNOSIS — M25.551 RIGHT HIP PAIN: ICD-10-CM

## 2022-12-05 DIAGNOSIS — K42.9 UMBILICAL HERNIA WITHOUT OBSTRUCTION AND WITHOUT GANGRENE: ICD-10-CM

## 2022-12-05 DIAGNOSIS — L72.3 INFLAMED SEBACEOUS CYST: Primary | ICD-10-CM

## 2022-12-05 PROCEDURE — 1123F ACP DISCUSS/DSCN MKR DOCD: CPT | Performed by: NURSE PRACTITIONER

## 2022-12-05 PROCEDURE — G8484 FLU IMMUNIZE NO ADMIN: HCPCS | Performed by: NURSE PRACTITIONER

## 2022-12-05 PROCEDURE — G8427 DOCREV CUR MEDS BY ELIG CLIN: HCPCS | Performed by: NURSE PRACTITIONER

## 2022-12-05 PROCEDURE — 3078F DIAST BP <80 MM HG: CPT | Performed by: NURSE PRACTITIONER

## 2022-12-05 PROCEDURE — 3017F COLORECTAL CA SCREEN DOC REV: CPT | Performed by: NURSE PRACTITIONER

## 2022-12-05 PROCEDURE — G8417 CALC BMI ABV UP PARAM F/U: HCPCS | Performed by: NURSE PRACTITIONER

## 2022-12-05 PROCEDURE — 99213 OFFICE O/P EST LOW 20 MIN: CPT | Performed by: NURSE PRACTITIONER

## 2022-12-05 PROCEDURE — 1036F TOBACCO NON-USER: CPT | Performed by: NURSE PRACTITIONER

## 2022-12-05 PROCEDURE — 3074F SYST BP LT 130 MM HG: CPT | Performed by: NURSE PRACTITIONER

## 2022-12-05 ASSESSMENT — ENCOUNTER SYMPTOMS
EYE PAIN: 0
SHORTNESS OF BREATH: 0
BLOOD IN STOOL: 0
STRIDOR: 0
GASTROINTESTINAL NEGATIVE: 1
SINUS PRESSURE: 0
EYE REDNESS: 0
CONSTIPATION: 0
SORE THROAT: 0
ALLERGIC/IMMUNOLOGIC NEGATIVE: 1
CHEST TIGHTNESS: 0
ABDOMINAL PAIN: 0
RHINORRHEA: 0
WHEEZING: 0
APNEA: 0
NAUSEA: 0
PHOTOPHOBIA: 0
COUGH: 0
EYE DISCHARGE: 0
RESPIRATORY NEGATIVE: 1
VOMITING: 0
COLOR CHANGE: 0
CHOKING: 0
BACK PAIN: 0
TROUBLE SWALLOWING: 0
EYE ITCHING: 0
DIARRHEA: 0

## 2022-12-05 NOTE — PROGRESS NOTES
[facility] PHYSICIAN PRACTICES  [facility] FAMILY MEDICINE  [address] [place] [address]  [place] [address] [address]  Dept: [phone]  Dept Fax: [phone]  Loc: [phone]    Jonn Latham is a 78 y.o. male who presents today for his medical conditions/complaints as noted below. Jonn Latham is c/o of Other (Pt feels the abscess on his belt line is doing much better. )        HPI:     Chief Complaint   Patient presents with    Other     Pt feels the abscess on his belt line is doing much better. HPI    Sita Clinton presents to the office today to follow up on his abscess from 11/28/2022. He had an abscess in his left upper groin region and was given doxycycline after an incision and drainage. He was instructed to place warm compresses on the area. Today, Sita Clinton admits to the area feeling much better. He has not noticed anymore drainage. He is not having anymore pain or tenderness from his abscess. He admits he has three more days of his antibiotic. H denies any chills or fevers. He admits he is feeling much better and states he has a pre-op scheduled for his hernia in the next several days, but may cancel it as he feels he may need his hip to have surgery first prior to his hernia surgery. He denies any pain from his hernia.      Past Medical History:   Diagnosis Date    Arthritis     CTS (carpal tunnel syndrome)     Erectile dysfunction     GERD (gastroesophageal reflux disease)     Hyperglycemia     Hyperlipidemia     Hypertension     Kidney stone 09/2016    Malignant neoplasm of prostate ([address] 4/19/2021    AJ treated with BiPAP     Osteoarthritis     Trigger thumb     Vitamin D deficiency       Past Surgical History:   Procedure Laterality Date    APPENDECTOMY      BACK SURGERY      CARPAL TUNNEL RELEASE  11/12    right    COLONOSCOPY  08/14/2018    diverticulosis    CYSTOSCOPY Right 09/28/2016     RIGHT URETEROSCOPY , RIGHT STENT PLACEMENT    HEMORRHOID SURGERY      JOINT REPLACEMENT      right shoulder replacement    OTHER SURGICAL HISTORY  10/12/2016    CYSTOSCOPY, DIAGNOSTIC RIGHT URETEROSCOPY, RIGHT RETROGRADE    [place] COLONOSCOPY FLX DX W/COLLJ SPEC WHEN PFRMD N/A 8/15/2018    COLONOSCOPY performed by Hemalatha Olivas MD at [address]    left total shoulder replacement       Family History   Problem Relation Age of Onset    Heart Disease Mother     High Blood Pressure Mother     Arthritis Mother     Heart Disease Father     Arthritis Father        Social History     Tobacco Use    Smoking status: Former     Packs/day: 1.00     Years: 30.00     Pack years: 30.00     Types: Cigarettes     Start date: 1989     Quit date: 2022     Years since quittin.0    Smokeless tobacco: Never   Substance Use Topics    Alcohol use: Yes     Alcohol/week: 0.0 standard drinks     Comment: occasionally -2 x month      Current Outpatient Medications   Medication Sig Dispense Refill    doxycycline hyclate (VIBRA-TABS) 100 MG tablet Take 1 tablet by mouth 2 times daily for 10 days 20 tablet 0    omeprazole (PRILOSEC) 40 MG delayed release capsule Take 1 capsule by mouth every morning (before breakfast) 90 capsule 0    Vitamin D (CHOLECALCIFEROL) 25 MCG (1000 UT) TABS tablet TAKE 2 TABLETS BY MOUTH DAILY 200 tablet 0    diclofenac (VOLTAREN) 75 MG EC tablet TAKE 1 TABLET BY MOUTH 2 TIMES DAILY 60 tablet 0    enalapril (VASOTEC) 20 MG tablet TAKE 1 TABLET BY MOUTH 2 TIMES DAILY 60 tablet 0    Omega-3 Fatty Acids (FISH OIL) 1000 MG capsule TAKE 6 CAPSULES TWICE DAILY 300 capsule 0    HYDROcodone-acetaminophen (NORCO) 5-325 MG per tablet Take 1 tablet by mouth 2 times daily as needed for Pain for up to 30 days. 60 tablet 0    [START ON 2022] HYDROcodone-acetaminophen (NORCO) 5-325 MG per tablet Take 1 tablet by mouth 2 times daily as needed for Pain for up to 30 days.  60 tablet 0    niacin (NIASPAN) 1000 MG extended release tablet TAKE ONE TABLET BY MOUTH NIGHTLY 90 tablet 0    simvastatin (ZOCOR) 20 MG tablet TAKE 1 TABLET AT BEDTIME 30 tablet 0    Ferrous Sulfate (IRON) 325 (65 Fe) MG TABS TAKE ONE TABLET BY MOUTH DAILY 100 tablet 5    Biotin 1000 MCG TABS Take 1 tablet by mouth daily       allopurinol (ZYLOPRIM) 100 MG tablet Take 200 mg by mouth daily       [facility] Natural Products (OSTEO BI-FLEX TRIPLE STRENGTH PO) Take 2 capsules by mouth daily. No current facility-administered medications for this visit. Allergies   Allergen Reactions    Suprax [Cefixime] Itching     Swollen hands and itching       :      Review of Systems   Constitutional: Negative. Negative for activity change, appetite change, chills, diaphoresis, fatigue, fever and unexpected weight change. HENT: Negative. Negative for ear pain, rhinorrhea, sinus pressure, sneezing, sore throat and trouble swallowing. Eyes:  Negative for photophobia, pain, discharge, redness, itching and visual disturbance. Respiratory: Negative. Negative for apnea, cough, choking, chest tightness, shortness of breath, wheezing and stridor. Cardiovascular:  Negative for chest pain, palpitations and leg swelling. Gastrointestinal: Negative. Negative for abdominal pain, blood in stool, constipation, diarrhea, nausea and vomiting. Genitourinary: Negative. Negative for decreased urine volume, difficulty urinating, dysuria, enuresis, flank pain, frequency, genital sores, hematuria and urgency. Musculoskeletal:  Positive for arthralgias (Right hip) and myalgias (Right hip pain). Negative for back pain, gait problem, joint swelling, neck pain and neck stiffness. Skin: Negative. Negative for color change, pallor, rash and wound. Allergic/Immunologic: Negative. Neurological: Negative. Negative for dizziness, facial asymmetry, weakness, light-headedness and headaches.    Psychiatric/Behavioral:  Negative for agitation, behavioral problems, confusion, decreased concentration, dysphoric mood, hallucinations, self-injury, sleep disturbance and suicidal ideas. The patient is not nervous/anxious and is not hyperactive. Objective:     Vitals:    12/05/22 0921   BP: 120/80   Site: Right Upper Arm   Position: Sitting   Cuff Size: Large Adult   Pulse: 67   SpO2: 97%   Weight: 233 lb (105.7 kg)   Height: 5' 10\" (1.778 m)     Wt Readings from Last 3 Encounters:   12/05/22 233 lb (105.7 kg)   11/28/22 239 lb (108.4 kg)   11/10/22 242 lb (109.8 kg)     Temp Readings from Last 3 Encounters:   08/08/22 98.5 °F (36.9 °C) (Tympanic)   01/18/22 98 °F (36.7 °C) (Tympanic)   10/19/21 97.8 °F (36.6 °C)     BP Readings from Last 3 Encounters:   12/05/22 120/80   11/28/22 132/66   10/25/22 128/68     Pulse Readings from Last 3 Encounters:   12/05/22 67   11/28/22 89   10/25/22 96     Physical Exam  Vitals and nursing note reviewed. Constitutional:       General: He is not in acute distress. Appearance: Normal appearance. He is well-developed. He is obese. He is not diaphoretic. HENT:      Head: Normocephalic and atraumatic. Right Ear: External ear normal.      Left Ear: External ear normal.      Nose: Nose normal.   Eyes:      General:         Right eye: No discharge. Left eye: No discharge. Conjunctiva/sclera: Conjunctivae normal.   Cardiovascular:      Rate and Rhythm: Normal rate. Pulmonary:      Effort: Pulmonary effort is normal.   Musculoskeletal:         General: No deformity. Normal range of motion. Cervical back: Normal range of motion and neck supple. No rigidity. Skin:     General: Skin is warm and dry. Coloration: Skin is not jaundiced or pale. Findings: No abscess, bruising, erythema, lesion or rash. Neurological:      Mental Status: He is alert and oriented to person, place, and time. Mental status is at baseline. Psychiatric:         Mood and Affect: Mood normal.         Behavior: Behavior normal.         Thought Content:  Thought content normal. Judgment: Judgment normal.       Office Visit on 11/28/2022   Component Date Value Ref Range Status    Rejected Test 11/29/2022 CXWND   Final    Reason for Rejection 11/29/2022 see below   Final    Comment: Unable to perform testing; specimen collected in incorrect  collection container. To perform testing the specimen will  need to be recollected. Exp swab             Assessment & Plan: The following diagnoses and conditions are stable with no further orders unless indicated:  1. Inflamed sebaceous cyst    2. Umbilical hernia without obstruction and without gangrene    3. Right hip pain        Ivy Gardiner was seen today for other. Inflamed sebaceous cyst healing well. Scant amout of purulent drainage noted. Recommend warm compresses to the area still several times a day for 15 minutes at a time. He is not having any hernia pain. He admits to significant amount of right hip pain. Recommend him discuss his right hip pain with his surgeon to see about him getting his right hip surgery prior to his hernia surgery. He is going to schedule his pre-op accordingly. Diagnoses and all orders for this visit:    Inflamed sebaceous cyst    Umbilical hernia without obstruction and without gangrene    Right hip pain    Prior to Visit Medications    Medication Sig Taking?  Authorizing Provider   doxycycline hyclate (VIBRA-TABS) 100 MG tablet Take 1 tablet by mouth 2 times daily for 10 days Yes KARLOS Rosario CNP   omeprazole (PRILOSEC) 40 MG delayed release capsule Take 1 capsule by mouth every morning (before breakfast) Yes KARLOS Rosario CNP   Vitamin D (CHOLECALCIFEROL) 25 MCG (1000 UT) TABS tablet TAKE 2 TABLETS BY MOUTH DAILY Yes KARLOS Rosario CNP   diclofenac (VOLTAREN) 75 MG EC tablet TAKE 1 TABLET BY MOUTH 2 TIMES DAILY Yes KARLOS Rosario CNP   enalapril (VASOTEC) 20 MG tablet TAKE 1 TABLET BY MOUTH 2 TIMES DAILY Yes KARLOS Rosario CNP Omega-3 Fatty Acids (FISH OIL) 1000 MG capsule TAKE 6 CAPSULES TWICE DAILY Yes KARLOS Wilks CNP   HYDROcodone-acetaminophen (NORCO) 5-325 MG per tablet Take 1 tablet by mouth 2 times daily as needed for Pain for up to 30 days. Yes Herbie Mo MD   HYDROcodone-acetaminophen (NORCO) 5-325 MG per tablet Take 1 tablet by mouth 2 times daily as needed for Pain for up to 30 days. Yes Herbie Mo MD   niacin (NIASPAN) 1000 MG extended release tablet TAKE ONE TABLET BY MOUTH NIGHTLY Yes Shasta Arzola MD   simvastatin (ZOCOR) 20 MG tablet TAKE 1 TABLET AT BEDTIME Yes Shasta Arzola MD   Ferrous Sulfate (IRON) 325 (65 Fe) MG TABS TAKE ONE TABLET BY MOUTH DAILY Yes Shasta Arzola MD   Biotin 1000 MCG TABS Take 1 tablet by mouth daily  Yes Historical Provider, MD   allopurinol (ZYLOPRIM) 100 MG tablet Take 200 mg by mouth daily  Yes Historical Provider, MD   Misc Natural Products (OSTEO BI-FLEX TRIPLE STRENGTH PO) Take 2 capsules by mouth daily. Yes Historical Provider, MD     No orders of the defined types were placed in this encounter. Return if symptoms worsen or fail to improve. Patient should call the office immediately with new or ongoing signs or symptoms or worsening, or proceedto the emergency room. No changes in past medical history, past surgical history, social history, or family history were noted during the patient encounter unless specifically listed above. All updates of past medicalhistory, past surgical history, social history, or family history were reviewed personally by me during the office visit. All problems listed in the assessment are stable unless noted otherwise. Medication profilereviewed personally by me during the office visit. Medication side effects and possible impairments from medications were discussed as applicable. Call if pattern of symptoms change or persists for an extended time.     This document was prepared by a combination of typing and transcription through a voice recognition software. All medications have the potential for adverse effects. All medications effect each person differently. Please read and review provided information related to medication. If the medication that you have been prescribed has the potential to cause sedation, do not drive or operate car, truck, or heavy machinery until you know how the medication will effect you. If you experience any adverse effects from the medication, please call the office or report to the emergency department.

## 2022-12-07 RX ORDER — SIMVASTATIN 20 MG
TABLET ORAL
Qty: 90 TABLET | Refills: 3 | Status: SHIPPED | OUTPATIENT
Start: 2022-12-07

## 2022-12-08 ENCOUNTER — OFFICE VISIT (OUTPATIENT)
Dept: ORTHOPEDIC SURGERY | Age: 67
End: 2022-12-08

## 2022-12-08 VITALS — HEIGHT: 70 IN | BODY MASS INDEX: 33.36 KG/M2 | WEIGHT: 233 LBS

## 2022-12-08 DIAGNOSIS — Z01.818 PREOP TESTING: Primary | ICD-10-CM

## 2022-12-08 DIAGNOSIS — M16.11 OSTEOARTHRITIS OF ONE HIP, RIGHT: ICD-10-CM

## 2022-12-08 NOTE — PROGRESS NOTES
[place]    Age 78 y.o.    male    1955    MRN [id number]    1/17/2023  Arrival Time_____________  OR Time____________[address]     Procedure(s):  RIGHT TOTAL HIP ARTHROPLASTY MINIMALLY INVASIVE DIRECT ANTERIOR     ALESIA BIOMET                      General   Surgeon(s): Betsey Ugalde, MD      DAY ADMIT ___  SDS/OP ___  OUTPT IN BED ___        Phone [phone] (home) [phone] (work)    PCP _____________________ Phone_________________ [address] ( ) Epic CE ( ) Appt ________    ADDITIONAL INFO __________________________________ Cardio/Consult _____________    NOTES _____________________________________________________________________    ____________________________________________________________________________    PAT APPT DATE:________ TIME: ________  FAXED QAD: _______  (__) H&P w/ Hospitalist  __________________________________________________________________________  Preop Nurse phone screen complete: _____________  (__) CBC     (__) W/ DIFF ___________     (__) Hgb A1C    ___________  (__) CHEST X RAY   __________  (__) LIPID PROFILE  ___________  (__) EKG   __________  (__) PT/PTT   ___________  (__) PFT's   __________  (__) BMP   ___________  (__) CAROTIDS  __________  (__) CMP   ___________  (__) VEIN MAPPING  __________  (__) U/A   ___________  (__) HISTORY & PHYSICAL __________  (__) URINE C & S  ___________  (__) CARDIAC CLEARANCE __________  (__) U/A W/ FLEX  ___________  (__) PULM.  CLEARANCE __________  (__) SERUM PREGNANCY ___________  (__) Check Epic DOS orders __________  (__) TYPE & SCREEN __________repeat ( ) (__)  __________________ __________  (__) ALBUMIN   ___________  (__)  __________________ __________  (__) TRANSFERRIN  ___________  (__)  __________________ __________  (__) LIVER PROFILE  ___________  (__)  __________________ __________  (__) MRSA NASAL SWAB ___________  (__) URINE PREG DOS __________  (__) SED RATE  ___________  (__) BLOOD SUGAR DOS __________  (__) C-REACTIVE PROTEIN ___________    (__) VITAMIN D HYDROXY ___________  (__) BLOOD THINNERS __________    (__) ACE/ ARBS: _____________________     (__) BETABLOCKERS __________________

## 2022-12-08 NOTE — LETTER
[facility]  Surgery Scheduling Form:    22     DEMOGRAPHICS    Patient Name:  Shelley Aldana  Patient :  1955   Patient SS#:  [id number]    Patient Phone:  [phone] (home) [phone] (work) Alt. Patient Phone:    Patient Address:  [place] [address]  [address] [address]    PCP:  Unice Dakin, MD  Insurance:  Payor: MEDICARE / Plan: MEDICARE PART A AND B / Product Type: *No Product type* /   Insurance ID Number:  Payer/Plan Subscr  Sex Relation Sub. Ins. ID Effective Group Num   1. MEDICARE - [place] 1955 Male Self [id number] 1/1/15                                    [address] [address]   2.  [address] 1955 Male Self [id number] 1/1/15 [id number]                                   [address]       DIAGNOSIS & PROCEDURE    Diagnosis: Right Hip   M16.11 Right hip primary osteoarthritis    Operation: RIGHT Hip   25716 Total Hip Arthroplasty Minimally-Invasive Direct Anterior     Provider:  Veronica Anglin MD    Location:  [facility]      Requested Date:  2023   Requested Time:  11:30 am       Patient Arrival Time:  9:30 am   OR Time Required:  100 Minutes  Admission:  []Outpatient   []23 hour  [x]Same Day Admit:   1-2  days  []Inpatient    Anesthesia:  [x]General  []Spinal  []MAC/Sedation  Regional Anesthesia:  [x]None  []OrthoMix  []Exparel  []Fascia Iliaca / PENG       EQUIPMENT    Position:  [x]Supine  []Lateral  []Beach-chair  []Prone    OR Bed:  []Regular  [x]Josué  []Juan  []Hip sharma  []Beach-chair  []Spyder  Radiology:  [x]Large C-arm  []Small C-arm  []Portable X-ray    Implants:  Rhonda[employer]omet Hip:  [x]Primary Set  []Revision Set  Medacta Hip:  []Dual-modular acetabulum (DM)    SUTURE: []#5 Ethibond  [x]#2 Ethibond  [x]#2 Quill  []#1 PDS  [x]#1 Vicryl                   [x]2-0 Vicryl  [x]3-0 Monocryl  []2-0 Nylon  []3-0 Nylon  []3-0 PDS                    []Dermabond  []Steri-strips (in half)  DRESSING:  [x]Chidi dermabond  []4x4 gauze  [x]ABDs  [x]Tegaderm  BRACE: []Pelvic Binder  []Hip X-ACT  []Knee TROM  []Knee immobilizer                 []Shoulder Immob. (w/abd. pillow)  []Sling  []Ice Unit  []Ace-Wrap      [x][employer]onda [employer]:  Tram Hernandez [phone], [email or web address]  []Medacta: Cullen Wilson [phone], [email or web address]  []Fx Shoulder: Troy Castañeda [phone], [email or web address]  []Robert: Lizz Humphries [phone], [email or web address]    Comments:        Denisse Iyer MD  [facility]  12/8/2022       12:36 PM EST

## 2022-12-08 NOTE — PROGRESS NOTES
Dr Martínez Charla      Date /Time 12/8/2022       11:41 AM EST  Name Nataly Blake             1955   Location  [address]  MRN [id number]                Chief Complaint   Patient presents with    Knee Pain     CK R HIP        History of Present Illness    Nataly Blake is a 78 y.o. male who presents with  right hip and knee pain. Injury Mechanism:  none. Worker's Comp. & legal issues:   none. Previous Treatments: Ice, Heat, and NSAIDs    Patient presents the office today for follow-up visit. Patient being treated for moderate right knee osteoarthritis and advanced right hip osteoarthritis. Patient did receive an intra-articular cortisone injection in the right knee at his last visit. His right knee is doing better. He is here for further discussion concerning right total hip arthroplasty. At his last visit he was an everyday smoker. Previous history: Patient presents to the office today for a new problem. Patient here with a chief complaint of right hip greater than right knee pain. Symptoms have been present for several months without injury or trauma. Increased pain with activities and improvement with rest.  He is scheduled for a abdominal hernia repair next month with a general surgeon. Pain is concentrated over his groin and also over his lateral knee.     Past History  Past Medical History:   Diagnosis Date    Arthritis     CTS (carpal tunnel syndrome)     Erectile dysfunction     GERD (gastroesophageal reflux disease)     Hyperglycemia     Hyperlipidemia     Hypertension     Kidney stone 09/2016    Malignant neoplasm of prostate ([facility] [address] 4/19/2021    AJ treated with BiPAP     Osteoarthritis     Trigger thumb     Vitamin D deficiency      Past Surgical History:   Procedure Laterality Date    APPENDECTOMY      BACK SURGERY      CARPAL TUNNEL RELEASE  11/12    right    COLONOSCOPY  08/14/2018    diverticulosis    CYSTOSCOPY Right 09/28/2016     RIGHT URETEROSCOPY , RIGHT STENT PLACEMENT    HEMORRHOID SURGERY      JOINT REPLACEMENT      right shoulder replacement    OTHER SURGICAL HISTORY  10/12/2016    CYSTOSCOPY, DIAGNOSTIC RIGHT URETEROSCOPY, RIGHT RETROGRADE    LA COLONOSCOPY FLX DX W/COLLJ SPEC WHEN PFRMD N/A 8/15/2018    COLONOSCOPY performed by Sofía Yates MD at [address]    left total shoulder replacement     Family History   Problem Relation Age of Onset    Heart Disease Mother     High Blood Pressure Mother     Arthritis Mother     Heart Disease Father     Arthritis Father      Social History     Tobacco Use    Smoking status: Former     Packs/day: 1.00     Years: 30.00     Pack years: 30.00     Types: Cigarettes     Start date: 1989     Quit date: 2022     Years since quittin.0    Smokeless tobacco: Never   Substance Use Topics    Alcohol use: Yes     Alcohol/week: 0.0 standard drinks     Comment: occasionally -2 x month      Current Outpatient Medications on File Prior to Visit   Medication Sig Dispense Refill    simvastatin (ZOCOR) 20 MG tablet TAKE 1 TABLET AT BEDTIME 90 tablet 3    doxycycline hyclate (VIBRA-TABS) 100 MG tablet Take 1 tablet by mouth 2 times daily for 10 days 20 tablet 0    omeprazole (PRILOSEC) 40 MG delayed release capsule Take 1 capsule by mouth every morning (before breakfast) 90 capsule 0    Vitamin D (CHOLECALCIFEROL) 25 MCG (1000 UT) TABS tablet TAKE 2 TABLETS BY MOUTH DAILY 200 tablet 0    diclofenac (VOLTAREN) 75 MG EC tablet TAKE 1 TABLET BY MOUTH 2 TIMES DAILY 60 tablet 0    enalapril (VASOTEC) 20 MG tablet TAKE 1 TABLET BY MOUTH 2 TIMES DAILY 60 tablet 0    Omega-3 Fatty Acids (FISH OIL) 1000 MG capsule TAKE 6 CAPSULES TWICE DAILY 300 capsule 0    HYDROcodone-acetaminophen (NORCO) 5-325 MG per tablet Take 1 tablet by mouth 2 times daily as needed for Pain for up to 30 days.  60 tablet 0    [START ON 2022] HYDROcodone-acetaminophen (Panfilo Norman) 5-325 MG per tablet Take 1 tablet by mouth 2 times daily as needed for Pain for up to 30 days. 60 tablet 0    niacin (NIASPAN) 1000 MG extended release tablet TAKE ONE TABLET BY MOUTH NIGHTLY 90 tablet 0    Ferrous Sulfate (IRON) 325 (65 Fe) MG TABS TAKE ONE TABLET BY MOUTH DAILY 100 tablet 5    Biotin 1000 MCG TABS Take 1 tablet by mouth daily       allopurinol (ZYLOPRIM) 100 MG tablet Take 200 mg by mouth daily       Misc Natural Products (OSTEO BI-FLEX TRIPLE STRENGTH PO) Take 2 capsules by mouth daily. No current facility-administered medications on file prior to visit. ASCVD 10-YEAR RISK SCORE  The ASCVD Risk score (Willi DK, et al., 2019) failed to calculate for the following reasons: The valid total cholesterol range is 130 to 320 mg/dL   . Review of Systems  10-point ROS is negative other than HPI. Physical Exam  Based off 1997 Exam Criteria  Ht 5' 10\" (1.778 m)   Wt 233 lb (105.7 kg)   BMI 33.43 kg/m²      Constitutional:       General: He is not in acute distress. Appearance: Normal appearance. Cardiovascular:      Rate and Rhythm: Normal rate and regular rhythm. Pulses: Normal pulses. Pulmonary:      Effort: Pulmonary effort is normal. No respiratory distress. Neurological:      Mental Status: He is alert and oriented to person, place, and time. Mental status is at baseline. Musculoskeletal:  Gait:  altered    Skin: Clean and intact.   No open sores or wounds    Lymphatics: No palpable lymph nodes    Spine / Hip Exam:      RIGHT  LEFT    Lumbar Spine Exam  [x] All Neg    [x] All Neg     Straight leg raise  []  []Not tested   []  []Not tested    Clonus  []  []Not tested   []  []Not tested    Pain with motion  []  []Not tested   []  []Not tested    Radiculopathy  []  []Not tested   []  []Not tested    Paraspinal muscle tenderness  [] Paraspinal  []Midline   [] Paraspinal  []Midline   Sensation RIGHT  LEFT    L3  [x] Normal []Decreased    [x] Normal []Decreased   L4 [x] Normal  []Decreased   [x] Normal []Decreased   L5  [x] Normal []Decreased   [x] Normal []Decreased   S1  [x] Normal  []Decreased   [x] Normal []Decreased   Pelvis       Scoliosis  [x] Nml  [] Present     Leg-length discrepency  [x] Equal  [] Right longer   [] Left longer   Range of Motion Active Passive Active Passive   Hip Flexion 100  120    Abduction 30  50    External Rotation @ 90 flex 25  65    Internal Rotation @ 90 flex 0  20           Hip Impingement / Dysplasia  [] All Neg  [] Not tested   [x] All Neg  [] Not tested    Hip impingement test  [x]  []Not tested   []  []Not tested    C-sign  [x]  []Not tested   []  []Not tested    Anterior instability apprehension  []  []Not tested   []  []Not tested    Posterior instability apprehension  []  []Not tested   []  []Not tested    Uncontained Internal rotation  []  []Not tested  []  []Not tested          Abductors  [x] All Neg  [] Not tested   [x] All Neg  [] Not tested    Medius strength  []  []Not tested   []  []Not tested    Minimum strength  []  []Not tested   []  []Not tested    IT band tendonitis  []  []Not tested   []  []Not tested    Trochanteric tenderness  []  []Not tested  []  []Not tested   Sciatic neuropathic pain  []  []Not tested   []  []Not tested           Post-arthroplasty  [] All Neg  [] Not tested   [] All Neg  [] Not tested    Rectus tendonitis  []  []Not tested   []  []Not tested    Iliopsoas tendonitis       Start-up pain  []  []Not tested   []  []Not tested      Physical exam right knee: Physical exam of the knee demonstrates painful range of motion 5-110. Tenderness over the medial joint line. No gross instability to either varus or valgus stress test.    Imaging    Right Hip: [address] [facility] [place]  Previous Radiographs: X-rays were ordered today and reviewed of the right hip.  3 views. AP pelvis, lateral, and false profile.   They demonstrate advanced bone-on-bone osteoarthritis right hip with complete collapse of the joint surface. He likely has avascular necrotic changes as well. Previous X-rays were also ordered today and reviewed of the right knee. 4 views. Standing AP, standing AP flexed, lateral, and skyline views. They demonstrate moderate lateral joint space thinning with osteophyte formation. No evidence of fractures or dislocations. Procedure:  Orders Placed This Encounter   Procedures    Culture, Urine     Standing Status:   Future     Standing Expiration Date:   12/8/2023     Order Specific Question:   Specify (ex-cath, midstream, cysto, etc)? Answer:   midstream    Culture, MRSA, Screening     Standing Status:   Future     Standing Expiration Date:   12/8/2023    Urinalysis     Standing Status:   Future     Standing Expiration Date:   12/8/2023    CBC with Auto Differential     Standing Status:   Future     Standing Expiration Date:   63/3/7200    Basic Metabolic Panel     Standing Status:   Future     Standing Expiration Date:   12/8/2023    Hemoglobin A1C     Standing Status:   Future     Standing Expiration Date:   12/8/2023    Protime-INR     Standing Status:   Future     Standing Expiration Date:   12/8/2023     Order Specific Question:   Daily Coumadin Dose? Answer:   unknown    APTT     Standing Status:   Future     Standing Expiration Date:   12/8/2023     Order Specific Question:   Daily Heparin Dose?      Answer:   unknown    Albumin     Standing Status:   Future     Standing Expiration Date:   12/8/2023    Transferrin     Standing Status:   Future     Standing Expiration Date:   12/8/2023    NICOTINE AND METABOLITES Serum/Plasma     Standing Status:   Future     Standing Expiration Date:   12/8/2023    [facility]) ([facility])-[facility]     Referral Priority:   Routine     Referral Type:   Eval and Treat     Referral Reason:   Specialty Services Required     Requested Specialty:   Physical Therapist     Number of Visits Requested:   1    EKG 12 Lead     Standing Status: Future     Standing Expiration Date:   12/8/2023     Order Specific Question:   Reason for Exam?     Answer:   Pre-op    Type and Screen     Standing Status:   Future     Standing Expiration Date:   12/8/2023           Assessment and Plan  Narinder Thayer was seen today for knee pain. Diagnoses and all orders for this visit:    Preop testing  -     Urinalysis; Future  -     Culture, Urine; Future  -     CBC with Auto Differential; Future  -     Basic Metabolic Panel; Future  -     Hemoglobin A1C; Future  -     Protime-INR; Future  -     APTT; Future  -     Albumin; Future  -     Transferrin; Future  -     Culture, MRSA, Screening; Future  -     EKG 12 Lead; Future  -     Type and Screen; Future  -     NICOTINE AND METABOLITES Serum/Plasma; Future    Osteoarthritis of one hip, right  -     Urinalysis; Future  -     Culture, Urine; Future  -     CBC with Auto Differential; Future  -     Basic Metabolic Panel; Future  -     Hemoglobin A1C; Future  -     Protime-INR; Future  -     APTT; Future  -     Albumin; Future  -     Transferrin; Future  -     Culture, MRSA, Screening; Future  -     EKG 12 Lead; Future  -     Type and Screen; Future  -     NICOTINE AND METABOLITES Serum/Plasma; Future  -     [facility] (Ortho & [facility])-[facility]        Patient is suffering with right knee osteoarthritis of moderate severity and advanced right hip osteoarthritis. Patient will proceed with a right anterior total hip arthroplasty. He will proceed with preoperative laboratory evaluation plus the addition of a nicotine test.    I discussed with Maritza Mendez that his history, symptoms, signs, and imaging are most consistent with hip arthritis    We reviewed the natural history of these conditions and treatment options ranging from conservative measures (rest, icing, activity modification, physical therapy, pain meds, cortisone injection)  to surgical options.      We had a long discussion with the patient about their hip. We discussed surgical and non surgical options for hip arthritis. The most important thing is to work to maintain their range of motion. Next they can try medications including tylenol and NSAIDs. They can try glucosamine or chondroitin. They should also ice frequently and avoid activities that make their hip hurt. Cortisone injections and Synvisc injections are also options when medicine has failed. We finally discussed surgical options including arthroscopic debridement versus hip replacement. Often the arthritis is too far gone for an arthroscopic debridement and pain relief will be short term. Their ultimate solution will be a hip replacement when they are ready for it. They should put it off until they can no longer stand the pain and when nothing else has worked. Conservative measures have failed. He is not interested in cortisone injections. I think he is an appropriate candidate for surgery due to his ongoing symptoms and dysfunction despite conservative measures. The procedure would be Right anterior  [id number] Total Hip Arthroplasty    Perioperative considerations include: Pre-operative clearance from medical subspecialty. We reviewed the risks, benefits, alternatives of this approach. We discussed risks including, but not limited to, bleeding, pain, infection, scarring, damage to the neurovascular structures, blood clots, pulmonary embolus, stiffness, implant instability or loosening, implant failure, incomplete relief of pain, and incomplete return of function. I discussed with him that his infection risk is higher than normal following right hip replacement considering the degree of collapse present in the hip. Although the most likely diagnosis is avascular necrosis, there may be a concomitant element of secondary infection especially considering his nicotine history. I do not have enough of an index of suspicion to necessarily aspirate his hip however.   We should consider extended course 10-day p.o. antibiotic treatment following his surgery. We also reviewed the surgical details, expected recovery, and rehabilitation (6-9 months). He expressed understanding and will undergo preoperative medical evaluation and optimization. Electronically signed by Jakob Barraza MD on 12/8/2022 at 10:07 AM  This dictation was generated by voice recognition computer software. Although all attempts are made to edit the dictation for accuracy, there may be errors in the transcription that are not intended.

## 2022-12-09 ENCOUNTER — OFFICE VISIT (OUTPATIENT)
Dept: FAMILY MEDICINE CLINIC | Age: 67
End: 2022-12-09

## 2022-12-09 VITALS
DIASTOLIC BLOOD PRESSURE: 80 MMHG | WEIGHT: 235 LBS | OXYGEN SATURATION: 97 % | BODY MASS INDEX: 33.64 KG/M2 | SYSTOLIC BLOOD PRESSURE: 128 MMHG | HEIGHT: 70 IN | HEART RATE: 69 BPM

## 2022-12-09 DIAGNOSIS — I73.9 INTERMITTENT CLAUDICATION (HCC): ICD-10-CM

## 2022-12-09 DIAGNOSIS — Z01.818 PRE-OP EXAM: Primary | ICD-10-CM

## 2022-12-09 DIAGNOSIS — Z87.891 FORMER SMOKER: ICD-10-CM

## 2022-12-09 DIAGNOSIS — K59.03 DRUG-INDUCED CONSTIPATION: ICD-10-CM

## 2022-12-09 DIAGNOSIS — K21.9 GASTROESOPHAGEAL REFLUX DISEASE WITHOUT ESOPHAGITIS: ICD-10-CM

## 2022-12-09 DIAGNOSIS — E66.9 OBESITY (BMI 30-39.9): ICD-10-CM

## 2022-12-09 DIAGNOSIS — I10 ESSENTIAL HYPERTENSION, BENIGN: ICD-10-CM

## 2022-12-09 RX ORDER — POLYETHYLENE GLYCOL 3350 17 G/17G
POWDER, FOR SOLUTION ORAL
Qty: 1530 G | Refills: 1 | Status: SHIPPED | OUTPATIENT
Start: 2022-12-09

## 2022-12-09 RX ORDER — DOCUSATE SODIUM 100 MG/1
100 CAPSULE, LIQUID FILLED ORAL 2 TIMES DAILY
Qty: 60 CAPSULE | Refills: 2 | Status: SHIPPED | OUTPATIENT
Start: 2022-12-09 | End: 2023-01-08

## 2022-12-09 NOTE — PROGRESS NOTES
[address].                             Preoperative Evaluation        Pierre Jimenez  YOB: 1955    Date of Service:  12/9/2022    Vitals:    12/09/22 1007   BP: 128/80   Site: Right Upper Arm   Position: Sitting   Cuff Size: Large Adult   Pulse: 69   SpO2: 97%   Weight: 235 lb (106.6 kg)   Height: 5' 10\" (1.778 m)      Wt Readings from Last 2 Encounters:   12/09/22 235 lb (106.6 kg)   12/08/22 233 lb (105.7 kg)     BP Readings from Last 3 Encounters:   12/09/22 128/80   12/05/22 120/80   11/28/22 132/66        Chief Complaint   Patient presents with    Pre-op Exam     Pt is here for pre op for hernia surgery with Dr Henok Fischer on 12/19/22 at [facility] surgery [facility] in [facility].       Allergies   Allergen Reactions    Suprax [Cefixime] Itching     Swollen hands and itching     Outpatient Medications Marked as Taking for the 12/9/22 encounter (Office Visit) with Michale Mcardle, APRN - CNP   Medication Sig Dispense Refill    docusate sodium (COLACE) 100 MG capsule Take 1 capsule by mouth 2 times daily 60 capsule 2    polyethylene glycol (GLYCOLAX) 17 GM/SCOOP powder Place 17 g in glass of water or coffee 1-2 times a day as needed for constipation 1530 g 1    mupirocin (BACTROBAN) 2 % ointment Apply twice daily to each nare for 5 days prior to surgical procedure 1 g 0    simvastatin (ZOCOR) 20 MG tablet TAKE 1 TABLET AT BEDTIME 90 tablet 3    omeprazole (PRILOSEC) 40 MG delayed release capsule Take 1 capsule by mouth every morning (before breakfast) 90 capsule 0    Vitamin D (CHOLECALCIFEROL) 25 MCG (1000 UT) TABS tablet TAKE 2 TABLETS BY MOUTH DAILY 200 tablet 0    diclofenac (VOLTAREN) 75 MG EC tablet TAKE 1 TABLET BY MOUTH 2 TIMES DAILY 60 tablet 0    enalapril (VASOTEC) 20 MG tablet TAKE 1 TABLET BY MOUTH 2 TIMES DAILY 60 tablet 0    Omega-3 Fatty Acids (FISH OIL) 1000 MG capsule TAKE 6 CAPSULES TWICE DAILY 300 capsule 0    HYDROcodone-acetaminophen (NORCO) 5-325 MG per tablet Take 1 tablet by mouth 2 times daily as needed for Pain for up to 30 days. 60 tablet 0    [START ON 12/14/2022] HYDROcodone-acetaminophen (NORCO) 5-325 MG per tablet Take 1 tablet by mouth 2 times daily as needed for Pain for up to 30 days. 60 tablet 0    niacin (NIASPAN) 1000 MG extended release tablet TAKE ONE TABLET BY MOUTH NIGHTLY 90 tablet 0    Ferrous Sulfate (IRON) 325 (65 Fe) MG TABS TAKE ONE TABLET BY MOUTH DAILY 100 tablet 5    Biotin 1000 MCG TABS Take 1 tablet by mouth daily       allopurinol (ZYLOPRIM) 100 MG tablet Take 200 mg by mouth daily       Misc Natural Products (OSTEO BI-FLEX TRIPLE STRENGTH PO) Take 2 capsules by mouth daily. This patient presents to the office today for a preoperative consultation at the request of surgeon, Dr. Sven Estrada, who plans on performing open repair of incarcerated epigastric hernia with mesh on December 19 at [facility]. The current problem began 6 months ago, and symptoms have been worsening with time. Conservative therapy: Yes: Previous ventral hernia repair, which has been not very effective. .    Planned anesthesia: General   Known anesthesia problems: One occurrence of prolonged emergence of anesthesia when he was 17  Bleeding risk: No recent or remote history of abnormal bleeding  Personal or FH of DVT/PE: No    Patient objection to receiving blood products: Patient is okay with blood and blood products if necessary     Patient Active Problem List   Diagnosis    Vitamin D deficiency    ED (erectile dysfunction)    Essential hypertension, benign    Hyperglycemia    Hyperuricemia    left TSR    Severe obstructive sleep apnea    Gastroesophageal reflux disease without esophagitis    Dyspepsia and disorder of function of stomach    Renal colic on right side    Idiopathic chronic gout of foot without tophus    Mixed hyperlipidemia    Primary osteoarthritis involving multiple joints Diverticulosis of large intestine without hemorrhage    Obesity (BMI 30-39. 9)    Umbilical hernia without obstruction and without gangrene    Primary insomnia    Lower urinary tract symptoms    Malignant neoplasm of prostate (HCC)    History of prostate cancer    Kidney stones    Status post prostatectomy    Incisional hernia    Blurred vision, bilateral    Intermittent claudication (HCC)    Impingement syndrome of both shoulders    Drug-induced constipation       Past Medical History:   Diagnosis Date    Arthritis     CTS (carpal tunnel syndrome)     Erectile dysfunction     GERD (gastroesophageal reflux disease)     Hyperglycemia     Hyperlipidemia     Hypertension     Kidney stone 09/2016    Malignant neoplasm of prostate ([facility] [address] 4/19/2021    AJ treated with BiPAP     Osteoarthritis     Trigger thumb     Vitamin D deficiency      Past Surgical History:   Procedure Laterality Date    APPENDECTOMY      BACK SURGERY      CARPAL TUNNEL RELEASE  11/12    right    COLONOSCOPY  08/14/2018    diverticulosis    CYSTOSCOPY Right 09/28/2016     RIGHT URETEROSCOPY , RIGHT STENT PLACEMENT    HEMORRHOID SURGERY      JOINT REPLACEMENT      right shoulder replacement    OTHER SURGICAL HISTORY  10/12/2016    CYSTOSCOPY, DIAGNOSTIC RIGHT URETEROSCOPY, RIGHT RETROGRADE    DE COLONOSCOPY FLX DX W/COLLJ SPEC WHEN PFRMD N/A 8/15/2018    COLONOSCOPY performed by Saundra Alanis MD at [address] Left 07/02/13    left total shoulder replacement     Family History   Problem Relation Age of Onset    Heart Disease Mother     High Blood Pressure Mother     Arthritis Mother     Heart Disease Father     Arthritis Father      Social History     Socioeconomic History    Marital status:      Spouse name: Not on file    Number of children: Not on file    Years of education: Not on file    Highest education level: Not on file   Occupational History    Not on file   Tobacco Use Smoking status: Former     Packs/day: 1.00     Years: 30.00     Pack years: 30.00     Types: Cigarettes     Start date: 1989     Quit date: 2022     Years since quittin.0    Smokeless tobacco: Never   Substance and Sexual Activity    Alcohol use: Yes     Alcohol/week: 0.0 standard drinks     Comment: occasionally -2 x month    Drug use: No    Sexual activity: Yes     Partners: Female   Other Topics Concern    Not on file   Social History Narrative    Not on file     Social Determinants of Health     Financial Resource Strain: Low Risk     Difficulty of Paying Living Expenses: Not hard at all   Food Insecurity: No Food Insecurity    Worried About Running Out of Food in the Last Year: Never true    Ran Out of Food in the Last Year: Never true   Transportation Needs: Not on file   Physical Activity: Insufficiently Active    Days of Exercise per Week: 1 day    Minutes of Exercise per Session: 10 min   Stress: Not on file   Social Connections: Not on file   Intimate Partner Violence: Not At Risk    Fear of Current or Ex-Partner: No    Emotionally Abused: No    Physically Abused: No    Sexually Abused: No   Housing Stability: Not on file       Review of Systems  A comprehensive review of systems was negative except for: right hip pain     Physical Exam   Constitutional: He is oriented to person, place, and time. He appears well-developed and well-nourished. No distress. HENT:   Head: Normocephalic and atraumatic. Mouth/Throat: Uvula is midline, oropharynx is clear and moist and mucous membranes are normal.   Eyes: Conjunctivae and EOM are normal. Pupils are equal, round, and reactive to light. Neck: Trachea normal and normal range of motion. Neck supple. No JVD present. Carotid bruit is not present. No mass and no thyromegaly present. Cardiovascular: Normal rate, regular rhythm, normal heart sounds and intact distal pulses. Exam reveals no gallop and no friction rub.   No murmur heard.  Pulmonary/Chest: Effort normal and breath sounds normal. No respiratory distress. He has no wheezes. He has no rales. Abdominal: Soft. Normal aorta and bowel sounds are normal. He exhibits no distension and no mass. There is no hepatosplenomegaly. No tenderness. Musculoskeletal: He exhibits no edema and no tenderness. Neurological: He is alert and oriented to person, place, and time. He has normal strength. No cranial nerve deficit or sensory deficit. Coordination and gait normal.   Skin: Skin is warm and dry. No rash noted. No erythema. Psychiatric: He has a normal mood and affect. His behavior is normal.     EKG Interpretation:  normal sinus rhythm.     Lab Review   Office Visit on 11/28/2022   Component Date Value    Rejected Test 11/29/2022 CXWND     Reason for Rejection 11/29/2022 see below    Orders Only on 10/26/2022   Component Date Value    Iron 10/25/2022 65     TIBC 10/25/2022 225 (A)     Iron Saturation 10/25/2022 29     Ferritin 10/25/2022 419.3 (A)    Office Visit on 10/25/2022   Component Date Value    Cholesterol, Total 10/25/2022 122     Triglycerides 10/25/2022 95     HDL 10/25/2022 34 (A)     LDL Calculated 10/25/2022 69     VLDL Cholesterol Calcula* 10/25/2022 19     WBC 10/25/2022 6.4     RBC 10/25/2022 3.54 (A)     Hemoglobin 10/25/2022 11.8 (A)     Hematocrit 10/25/2022 34.6 (A)     MCV 10/25/2022 97.7     MCH 10/25/2022 33.2     MCHC 10/25/2022 34.0     RDW 10/25/2022 14.2     Platelets 04/43/1516 233     MPV 10/25/2022 8.6     Neutrophils % 10/25/2022 67.0     Lymphocytes % 10/25/2022 21.3     Monocytes % 10/25/2022 8.1     Eosinophils % 10/25/2022 2.9     Basophils % 10/25/2022 0.7     Neutrophils Absolute 10/25/2022 4.3     Lymphocytes Absolute 10/25/2022 1.4     Monocytes Absolute 10/25/2022 0.5     Eosinophils Absolute 10/25/2022 0.2     Basophils Absolute 10/25/2022 0.0     Sodium 10/25/2022 143     Potassium 10/25/2022 4.5     Chloride 10/25/2022 105     CO2 10/25/2022 24     Anion Gap 10/25/2022 14     Glucose 10/25/2022 109 (A)     BUN 10/25/2022 17     Creatinine 10/25/2022 1.0     Est, Glom Filt Rate 10/25/2022 >60     Calcium 10/25/2022 9.4     Vit D, 25-Hydroxy 10/25/2022 53.1    Hospital Outpatient Visit on 09/10/2022   Component Date Value    PSA 09/10/2022 <0.01    Hospital Outpatient Visit on 07/15/2022   Component Date Value    WBC 07/15/2022 6.4     RBC 07/15/2022 3.85 (A)     Hemoglobin 07/15/2022 12.6 (A)     Hematocrit 07/15/2022 36.2 (A)     MCV 07/15/2022 94.1     MCH 07/15/2022 32.8     MCHC 07/15/2022 34.9     RDW 07/15/2022 13.8     Platelets 94/84/1534 215     MPV 07/15/2022 8.3     Neutrophils % 07/15/2022 64.6     Lymphocytes % 07/15/2022 22.4     Monocytes % 07/15/2022 9.4     Eosinophils % 07/15/2022 3.0     Basophils % 07/15/2022 0.6     Neutrophils Absolute 07/15/2022 4.1     Lymphocytes Absolute 07/15/2022 1.4     Monocytes Absolute 07/15/2022 0.6     Eosinophils Absolute 07/15/2022 0.2     Basophils Absolute 07/15/2022 0.0     BUN 07/15/2022 24 (A)     Creatinine 07/15/2022 1.1     GFR Non- 07/15/2022 >60     GFR  07/15/2022 >60            Assessment:       78 y.o. patient with planned surgery as above. Known risk factors for perioperative complications: Obesity, former tobacco abuse, HTN, intermittent claudication, GERD, AJ   Current medications which may produce withdrawal symptoms if withheld perioperatively: Norco     1. Pre-op exam    2. Gastroesophageal reflux disease without esophagitis    3. Essential hypertension, benign    4. Drug-induced constipation    5. Former smoker    6. Intermittent claudication (HCC)    7. Obesity (BMI 30-39. 9)         Plan:     1. Preoperative workup as follows: ECG, CBC, CMP  2. Further recommendations from consultants:No    3. Change in medication regimen before surgery: Take Omeprazole on morning of surgery with sip of water, and hold all other medications until after surgery. Stop NSAIDS (Motrin, Aleve, Ibuprofen, Diclofenac), vitamin E, aspirin, fish oil 7-10 days prior to surgery unless instructed otherwise by surgeon. 4. Prophylaxis for cardiac events with perioperative beta-blockers: Not indicated  ACC/AHA indications for pre-operative beta-blocker use:    Vascular surgery with history of postitive stress test  Intermediate or high risk surgery with history of CAD   Intermediate or high risk surgery with multiple clinical predictors of CAD- 2 of the following: history of compensated or prior heart failure, history of cerebrovascular disease, DM, or renal insufficiency    Routine administration of higher-dose, long-acting metoprolol in beta-blocker-naïve patients on the day of surgery, and in the absence of dose titration is associated with an overall increase in mortality. Beta-blockers should be started days to weeks prior to surgery and titrated to pulse < 70.  5. Deep vein thrombosis prophylaxis: regimen to be chosen by surgical team  6. No contraindications to planned surgery pending appropriate labs       If you have questions, please do not hesitate to call me ([phone]).      Sincerely,    Kameron Me, DNP, APRN, FNP-BC

## 2022-12-10 LAB
A/G RATIO: 1.6 (ref 1.1–2.2)
ALBUMIN SERPL-MCNC: 4.3 G/DL (ref 3.4–5)
ALP BLD-CCNC: 113 U/L (ref 40–129)
ALT SERPL-CCNC: 25 U/L (ref 10–40)
ANION GAP SERPL CALCULATED.3IONS-SCNC: 10 MMOL/L (ref 3–16)
AST SERPL-CCNC: 20 U/L (ref 15–37)
BASOPHILS ABSOLUTE: 0 K/UL (ref 0–0.2)
BASOPHILS RELATIVE PERCENT: 0.5 %
BILIRUB SERPL-MCNC: <0.2 MG/DL (ref 0–1)
BUN BLDV-MCNC: 20 MG/DL (ref 7–20)
CALCIUM SERPL-MCNC: 10 MG/DL (ref 8.3–10.6)
CHLORIDE BLD-SCNC: 98 MMOL/L (ref 99–110)
CO2: 27 MMOL/L (ref 21–32)
CREAT SERPL-MCNC: 1 MG/DL (ref 0.8–1.3)
EOSINOPHILS ABSOLUTE: 0.1 K/UL (ref 0–0.6)
EOSINOPHILS RELATIVE PERCENT: 1.4 %
GFR SERPL CREATININE-BSD FRML MDRD: >60 ML/MIN/{1.73_M2}
GLUCOSE BLD-MCNC: 95 MG/DL (ref 70–99)
HCT VFR BLD CALC: 38.4 % (ref 40.5–52.5)
HEMOGLOBIN: 12.7 G/DL (ref 13.5–17.5)
LYMPHOCYTES ABSOLUTE: 1.8 K/UL (ref 1–5.1)
LYMPHOCYTES RELATIVE PERCENT: 22.1 %
MCH RBC QN AUTO: 31.8 PG (ref 26–34)
MCHC RBC AUTO-ENTMCNC: 33 G/DL (ref 31–36)
MCV RBC AUTO: 96.5 FL (ref 80–100)
MONOCYTES ABSOLUTE: 0.6 K/UL (ref 0–1.3)
MONOCYTES RELATIVE PERCENT: 6.8 %
NEUTROPHILS ABSOLUTE: 5.6 K/UL (ref 1.7–7.7)
NEUTROPHILS RELATIVE PERCENT: 69.2 %
PDW BLD-RTO: 13.6 % (ref 12.4–15.4)
PLATELET # BLD: 254 K/UL (ref 135–450)
PMV BLD AUTO: 9.1 FL (ref 5–10.5)
POTASSIUM SERPL-SCNC: 4.9 MMOL/L (ref 3.5–5.1)
RBC # BLD: 3.98 M/UL (ref 4.2–5.9)
SODIUM BLD-SCNC: 135 MMOL/L (ref 136–145)
TOTAL PROTEIN: 7 G/DL (ref 6.4–8.2)
WBC # BLD: 8.1 K/UL (ref 4–11)

## 2022-12-12 ENCOUNTER — OFFICE VISIT (OUTPATIENT)
Dept: PULMONOLOGY | Age: 67
End: 2022-12-12
Payer: MEDICARE

## 2022-12-12 ENCOUNTER — TELEPHONE (OUTPATIENT)
Dept: PULMONOLOGY | Age: 67
End: 2022-12-12

## 2022-12-12 VITALS
RESPIRATION RATE: 16 BRPM | DIASTOLIC BLOOD PRESSURE: 80 MMHG | BODY MASS INDEX: 32.64 KG/M2 | WEIGHT: 228 LBS | OXYGEN SATURATION: 98 % | SYSTOLIC BLOOD PRESSURE: 128 MMHG | HEART RATE: 80 BPM | HEIGHT: 70 IN

## 2022-12-12 DIAGNOSIS — G47.33 SEVERE OBSTRUCTIVE SLEEP APNEA: Primary | ICD-10-CM

## 2022-12-12 DIAGNOSIS — I10 ESSENTIAL HYPERTENSION, BENIGN: ICD-10-CM

## 2022-12-12 DIAGNOSIS — R53.83 OTHER FATIGUE: ICD-10-CM

## 2022-12-12 DIAGNOSIS — E66.9 OBESITY (BMI 30-39.9): ICD-10-CM

## 2022-12-12 DIAGNOSIS — Z71.89 ENCOUNTER FOR BIPAP USE COUNSELING: ICD-10-CM

## 2022-12-12 DIAGNOSIS — G47.09 OTHER INSOMNIA: ICD-10-CM

## 2022-12-12 PROCEDURE — G8484 FLU IMMUNIZE NO ADMIN: HCPCS | Performed by: NURSE PRACTITIONER

## 2022-12-12 PROCEDURE — 3074F SYST BP LT 130 MM HG: CPT | Performed by: NURSE PRACTITIONER

## 2022-12-12 PROCEDURE — 3017F COLORECTAL CA SCREEN DOC REV: CPT | Performed by: NURSE PRACTITIONER

## 2022-12-12 PROCEDURE — G8427 DOCREV CUR MEDS BY ELIG CLIN: HCPCS | Performed by: NURSE PRACTITIONER

## 2022-12-12 PROCEDURE — 99214 OFFICE O/P EST MOD 30 MIN: CPT | Performed by: NURSE PRACTITIONER

## 2022-12-12 PROCEDURE — 1036F TOBACCO NON-USER: CPT | Performed by: NURSE PRACTITIONER

## 2022-12-12 PROCEDURE — G8417 CALC BMI ABV UP PARAM F/U: HCPCS | Performed by: NURSE PRACTITIONER

## 2022-12-12 PROCEDURE — 3078F DIAST BP <80 MM HG: CPT | Performed by: NURSE PRACTITIONER

## 2022-12-12 PROCEDURE — 1123F ACP DISCUSS/DSCN MKR DOCD: CPT | Performed by: NURSE PRACTITIONER

## 2022-12-12 ASSESSMENT — SLEEP AND FATIGUE QUESTIONNAIRES
HOW LIKELY ARE YOU TO NOD OFF OR FALL ASLEEP WHILE SITTING AND TALKING TO SOMEONE: 0
HOW LIKELY ARE YOU TO NOD OFF OR FALL ASLEEP WHILE WATCHING TV: 3
HOW LIKELY ARE YOU TO NOD OFF OR FALL ASLEEP WHILE SITTING AND READING: 1
HOW LIKELY ARE YOU TO NOD OFF OR FALL ASLEEP WHILE SITTING QUIETLY AFTER LUNCH WITHOUT ALCOHOL: 1
HOW LIKELY ARE YOU TO NOD OFF OR FALL ASLEEP IN A CAR, WHILE STOPPED FOR A FEW MINUTES IN TRAFFIC: 0
HOW LIKELY ARE YOU TO NOD OFF OR FALL ASLEEP WHILE SITTING INACTIVE IN A PUBLIC PLACE: 1
ESS TOTAL SCORE: 10
HOW LIKELY ARE YOU TO NOD OFF OR FALL ASLEEP WHILE SITTING AND READING: 1
HOW LIKELY ARE YOU TO NOD OFF OR FALL ASLEEP WHEN YOU ARE A PASSENGER IN A CAR FOR AN HOUR WITHOUT A BREAK: 3
HOW LIKELY ARE YOU TO NOD OFF OR FALL ASLEEP WHILE SITTING INACTIVE IN A PUBLIC PLACE: 2
HOW LIKELY ARE YOU TO NOD OFF OR FALL ASLEEP WHILE LYING DOWN TO REST IN THE AFTERNOON WHEN CIRCUMSTANCES PERMIT: 3
ESS TOTAL SCORE: 16
HOW LIKELY ARE YOU TO NOD OFF OR FALL ASLEEP WHILE SITTING AND TALKING TO SOMEONE: 0
HOW LIKELY ARE YOU TO NOD OFF OR FALL ASLEEP WHILE LYING DOWN TO REST IN THE AFTERNOON WHEN CIRCUMSTANCES PERMIT: 2
HOW LIKELY ARE YOU TO NOD OFF OR FALL ASLEEP WHILE SITTING QUIETLY AFTER LUNCH WITHOUT ALCOHOL: 1
NECK CIRCUMFERENCE (INCHES): 18.5
HOW LIKELY ARE YOU TO NOD OFF OR FALL ASLEEP WHEN YOU ARE A PASSENGER IN A CAR FOR AN HOUR WITHOUT A BREAK: 3
HOW LIKELY ARE YOU TO NOD OFF OR FALL ASLEEP WHILE WATCHING TV: 2
HOW LIKELY ARE YOU TO NOD OFF OR FALL ASLEEP IN A CAR, WHILE STOPPED FOR A FEW MINUTES IN TRAFFIC: 3

## 2022-12-12 NOTE — PATIENT INSTRUCTIONS

## 2022-12-12 NOTE — PROGRESS NOTES
Patient ID: Carolyn Garza is a 78 y.o. male who is being seen today for   Chief Complaint   Patient presents with    Follow-up    Sleep Apnea     Not sleeping well, waking up multiple times through out the night, x several months. Extremely tired. HPI:     Carolyn Garza is a 78 y.o. male in office for AJ follow up. States he is doing okay with BIPAP. Patient is using BiPAP 7 hrs/night. Not using humidifier. Occasional snoring on BiPAP. The pressure is well tolerated. The mask is comfortable- nasal mask. No mask leak. +daytime sleepiness. States seeing [facility] for anemia. Also taking norco about 2 times a day for hip pain. Denies nodding off when driving. No dry nose or throat. No fatigue. Bedtime is 1030 pm and rise time is 6 am. Sleep onset is 3 minutes. Wakes up 2-3 times at night total. 2-3 nocturia. It takes usually few minutes to fall back a sleep, states can be longer if thinking about things or if hip is hurting states is getting hip replacement Jan 17. Occasional naps during the day- 10 min. No headache in am. No weight gain. 2 caffienated beverages during the day, states none after 2 pm. Occasional alcohol. ESS is 10- discussed with patient. Previous HPI 12/6/21  Carolyn Garza is a 78 y.o. male in office for AJ follow up. States he is doing okay with BIPAP. States pressure feels too low initially- ramp is on and start is set at 4. Patient is using BiPAP 6-7 hrs/night. Not using humidifier. No snoring on BiPAP. The mask is comfortable-nasal mask. Minimal mask leak. No significant daytime sleepiness. No nodding off when driving. No dry nose or throat. Some fatigue. Bedtime is 1030 pm and rise time is 6 am. Sleep onset is usually 2 minutes. Wakes up 0-1 times at night total. 0-1 nocturia. It takes usually few minutes to fall back a sleep. Occasional naps during the day. No headache in am. No weight gain. 1-3 caffienated beverages during the day. Occasional alcohol.  ESS is 5.        Sleep Medicine 12/12/2022 12/12/2022 12/6/2021 12/4/2020 12/2/2019 11/30/2018 11/30/2018   Sitting and reading 1 1 0 1 2 0 1   Watching TV 2 3 1 1 3 2 3   Sitting, inactive in a public place (e.g. a theatre or a meeting) 1 2 1 0 1 1 1   As a passenger in a car for an hour without a break 3 3 1 1 3 2 3   Lying down to rest in the afternoon when circumstances permit 2 3 1 1 0 1 1   Sitting and talking to someone 0 0 0 0 0 0 0   Sitting quietly after a lunch without alcohol 1 1 1 1 3 1 1   In a car, while stopped for a few minutes in traffic 0 3 0 0 0 1 2   [place] Sleepiness Score 10 16 5 5 12 8 12   Neck circumference (Inches) - 18.5 19 - 20 - 19       Past Medical History:  Past Medical History:   Diagnosis Date    Arthritis     CTS (carpal tunnel syndrome)     Erectile dysfunction     GERD (gastroesophageal reflux disease)     Hyperglycemia     Hyperlipidemia     Hypertension     Kidney stone 09/2016    Malignant neoplasm of prostate ([facility] [address] 4/19/2021    AJ treated with BiPAP     Osteoarthritis     Trigger thumb     Vitamin D deficiency        Past Surgical History:        Procedure Laterality Date    APPENDECTOMY      BACK SURGERY      CARPAL TUNNEL RELEASE  11/12    right    COLONOSCOPY  08/14/2018    diverticulosis    CYSTOSCOPY Right 09/28/2016     RIGHT URETEROSCOPY , RIGHT STENT PLACEMENT    HEMORRHOID SURGERY      JOINT REPLACEMENT      right shoulder replacement    OTHER SURGICAL HISTORY  10/12/2016    CYSTOSCOPY, DIAGNOSTIC RIGHT URETEROSCOPY, RIGHT RETROGRADE    AL COLONOSCOPY FLX DX W/COLLJ SPEC WHEN PFRMD N/A 8/15/2018    COLONOSCOPY performed by Fidelina March MD at [address] [facility] Left 07/02/13    left total shoulder replacement       Allergies:  is allergic to suprax [cefixime]. Social History:    TOBACCO:   reports that he quit smoking about 4 weeks ago. His smoking use included cigarettes. He started smoking about 33 years ago.  He has a 30.00 pack-year smoking history. He has never used smokeless tobacco.  ETOH:   reports current alcohol use. Family History:       Problem Relation Age of Onset    Heart Disease Mother     High Blood Pressure Mother     Arthritis Mother     Heart Disease Father     Arthritis Father        Current Medications:    Current Outpatient Medications:     docusate sodium (COLACE) 100 MG capsule, Take 1 capsule by mouth 2 times daily, Disp: 60 capsule, Rfl: 2    polyethylene glycol (GLYCOLAX) 17 GM/SCOOP powder, Place 17 g in glass of water or coffee 1-2 times a day as needed for constipation, Disp: 1530 g, Rfl: 1    mupirocin (BACTROBAN) 2 % ointment, Apply twice daily to each nare for 5 days prior to surgical procedure, Disp: 1 g, Rfl: 0    simvastatin (ZOCOR) 20 MG tablet, TAKE 1 TABLET AT BEDTIME, Disp: 90 tablet, Rfl: 3    omeprazole (PRILOSEC) 40 MG delayed release capsule, Take 1 capsule by mouth every morning (before breakfast), Disp: 90 capsule, Rfl: 0    Vitamin D (CHOLECALCIFEROL) 25 MCG (1000 UT) TABS tablet, TAKE 2 TABLETS BY MOUTH DAILY, Disp: 200 tablet, Rfl: 0    diclofenac (VOLTAREN) 75 MG EC tablet, TAKE 1 TABLET BY MOUTH 2 TIMES DAILY, Disp: 60 tablet, Rfl: 0    enalapril (VASOTEC) 20 MG tablet, TAKE 1 TABLET BY MOUTH 2 TIMES DAILY, Disp: 60 tablet, Rfl: 0    Omega-3 Fatty Acids (FISH OIL) 1000 MG capsule, TAKE 6 CAPSULES TWICE DAILY, Disp: 300 capsule, Rfl: 0    HYDROcodone-acetaminophen (NORCO) 5-325 MG per tablet, Take 1 tablet by mouth 2 times daily as needed for Pain for up to 30 days. , Disp: 60 tablet, Rfl: 0    [START ON 12/14/2022] HYDROcodone-acetaminophen (NORCO) 5-325 MG per tablet, Take 1 tablet by mouth 2 times daily as needed for Pain for up to 30 days. , Disp: 60 tablet, Rfl: 0    niacin (NIASPAN) 1000 MG extended release tablet, TAKE ONE TABLET BY MOUTH NIGHTLY, Disp: 90 tablet, Rfl: 0    Ferrous Sulfate (IRON) 325 (65 Fe) MG TABS, TAKE ONE TABLET BY MOUTH DAILY, Disp: 100 tablet, Rfl: 5    Biotin 1000 MCG TABS, Take 1 tablet by mouth daily , Disp: , Rfl:     allopurinol (ZYLOPRIM) 100 MG tablet, Take 200 mg by mouth daily , Disp: , Rfl:     Misc Natural Products (OSTEO BI-FLEX TRIPLE STRENGTH PO), Take 2 capsules by mouth daily. , Disp: , Rfl:       Objective:   PHYSICAL EXAM:    /80   Pulse 80   Resp 16   Ht 5' 10\" (1.778 m)   Wt 228 lb (103.4 kg)   SpO2 98%   BMI 32.71 kg/m²     Physical exam:   Gen: No acute distress. Obese. BMI of 32.71  Eyes: PERRL. No sclera icterus. No conjunctival injection. ENT: No discharge. Neck: Trachea midline. No obvious mass. Neck circumference 18.5\"  Resp: No accessory muscle use. No crackles. No wheezes. No rhonchi. CV: Regular rate. Regular rhythm. No murmur or rub. Skin: Warm and dry. M/S: No cyanosis. No obvious joint deformity. Neuro: Awake. Alert. Moves all four extremities. Psych: Oriented x 3. No anxiety. DATA:   2/17/2014 HST AHI 61, low SPO2 50%  3/14/2014 Pap titration controlled sleep-related breathing with BiPAP, PLMS 75, recommendations BiPAP 17/13 cm H2O    BIPAP compliance data:  Compliance download report from 11/1/17 to 11/30/17 reviewed today by me and showed patient is using machine 7:22 hrs/night with 100% compliance and AHI 4.1 within this time frame. 30/30days with greater than 4 hours of machine use. BIPAP 17/13 cm H20    Compliance download report from 10/31/18 to 11/29/18 reviewed today by me and showed patient is using machine 6:52 hrs/night with 100% compliance and AHI 5.7 within this time frame. 30/30days with greater than 4 hours of machine use. BIPAP 17/13 cm H20    BiPAP compliance report from 12/1/18-12/30/18 on BiPAP 18/14 cm H2O reviewed. Compliance is good 100%. AHI is improving still slightly elevated 5.2. BiPAP compliance report from 1/8/19-2/6/19 on BiPAP 19/15 cm H2O reviewed.   Compliance is good 100% AHI is good 3.9    Compliance download report from 11/2/19 to 12/1/19 reviewed today by me and showed patient is using machine 7:26 hrs/night with 100% compliance and AHI 4.1 within this time frame. 30/30days with greater than 4 hours of machine use. BIPAP 19/15 cm H20    12/4/2020-unable to obtain BiPAP download report. Patient has older BiPAP with no modem    BiPAP compliance report from 11/4/2020-12/3/2020 on BiPAP 19/15 cm H2O reviewed. Compliance is great 100%. AHI is good 3.2. New BIPAP:  Compliance download report from 11/3/21 to 12/2/21 reviewed today by me and showed patient is using machine 7:37 hrs/night with 100% compliance and AHI 2.9 within this time frame. 30/30days with greater than 4 hours of machine use. BIPAP 19/15 cm H20     Compliance download report from 11/7/22 to 12/6/22 reviewed today by me and showed patient is using machine 7:23 hrs/night with 100% compliance and AHI 4.8 within this time frame. 30/30days with greater than 4 hours of machine use. BIPAP 19/15 cm H20     Assessment:      Severe AJ. BIPAP 19/15 cm H2O. Optimal compliance and efficacy on review today. Fatigue- anemia, medications likely contributing  Obesity  Sleep maintenance insomnia-psychophysiological hyperarousal, pain likely contributing    Plan:       - Changed to BIPAP 20/16cm H2O.  -Follow AHI and adjust pressure if needed  - Patient to call DME if he would like to try different mask. - Advised to use BiPAP 6-8 hrs at night and during naps. - Replacement of mask, tubing, head straps every 3-6 months or sooner if damaged.    - Patient instructed to contact DME company for any mask, tubing or machine trouble shooting if problems arise.  - Sleep hygiene  Cognitive behavioral therapy was discussed with patient including stimulus control and sleep restriction   -Continue to work with PCP/specialist for pain as this is likely contributing to poor sleep (patient does have hip replacement surgery scheduled)  -Discussed causes of fatigue can be multifactorial.  Patient states he has anemia also has recently been taking narcotic pain medication. These are likely contributing to fatigue and tiredness in the daytime and continue to work closely with specialists  - Avoid sedatives, alcohol and caffeinated drinks at bed time. - Patient counseled to never drive or operate heavy machinery while fatigue, drowsy or sleepy- patient verbalized understanding and agrees.    - Weight loss is recommended as a long-term intervention.    - Complications of AJ if not treated were discussed with patient patient, including: systemic hypertension, pulmonary hypertension, cardiovascular morbidities, car accidents and all cause mortality.  -Patient education provided regarding sleep tips and PAP cleaning recommendations     Follow up: 2-3 months, sooner if needed

## 2022-12-14 ENCOUNTER — TELEPHONE (OUTPATIENT)
Dept: FAMILY MEDICINE CLINIC | Age: 67
End: 2022-12-14

## 2022-12-14 NOTE — TELEPHONE ENCOUNTER
Nora Rosales with [facility] called requesting the EKG tracing. Printed documentation and faxed to [phone]. Fax confirmation attached.

## 2022-12-15 ENCOUNTER — TELEPHONE (OUTPATIENT)
Dept: ORTHOPEDIC SURGERY | Age: 67
End: 2022-12-15

## 2022-12-28 RX ORDER — ENALAPRIL MALEATE 20 MG/1
TABLET ORAL
Qty: 60 TABLET | Refills: 0 | Status: SHIPPED | OUTPATIENT
Start: 2022-12-28

## 2022-12-30 ENCOUNTER — TELEPHONE (OUTPATIENT)
Dept: ORTHOPEDIC SURGERY | Age: 67
End: 2022-12-30

## 2022-12-30 NOTE — TELEPHONE ENCOUNTER
CPT: 62724  BODY PART: right hip  STATUS: inpatient  LOCATION: [facility]  AUTHORIZATION: NPR    Medicare is primary, NPR

## 2023-01-05 ENCOUNTER — OFFICE VISIT (OUTPATIENT)
Dept: FAMILY MEDICINE CLINIC | Age: 68
End: 2023-01-05
Payer: MEDICARE

## 2023-01-05 ENCOUNTER — HOSPITAL ENCOUNTER (OUTPATIENT)
Age: 68
Discharge: HOME | End: 2023-01-05
Payer: MEDICARE

## 2023-01-05 VITALS
SYSTOLIC BLOOD PRESSURE: 122 MMHG | HEART RATE: 71 BPM | OXYGEN SATURATION: 97 % | DIASTOLIC BLOOD PRESSURE: 74 MMHG | BODY MASS INDEX: 33.36 KG/M2 | WEIGHT: 233 LBS | HEIGHT: 70 IN

## 2023-01-05 DIAGNOSIS — C61 MALIGNANT NEOPLASM OF PROSTATE (HCC): ICD-10-CM

## 2023-01-05 DIAGNOSIS — I10 ESSENTIAL HYPERTENSION, BENIGN: ICD-10-CM

## 2023-01-05 DIAGNOSIS — Z01.818 PREOP TESTING: ICD-10-CM

## 2023-01-05 DIAGNOSIS — Z01.818 PREOP EXAMINATION: Primary | ICD-10-CM

## 2023-01-05 DIAGNOSIS — M16.11 OSTEOARTHRITIS OF ONE HIP, RIGHT: ICD-10-CM

## 2023-01-05 DIAGNOSIS — E66.9 OBESITY (BMI 30-39.9): ICD-10-CM

## 2023-01-05 DIAGNOSIS — I73.9 INTERMITTENT CLAUDICATION (HCC): ICD-10-CM

## 2023-01-05 DIAGNOSIS — G47.33 SEVERE OBSTRUCTIVE SLEEP APNEA: ICD-10-CM

## 2023-01-05 DIAGNOSIS — Z87.891 FORMER SMOKER: ICD-10-CM

## 2023-01-05 LAB
ALBUMIN SERPL-MCNC: 4.3 G/DL (ref 3.4–5)
ANION GAP SERPL CALCULATED.3IONS-SCNC: 10 MMOL/L (ref 3–16)
APTT: 23.6 SEC (ref 23–34.3)
BASOPHILS ABSOLUTE: 0 K/UL (ref 0–0.2)
BASOPHILS RELATIVE PERCENT: 0.5 %
BILIRUBIN URINE: NEGATIVE
BLOOD, URINE: NEGATIVE
BUN BLDV-MCNC: 18 MG/DL (ref 7–20)
CALCIUM SERPL-MCNC: 10 MG/DL (ref 8.3–10.6)
CHLORIDE BLD-SCNC: 101 MMOL/L (ref 99–110)
CLARITY: CLEAR
CO2: 27 MMOL/L (ref 21–32)
COLOR: YELLOW
CREAT SERPL-MCNC: 1 MG/DL (ref 0.8–1.3)
EOSINOPHILS ABSOLUTE: 0.3 K/UL (ref 0–0.6)
EOSINOPHILS RELATIVE PERCENT: 4.2 %
GFR SERPL CREATININE-BSD FRML MDRD: >60 ML/MIN/{1.73_M2}
GLUCOSE BLD-MCNC: 86 MG/DL (ref 70–99)
GLUCOSE URINE: NEGATIVE MG/DL
HCT VFR BLD CALC: 36 % (ref 40.5–52.5)
HEMOGLOBIN: 12.5 G/DL (ref 13.5–17.5)
INR BLD: 0.97 (ref 0.87–1.14)
KETONES, URINE: NEGATIVE MG/DL
LEUKOCYTE ESTERASE, URINE: NEGATIVE
LYMPHOCYTES ABSOLUTE: 1.7 K/UL (ref 1–5.1)
LYMPHOCYTES RELATIVE PERCENT: 25 %
MCH RBC QN AUTO: 32.2 PG (ref 26–34)
MCHC RBC AUTO-ENTMCNC: 34.7 G/DL (ref 31–36)
MCV RBC AUTO: 92.7 FL (ref 80–100)
MICROSCOPIC EXAMINATION: NORMAL
MONOCYTES ABSOLUTE: 0.6 K/UL (ref 0–1.3)
MONOCYTES RELATIVE PERCENT: 8.5 %
NEUTROPHILS ABSOLUTE: 4.2 K/UL (ref 1.7–7.7)
NEUTROPHILS RELATIVE PERCENT: 61.8 %
NITRITE, URINE: NEGATIVE
PDW BLD-RTO: 14.1 % (ref 12.4–15.4)
PH UA: 6.5 (ref 5–8)
PLATELET # BLD: 224 K/UL (ref 135–450)
PMV BLD AUTO: 8 FL (ref 5–10.5)
POTASSIUM SERPL-SCNC: 4.5 MMOL/L (ref 3.5–5.1)
PROTEIN UA: NEGATIVE MG/DL
PROTHROMBIN TIME: 12.8 SEC (ref 11.7–14.5)
RBC # BLD: 3.89 M/UL (ref 4.2–5.9)
SODIUM BLD-SCNC: 138 MMOL/L (ref 136–145)
SPECIFIC GRAVITY UA: 1.02 (ref 1–1.03)
TRANSFERRIN: 200 MG/DL (ref 200–360)
URINE TYPE: NORMAL
UROBILINOGEN, URINE: 0.2 E.U./DL
WBC # BLD: 6.8 K/UL (ref 4–11)

## 2023-01-05 PROCEDURE — 85610 PROTHROMBIN TIME: CPT

## 2023-01-05 PROCEDURE — 3074F SYST BP LT 130 MM HG: CPT | Performed by: NURSE PRACTITIONER

## 2023-01-05 PROCEDURE — 85025 COMPLETE CBC W/AUTO DIFF WBC: CPT

## 2023-01-05 PROCEDURE — 84466 ASSAY OF TRANSFERRIN: CPT

## 2023-01-05 PROCEDURE — 1123F ACP DISCUSS/DSCN MKR DOCD: CPT | Performed by: NURSE PRACTITIONER

## 2023-01-05 PROCEDURE — 99214 OFFICE O/P EST MOD 30 MIN: CPT | Performed by: NURSE PRACTITIONER

## 2023-01-05 PROCEDURE — 83036 HEMOGLOBIN GLYCOSYLATED A1C: CPT

## 2023-01-05 PROCEDURE — 80048 BASIC METABOLIC PNL TOTAL CA: CPT

## 2023-01-05 PROCEDURE — G0480 DRUG TEST DEF 1-7 CLASSES: HCPCS

## 2023-01-05 PROCEDURE — 36415 COLL VENOUS BLD VENIPUNCTURE: CPT

## 2023-01-05 PROCEDURE — G8427 DOCREV CUR MEDS BY ELIG CLIN: HCPCS | Performed by: NURSE PRACTITIONER

## 2023-01-05 PROCEDURE — 3078F DIAST BP <80 MM HG: CPT | Performed by: NURSE PRACTITIONER

## 2023-01-05 PROCEDURE — 82040 ASSAY OF SERUM ALBUMIN: CPT

## 2023-01-05 PROCEDURE — 1036F TOBACCO NON-USER: CPT | Performed by: NURSE PRACTITIONER

## 2023-01-05 PROCEDURE — 85730 THROMBOPLASTIN TIME PARTIAL: CPT

## 2023-01-05 PROCEDURE — G8484 FLU IMMUNIZE NO ADMIN: HCPCS | Performed by: NURSE PRACTITIONER

## 2023-01-05 PROCEDURE — G8417 CALC BMI ABV UP PARAM F/U: HCPCS | Performed by: NURSE PRACTITIONER

## 2023-01-05 PROCEDURE — 3017F COLORECTAL CA SCREEN DOC REV: CPT | Performed by: NURSE PRACTITIONER

## 2023-01-05 RX ORDER — DICLOFENAC SODIUM 75 MG/1
75 TABLET, DELAYED RELEASE ORAL 2 TIMES DAILY
Qty: 60 TABLET | Refills: 0 | Status: SHIPPED | OUTPATIENT
Start: 2023-01-05

## 2023-01-05 ASSESSMENT — PATIENT HEALTH QUESTIONNAIRE - PHQ9
2. FEELING DOWN, DEPRESSED OR HOPELESS: 0
SUM OF ALL RESPONSES TO PHQ QUESTIONS 1-9: 0
SUM OF ALL RESPONSES TO PHQ9 QUESTIONS 1 & 2: 0
SUM OF ALL RESPONSES TO PHQ QUESTIONS 1-9: 0
SUM OF ALL RESPONSES TO PHQ QUESTIONS 1-9: 0
1. LITTLE INTEREST OR PLEASURE IN DOING THINGS: 0
SUM OF ALL RESPONSES TO PHQ QUESTIONS 1-9: 0

## 2023-01-05 NOTE — PATIENT INSTRUCTIONS
Take Omeprazole on morning of surgery with sip of water, and hold all other medications until after surgery. Stop NSAIDS (Motrin, Aleve, Ibuprofen, Diclofenac), vitamin E, aspirin, fish oil 7-10 days prior to surgery unless instructed otherwise by surgeon.

## 2023-01-06 LAB
ESTIMATED AVERAGE GLUCOSE: 111.2 MG/DL
HBA1C MFR BLD: 5.5 %
URINE CULTURE, ROUTINE: NORMAL

## 2023-01-07 LAB — MRSA CULTURE ONLY: NORMAL

## 2023-01-08 LAB
COTININE: <5 NG/ML
NICOTINE: <5 NG/ML

## 2023-01-09 ENCOUNTER — TELEPHONE (OUTPATIENT)
Dept: ORTHOPEDIC SURGERY | Age: 68
End: 2023-01-09

## 2023-01-09 RX ORDER — SULFAMETHOXAZOLE AND TRIMETHOPRIM 800; 160 MG/1; MG/1
1 TABLET ORAL 2 TIMES DAILY
Qty: 6 TABLET | Refills: 0 | Status: SHIPPED | OUTPATIENT
Start: 2023-01-09 | End: 2023-01-12

## 2023-01-09 NOTE — TELEPHONE ENCOUNTER
ORTHOPAEDIC NURSE NAVIGATOR SUMMARY NOTE      Anticipated Date of Surgery: 1/17/23    Recieved Pre-Op Education: yes   In person class:no  Pt used educational link:yes   Pt completed pre and post test to measure learning:Yes    If pt did not complete either, why not? N/A  ERAS protocol explained to pt. Pt does not have the following medical conditions:  -Diabetes  -Gastroparesis  -CHF  -Fluid restricted diet  -Known difficult airway  Pt instructed to drink up to 40 oz of Gatorade type drink the evening prior to surgery. Pt informed they can have up to 40 oz of water and that it must be completed 2 hours prior to scheduled surgery. Pt verbalized understanding. PCP: Amber Mayfield MD   Phone #: [phone]    Date of PCP Visit for H&P: 1/5/23    Any Noted Concerns from PCP prior to surgery:  No   If Yes, what concerns?:    IS THE PATIENT IN A PAIN MANAGEMENT PROGRAM?:   No     Review of Past Medical History Reveals History of:      Critical Lab Values:   Hgb/Hct:   Hemoglobin (g/dL)   Date Value   01/05/2023 12.5 (L)   /  Hematocrit (%)   Date Value   01/05/2023 36.0 (L)      HgbA1C:    Lab Results   Component Value Date    LABA1C 5.5 01/05/2023    LABA1C 5.6 08/07/2018    LABA1C 5.9 02/09/2018      Albumin:    Lab Results   Component Value Date    LABALBU 4.3 01/05/2023      BUN/Cr:   BUN (mg/dL)   Date Value   01/05/2023 18   /  Creatinine (mg/dL)   Date Value   01/05/2023 1.0      BMI:    BMI Readings from Last 1 Encounters:   01/05/23 33.43 kg/m²        Coronary Artery Disease/HTN/CHF History: Yes- HTN      Cardiologist:     Cardiac Clearance Necessary: No    Date of Cardiac Clearance Appt: On Plavix? No,  If YES, when will they stop taking?     Final Cardiac Recommendations:N/A   -On any anticoagulation-none       Diabetes History: No    Most Recent HgbA1C: N/A    PCP or Endocrine Recommendations: N/A    Nutritionist/Dietician Consult Scheduled: N/A    Final Plan For Diabetic Control: N/A   Pulmonary: COPD/Emphysema/ Use of home oxygen: none  AJ- cpap     Alcohol use:        DVT Risk Stratification:  Low      Vascular Consult Ordered:  NA    Date of Vascular Appt:     Hematology/Oncology Consult Ordered:  NA    Date of Hematology/Oncology Appt:     Final Recommendation For DVT Prophylaxis:   -Smoking history or use of estrogen-         BMI Greater than 40 at time of scheduling?: No    Has Surgeon been notified of BMI concern? Not Applicable    Weight Loss Clinic Consult Ordered: Not Applicable    Date of Wt Loss Clinic Appt:     BMI at time of surgery (if went through [facility]): Additional Medical Concerns:         Discharge Disposition Information:     Attended Pre-Hab Program: no     Anticipated Discharge Disposition:  Home with no PT    Who will be with patient at home following discharge?   wife      Equipment pt already has:  walker and cane   Bedroom on first or second floor: first   Bathroom on first or second floor: first   Weight bearing status: full   Pre-op ambulatory status: none   Number of entry steps: 2 steps   Caregiver assistance: full time    Pt plan to DC same day: [id number] to    [facility] AT [facility] preference: GERMAIN Durand RN  1/9/2023

## 2023-01-10 ENCOUNTER — HOSPITAL ENCOUNTER (OUTPATIENT)
Dept: PHYSICAL THERAPY | Age: 68
Setting detail: THERAPIES SERIES
Discharge: HOME | End: 2023-01-10
Payer: MEDICARE

## 2023-01-10 PROCEDURE — 97161 PT EVAL LOW COMPLEX 20 MIN: CPT

## 2023-01-10 PROCEDURE — 97110 THERAPEUTIC EXERCISES: CPT

## 2023-01-10 NOTE — PLAN OF CARE
[address], [address], [place], [address]  Phone: [phone], [phone]       Physical Therapy Certification    Dear Kaylee Gerard MD,    We had the pleasure of evaluating the following patient for physical therapy services at [address] [place] [address]. A summary of our findings can be found in the initial assessment below. This includes our plan of care. If you have any questions or concerns regarding these findings, please do not hesitate to contact me at the office phone number checked above. Thank you for the referral.       Physician Signature:_______________________________Date:__________________  By signing above (or electronic signature), therapists plan is approved by physician    Patient: Lina Griffin   : 1955   MRN: [id number]  Referring Physician: Kaylee Gerard MD     Evaluation Date: 1/10/2023      Medical Diagnosis Information:  Diagnosis: M16.11 Osteoarthritis of right hip   Treatment Diagnosis: M25.551 Right hip pain; M16.11 right hip OA                                         Insurance information: PT Insurance Information: /[place]     Precautions/ Contra-indications: none  Latex Allergy:  [x]NO      []YES  Preferred Language for Healthcare:   [x]English       []Other:    C-SSRS Triggered by Intake questionnaire (Past 2 wk assessment):   [x] No, Questionnaire did not trigger screening.   [] Yes, Patient intake triggered further evaluation      [] C-SSRS Screening completed  [] PCP notified via Plan of Care  [] Emergency services notified     SUBJECTIVE: Patient stated complaint: Pt having right hip surgery 2023 with Dr. Natasha Hernandez. Reports tentative plan is going home after surgery. Based on imaging he may have infection and may have to have a temporary one put in and then have actual replacement a month after.      Relevant Medical History: shoulder replacements 15+ years ago  Functional Disability Index: 2/80    Pain Scale: 3-8/10  Easing factors: rest, changing positions   Provocative factors: none     Type: [x]Constant   []Intermittent  []Radiating []Localized []other:     Numbness/Tingling: denies    Occupation/School:retired    Living Status/Prior Level of Function: Independent with ADLs and IADLs    OBJECTIVE:     ROM LEFT RIGHT   HIP Flex  90 P!!   HIP Abd     HIP Ext     Knee Flex  90   Strength  LEFT RIGHT   HIP Flexors  4-   HIP Abductors  4-   HIP Ext  4-   Knee EXT (quad)  4-          Reflexes/Sensation:    []Dermatomes/Myotomes intact    []Reflexes equal and normal bilaterally   []Other:    Joint mobility:    []Normal    [x]Hypo   []Hyper    Palpation: TTP global hip musculature    Functional Mobility/Transfers: independent     Posture: anterior pelvic tilt, genu valgum in single leg stance; weight shifted to L LE    Gait: (include devices/WB status) antalgic, decreased stance time on R LE with     Orthopedic Special Tests: none                       [x] Patient history, allergies, meds reviewed. Medical chart reviewed. See intake form. Review Of Systems (ROS):  [x]Performed Review of systems (Integumentary, CardioPulmonary, Neurological) by intake and observation. Intake form has been scanned into medical record. Patient has been instructed to contact their primary care physician regarding ROS issues if not already being addressed at this time.       Co-morbidities/Complexities (which will affect course of rehabilitation):   []None           Arthritic conditions   []Rheumatoid arthritis (M05.9)  [x]Osteoarthritis (M19.91)   Cardiovascular conditions   [x]Hypertension (I10)  []Hyperlipidemia (E78.5)  []Angina pectoris (I20)  []Atherosclerosis (I70)   Musculoskeletal conditions   []Disc pathology   []Congenital spine pathologies   []Prior surgical intervention  []Osteoporosis (M81.8)  []Osteopenia (M85.8)   Endocrine conditions   []Hypothyroid (E03.9)  []Hyperthyroid Gastrointestinal conditions   []Constipation (V29.18)   Metabolic conditions   []Morbid obesity (E66.01)  [x]Diabetes type 1(E10.65) or 2 (E11.65)   []Neuropathy (G60.9)     Pulmonary conditions   []Asthma (J45)  []Coughing   []COPD (J44.9)   Psychological Disorders  []Anxiety (F41.9)  []Depression (F32.9)   []Other:   []Other:          Barriers to/and or personal factors that will affect rehab potential:              [x]Age  [x]Sex              [x]Motivation/Lack of Motivation                        [x]Co-Morbidities              []Cognitive Function, education/learning barriers              []Environmental, home barriers              []profession/work barriers  [x]past PT/medical experience  []other:  Justification:     Falls Risk Assessment (30 days):   [x] Falls Risk assessed and no intervention required.   [] Falls Risk assessed and Patient requires intervention due to being higher risk   TUG score (>12s at risk):     [] Falls education provided, including     ASSESSMENT:   Functional Impairments:     [x]Noted lumbar/proximal hip/LE joint hypomobility   [x]Decreased LE functional ROM   [x]Decreased core/proximal hip strength and neuromuscular control   [x]Decreased LE functional strength   [x]Reduced balance/proprioceptive control   []other:      Functional Activity Limitations (from functional questionnaire and intake)   [x]Reduced ability to tolerate prolonged functional positions   [x]Reduced ability or difficulty with changes of positions or transfers between positions   [x]Reduced ability to maintain good posture and demonstrate good body mechanics with sitting, bending, and lifting   [x]Reduced ability to sleep   [x] Reduced ability or tolerance with driving and/or computer work   [x]Reduced ability to perform lifting, carrying tasks   [x]Reduced ability to squat   [x]Reduced ability to forward bend   [x]Reduced ability to ambulate prolonged functional periods/distances/surfaces   [x]Reduced ability to ascend/descend stairs   [x]Reduced ability to run, hop, cut or jump   []other:    Participation Restrictions   [x]Reduced participation in self care activities   [x]Reduced participation in home management activities   [x]Reduced participation in work activities   [x]Reduced participation in social activities. [x]Reduced participation in sport/recreation activities. Classification :    []Signs/symptoms consistent with post-surgical status including decreased ROM, strength and function.    []Signs/symptoms consistent with joint sprain/strain   []Signs/symptoms consistent with patella-femoral syndrome   [x]Signs/symptoms consistent with knee OA/hip OA   []Signs/symptoms consistent with internal derangement of knee/Hip   []Signs/symptoms consistent with functional hip weakness/NMR control      []Signs/symptoms consistent with tendinitis/tendinosis    []signs/symptoms consistent with pathology which may benefit from Dry needling      []other:      Prognosis/Rehab Potential:      []Excellent   [x]Good    []Fair   []Poor    Tolerance of evaluation/treatment:    []Excellent   [x]Good    []Fair   []Poor  Physical Therapy Evaluation Complexity Justification  [x] A history of present problem with:  [] no personal factors and/or comorbidities that impact the plan of care;  []1-2 personal factors and/or comorbidities that impact the plan of care  [x]3 personal factors and/or comorbidities that impact the plan of care  [x] An examination of body systems using standardized tests and measures addressing any of the following: body structures and functions (impairments), activity limitations, and/or participation restrictions;:  [] a total of 1-2 or more elements   [] a total of 3 or more elements   [x] a total of 4 or more elements   [x] A clinical presentation with:  [x] stable and/or uncomplicated characteristics   [] evolving clinical presentation with changing characteristics  [] unstable and unpredictable characteristics; [x] Clinical decision making of [x] low, [] moderate, [] high complexity using standardized patient assessment instrument and/or measurable assessment of functional outcome. [] EVAL (LOW) 13378 (typically 20 minutes face-to-face)  [] EVAL (MOD) 77714 (typically 30 minutes face-to-face)  [] EVAL (HIGH) 00088 (typically 45 minutes face-to-face)  [] RE-EVAL     PLAN:   Frequency/Duration:  1 visit  Interventions:  [x]  Therapeutic exercise including: strength training, ROM, for Lower extremity and core   [x]  NMR activation and proprioception for LE, Glutes and Core   [x]  Manual therapy as indicated for LE, Hip and spine to include: Dry Needling/IASTM, STM, PROM, Gr I-IV mobilizations, manipulation. [x] Modalities as needed that may include: thermal agents, E-stim, Biofeedback, US, iontophoresis as indicated  [x] Patient education on joint protection, postural re-education, activity modification, progression of HEP. Therapeutic Ex/NMR  Sets/reps Notes   See med bridge code 15 min                                                                     Pt education on surgery, post-op guideline, post-op PT 8 min    Manual Intervention     Gentle PROM, STM to hip 5 min                     Access Code: [email or web address]  URL: [email or web address]  Date: 01/10/2023  Prepared by: Jeferson Cavazos    Exercises  Supine Heel Slide - 2 x daily - 10 reps  Supine Quad Set - 2 x daily - 10 reps  Supine Short Arc Quad - 2 x daily - 10 reps  Seated Long Arc Quad - 2 x daily - 10 reps    Therapeutic Exercise and NMR EXR  [x] (87231) Provided verbal/tactile cueing for activities related to strengthening, flexibility, endurance, ROM for improvements in LE, proximal hip, and core control with self care, mobility, lifting, ambulation.  [] (85834) Provided verbal/tactile cueing for activities related to improving balance, coordination, kinesthetic sense, posture, motor skill, proprioception  to assist with LE, proximal hip, and core control in self care, mobility, lifting, ambulation and eccentric single leg control. NMR and Therapeutic Activities:    [x] (50307 or 68537) Provided verbal/tactile cueing for activities related to improving balance, coordination, kinesthetic sense, posture, motor skill, proprioception and motor activation to allow for proper function of core, proximal hip and LE with self care and ADLs  [] (06815) Gait Re-education- Provided training and instruction to the patient for proper LE, core and proximal hip recruitment and positioning and eccentric body weight control with ambulation re-education including up and down stairs     Home Exercise Program:    [x] (68372) Reviewed/Progressed HEP activities related to strengthening, flexibility, endurance, ROM of core, proximal hip and LE for functional self-care, mobility, lifting and ambulation/stair navigation   [] (60330)Reviewed/Progressed HEP activities related to improving balance, coordination, kinesthetic sense, posture, motor skill, proprioception of core, proximal hip and LE for self care, mobility, lifting, and ambulation/stair navigation      Manual Treatments:  PROM / STM / Oscillations-Mobs:  G-I, II, III, IV (PA's, Inf., Post.)  [x] (85983) Provided manual therapy to mobilize LE, proximal hip and/or LS spine soft tissue/joints for the purpose of modulating pain, promoting relaxation,  increasing ROM, reducing/eliminating soft tissue swelling/inflammation/restriction, improving soft tissue extensibility and allowing for proper ROM for normal function with self care, mobility, lifting and ambulation.      Modalities:       [] GR/ESU 15 min    [] GR 15 min  [] ESU     [] CP    [] MHP    [x] declined     Charges:  Timed Code Treatment Minutes: 25   Total Treatment Minutes: 35     [x] EVAL (LOW) 08732 (typically 20 minutes face-to-face)  [] EVAL (MOD) 62892 (typically 30 minutes face-to-face)  [] EVAL (HIGH) 69815 (typically 45 minutes face-to-face)  [] RE-EVAL     [x] TE(49102) x 2    [] IONTO  [] NMR (44077) x     [] VASO  [] Manual (58070) x      [] Other:  [] TA x      [] Mech Traction (89648)  [] ES(attended) (25679)      [] ES (un) (47234):     GOALS:  Patient stated goal:   [] Progressing: [] Met: [] Not Met: [] Adjusted    Therapist goals for Patient:   Short Term Goals: To be achieved in: 2 weeks  1. Independent in HEP and progression per patient tolerance, in order to prevent re-injury.   [] Progressing: [] Met: [] Not Met: [] Adjusted  2. Be prepared for surgery    Discharge home with OP PT pending infection during surgery    Electronically signed by:  Saeed Fritz PT DPT, [id number]

## 2023-01-12 RX ORDER — ACETAMINOPHEN 325 MG/1
650 TABLET ORAL EVERY 6 HOURS PRN
COMMUNITY

## 2023-01-12 NOTE — PROGRESS NOTES
1. Do not eat or drink anything after 12 midnight prior to surgery. This includes no water, chewing gum mints, or ice chips. You may brush your teeth and gargle the day of surgery but DO NOT SWALLOW THE WATER. 2. Please see your family doctor/pediatrician for a history and physical and/or concerning medications. Bring any test results/reports from your physician's office. If you are under the care of a heart doctor or specialist please be aware that you may be asked to see him or her for clearance. 3. You may be asked to stop blood thinners such as Coumadin, Plavix, Fragmin, and Lovenox or Anti-inflammatories such as Aspirin, Ibuprofen, Advil, and Naproxen prior to your surgery. Please check with your doctor before stopping these or any other medications. 4. Do not smoke, and do not drink any alcoholic beverages 24 hours prior to surgery. 5. You MUST make arrangements for a responsible adult to take you home after your surgery. For your safety, you will not be allowed to leave alone or drive yourself home. Your surgery will be cancelled if you do not have a ride home. Also for your safety, it is strongly suggested someone stay with you the first 24 hrs after your surgery. 6. A parent/legal guardian must accompany a child scheduled for surgery and plan to stay at the hospital until the child is discharged. Please do not bring other children with you. 7. For your comfort,please wear simple, loose fitting clothing to the hospital.  Please do not bring valuables (money, credit cards, checkbooks, etc.) Do not wear any makeup (including no eye makeup) or nail polish on your fingers or toes. 8. For your safety, please DO NOT wear any jewelry or piercings on day of surgery. All body piercing jewelry must be removed. 9. If you have dentures, they will be removed before going to the OR; for your convenience we will provide you with a container.   If you wear contact lenses or glasses, they will be removed, they will be removed, please bring a case for them. 10. If appicable,Please see your family doctor/pediatrician for a history & physical and/or concerning medications. Bring any test results/reports from your physician's office. 11. Remember to bring Blood Bank bracelet to the hospital on the day of surgery. 12. If you have a Living Will and Durable Power of  for Healthcare, please bring in a copy. 15. Notify your Surgeon if you develop any illness between now and surgery  time, cough, cold, fever, sore throat, nausea, vomiting, etc.  Please notify your surgeon if you experience dizziness, shortness of breath or blurred vision between now & the time of your surgery   14. DO NOT shave your operative site 96 hours prior to surgery. For face & neck surgery, men may use an electric razor 48 hours prior to surgery. 15. Shower the night before surgery with ___Antibacterial soap _X__Hibiclens   16. To provide excellent care visitors will be limited to one in the room at any given time. 17.  Please bring picture ID and insurance card. 18.  Visit our web site for additional information:  Active-Semi/surgery.           INSTRUCTED TO HOLD ENALAPRIL 24 HRS PRIOR PER ANESTHESIA REQUEST (last dose 1/16 am dose)

## 2023-01-13 ENCOUNTER — ANESTHESIA EVENT (OUTPATIENT)
Dept: OPERATING ROOM | Age: 68
End: 2023-01-13
Payer: MEDICARE

## 2023-01-13 NOTE — TELEPHONE ENCOUNTER
BiPAP download data from 12/14/2022 - 1/12/2023 on BiPAP 20/16 cm H2O reviewed. Compliance is good 100%. AHI is very slightly elevated 5.1. Report showing some mask leak also. Work on mask fitting and look for new report in about 30 days.

## 2023-01-15 ENCOUNTER — HOSPITAL ENCOUNTER (OUTPATIENT)
Dept: CT IMAGING | Age: 68
Discharge: HOME | End: 2023-01-15
Payer: MEDICARE

## 2023-01-15 DIAGNOSIS — Z12.2 SCREENING FOR LUNG CANCER: ICD-10-CM

## 2023-01-15 DIAGNOSIS — Z72.0 TOBACCO ABUSE: ICD-10-CM

## 2023-01-15 PROCEDURE — 71271 CT THORAX LUNG CANCER SCR C-: CPT

## 2023-01-16 ENCOUNTER — TELEPHONE (OUTPATIENT)
Dept: ORTHOPEDIC SURGERY | Age: 68
End: 2023-01-16

## 2023-01-17 ENCOUNTER — APPOINTMENT (OUTPATIENT)
Dept: GENERAL RADIOLOGY | Age: 68
End: 2023-01-17
Attending: ORTHOPAEDIC SURGERY
Payer: MEDICARE

## 2023-01-17 ENCOUNTER — ANESTHESIA (OUTPATIENT)
Dept: OPERATING ROOM | Age: 68
End: 2023-01-17
Payer: MEDICARE

## 2023-01-17 ENCOUNTER — HOSPITAL ENCOUNTER (OUTPATIENT)
Age: 68
Discharge: HOME | End: 2023-01-17
Attending: ORTHOPAEDIC SURGERY | Admitting: ORTHOPAEDIC SURGERY
Payer: MEDICARE

## 2023-01-17 VITALS
DIASTOLIC BLOOD PRESSURE: 87 MMHG | HEART RATE: 89 BPM | WEIGHT: 233 LBS | OXYGEN SATURATION: 94 % | TEMPERATURE: 98.6 F | BODY MASS INDEX: 33.36 KG/M2 | RESPIRATION RATE: 17 BRPM | SYSTOLIC BLOOD PRESSURE: 126 MMHG | HEIGHT: 70 IN

## 2023-01-17 DIAGNOSIS — M16.11 PRIMARY OSTEOARTHRITIS OF RIGHT HIP: Primary | ICD-10-CM

## 2023-01-17 LAB
ABO/RH: NORMAL
ANTIBODY SCREEN: NORMAL
GLUCOSE BLD-MCNC: 99 MG/DL (ref 70–99)
PERFORMED ON: NORMAL

## 2023-01-17 PROCEDURE — 6360000002 HC RX W HCPCS: Performed by: ORTHOPAEDIC SURGERY

## 2023-01-17 PROCEDURE — 6370000000 HC RX 637 (ALT 250 FOR IP)

## 2023-01-17 PROCEDURE — 2580000003 HC RX 258: Performed by: ORTHOPAEDIC SURGERY

## 2023-01-17 PROCEDURE — 2580000003 HC RX 258: Performed by: PHYSICIAN ASSISTANT

## 2023-01-17 PROCEDURE — 87176 TISSUE HOMOGENIZATION CULTR: CPT

## 2023-01-17 PROCEDURE — 7100000011 HC PHASE II RECOVERY - ADDTL 15 MIN: Performed by: ORTHOPAEDIC SURGERY

## 2023-01-17 PROCEDURE — 2709999900 HC NON-CHARGEABLE SUPPLY: Performed by: ORTHOPAEDIC SURGERY

## 2023-01-17 PROCEDURE — 6360000002 HC RX W HCPCS: Performed by: ANESTHESIOLOGY

## 2023-01-17 PROCEDURE — 2580000003 HC RX 258: Performed by: ANESTHESIOLOGY

## 2023-01-17 PROCEDURE — 97530 THERAPEUTIC ACTIVITIES: CPT

## 2023-01-17 PROCEDURE — 2720000010 HC SURG SUPPLY STERILE: Performed by: ORTHOPAEDIC SURGERY

## 2023-01-17 PROCEDURE — A4217 STERILE WATER/SALINE, 500 ML: HCPCS | Performed by: ORTHOPAEDIC SURGERY

## 2023-01-17 PROCEDURE — 2500000003 HC RX 250 WO HCPCS

## 2023-01-17 PROCEDURE — 3700000001 HC ADD 15 MINUTES (ANESTHESIA): Performed by: ORTHOPAEDIC SURGERY

## 2023-01-17 PROCEDURE — 97166 OT EVAL MOD COMPLEX 45 MIN: CPT

## 2023-01-17 PROCEDURE — 6360000002 HC RX W HCPCS: Performed by: PHYSICIAN ASSISTANT

## 2023-01-17 PROCEDURE — 7100000001 HC PACU RECOVERY - ADDTL 15 MIN: Performed by: ORTHOPAEDIC SURGERY

## 2023-01-17 PROCEDURE — 86900 BLOOD TYPING SEROLOGIC ABO: CPT

## 2023-01-17 PROCEDURE — 97535 SELF CARE MNGMENT TRAINING: CPT

## 2023-01-17 PROCEDURE — 3700000000 HC ANESTHESIA ATTENDED CARE: Performed by: ORTHOPAEDIC SURGERY

## 2023-01-17 PROCEDURE — 97116 GAIT TRAINING THERAPY: CPT

## 2023-01-17 PROCEDURE — 3600000015 HC SURGERY LEVEL 5 ADDTL 15MIN: Performed by: ORTHOPAEDIC SURGERY

## 2023-01-17 PROCEDURE — 86850 RBC ANTIBODY SCREEN: CPT

## 2023-01-17 PROCEDURE — C9290 INJ, BUPIVACAINE LIPOSOME: HCPCS | Performed by: ORTHOPAEDIC SURGERY

## 2023-01-17 PROCEDURE — 6370000000 HC RX 637 (ALT 250 FOR IP): Performed by: ANESTHESIOLOGY

## 2023-01-17 PROCEDURE — 86901 BLOOD TYPING SEROLOGIC RH(D): CPT

## 2023-01-17 PROCEDURE — 87205 SMEAR GRAM STAIN: CPT

## 2023-01-17 PROCEDURE — 6360000002 HC RX W HCPCS

## 2023-01-17 PROCEDURE — 73501 X-RAY EXAM HIP UNI 1 VIEW: CPT

## 2023-01-17 PROCEDURE — 7100000000 HC PACU RECOVERY - FIRST 15 MIN: Performed by: ORTHOPAEDIC SURGERY

## 2023-01-17 PROCEDURE — 3209999900 FLUORO FOR SURGICAL PROCEDURES

## 2023-01-17 PROCEDURE — 3600000005 HC SURGERY LEVEL 5 BASE: Performed by: ORTHOPAEDIC SURGERY

## 2023-01-17 PROCEDURE — C1776 JOINT DEVICE (IMPLANTABLE): HCPCS | Performed by: ORTHOPAEDIC SURGERY

## 2023-01-17 PROCEDURE — 87075 CULTR BACTERIA EXCEPT BLOOD: CPT

## 2023-01-17 PROCEDURE — 97162 PT EVAL MOD COMPLEX 30 MIN: CPT

## 2023-01-17 PROCEDURE — 2500000003 HC RX 250 WO HCPCS: Performed by: PHYSICIAN ASSISTANT

## 2023-01-17 PROCEDURE — 2500000003 HC RX 250 WO HCPCS: Performed by: ORTHOPAEDIC SURGERY

## 2023-01-17 PROCEDURE — 87070 CULTURE OTHR SPECIMN AEROBIC: CPT

## 2023-01-17 PROCEDURE — 97110 THERAPEUTIC EXERCISES: CPT

## 2023-01-17 PROCEDURE — 7100000010 HC PHASE II RECOVERY - FIRST 15 MIN: Performed by: ORTHOPAEDIC SURGERY

## 2023-01-17 PROCEDURE — 87102 FUNGUS ISOLATION CULTURE: CPT

## 2023-01-17 PROCEDURE — 27130 TOTAL HIP ARTHROPLASTY: CPT | Performed by: ORTHOPAEDIC SURGERY

## 2023-01-17 DEVICE — IMPLANTABLE DEVICE
Type: IMPLANTABLE DEVICE | Site: HIP | Status: FUNCTIONAL
Brand: G7® VIVACIT-E®

## 2023-01-17 DEVICE — AVENIR COMPLETE HIGH OFFSET COLLARLESS SIZE 5: Type: IMPLANTABLE DEVICE | Site: HIP | Status: FUNCTIONAL

## 2023-01-17 DEVICE — IMPLANTABLE DEVICE
Type: IMPLANTABLE DEVICE | Site: HIP | Status: FUNCTIONAL
Brand: G7® ACETABULAR SYSTEM

## 2023-01-17 DEVICE — BIOLOX® DELTA, FEMORAL HEAD, L, CERAMIC, Ø 40/+3.5, TAPER 12/14
Type: IMPLANTABLE DEVICE | Site: HIP | Status: FUNCTIONAL
Brand: BIOLOX® DELTA

## 2023-01-17 RX ORDER — CLINDAMYCIN HYDROCHLORIDE 300 MG/1
CAPSULE ORAL
Qty: 6 CAPSULE | Refills: 0 | Status: SHIPPED | OUTPATIENT
Start: 2023-01-17

## 2023-01-17 RX ORDER — OXYCODONE HYDROCHLORIDE 5 MG/1
5 TABLET ORAL EVERY 4 HOURS PRN
Status: CANCELLED | OUTPATIENT
Start: 2023-01-17

## 2023-01-17 RX ORDER — SODIUM CHLORIDE 0.9 % (FLUSH) 0.9 %
5-40 SYRINGE (ML) INJECTION EVERY 12 HOURS SCHEDULED
Status: DISCONTINUED | OUTPATIENT
Start: 2023-01-17 | End: 2023-01-17 | Stop reason: HOSPADM

## 2023-01-17 RX ORDER — ONDANSETRON 2 MG/ML
4 INJECTION INTRAMUSCULAR; INTRAVENOUS EVERY 6 HOURS PRN
Status: CANCELLED | OUTPATIENT
Start: 2023-01-17

## 2023-01-17 RX ORDER — MELOXICAM 15 MG/1
15 TABLET ORAL DAILY
Qty: 30 TABLET | Refills: 0 | Status: SHIPPED | OUTPATIENT
Start: 2023-01-17

## 2023-01-17 RX ORDER — MORPHINE SULFATE 2 MG/ML
2 INJECTION, SOLUTION INTRAMUSCULAR; INTRAVENOUS
Status: CANCELLED | OUTPATIENT
Start: 2023-01-17

## 2023-01-17 RX ORDER — MEPERIDINE HYDROCHLORIDE 50 MG/ML
12.5 INJECTION INTRAMUSCULAR; INTRAVENOUS; SUBCUTANEOUS EVERY 5 MIN PRN
Status: DISCONTINUED | OUTPATIENT
Start: 2023-01-17 | End: 2023-01-17 | Stop reason: HOSPADM

## 2023-01-17 RX ORDER — MORPHINE SULFATE 4 MG/ML
4 INJECTION, SOLUTION INTRAMUSCULAR; INTRAVENOUS
Status: CANCELLED | OUTPATIENT
Start: 2023-01-17

## 2023-01-17 RX ORDER — OXYCODONE HYDROCHLORIDE 5 MG/1
10 TABLET ORAL PRN
Status: COMPLETED | OUTPATIENT
Start: 2023-01-17 | End: 2023-01-17

## 2023-01-17 RX ORDER — OXYCODONE HYDROCHLORIDE 5 MG/1
10 TABLET ORAL EVERY 4 HOURS PRN
Status: CANCELLED | OUTPATIENT
Start: 2023-01-17

## 2023-01-17 RX ORDER — VANCOMYCIN HYDROCHLORIDE 1 G/20ML
INJECTION, POWDER, LYOPHILIZED, FOR SOLUTION INTRAVENOUS PRN
Status: DISCONTINUED | OUTPATIENT
Start: 2023-01-17 | End: 2023-01-17 | Stop reason: HOSPADM

## 2023-01-17 RX ORDER — METHYLPREDNISOLONE 4 MG/1
TABLET ORAL
Qty: 1 KIT | Refills: 0 | Status: SHIPPED | OUTPATIENT
Start: 2023-01-17

## 2023-01-17 RX ORDER — MELOXICAM 7.5 MG/1
3.75 TABLET ORAL DAILY
Status: CANCELLED | OUTPATIENT
Start: 2023-01-17 | End: 2023-01-20

## 2023-01-17 RX ORDER — DEXAMETHASONE SODIUM PHOSPHATE 4 MG/ML
INJECTION, SOLUTION INTRA-ARTICULAR; INTRALESIONAL; INTRAMUSCULAR; INTRAVENOUS; SOFT TISSUE PRN
Status: DISCONTINUED | OUTPATIENT
Start: 2023-01-17 | End: 2023-01-17 | Stop reason: SDUPTHER

## 2023-01-17 RX ORDER — HYDROMORPHONE HCL 110MG/55ML
PATIENT CONTROLLED ANALGESIA SYRINGE INTRAVENOUS PRN
Status: DISCONTINUED | OUTPATIENT
Start: 2023-01-17 | End: 2023-01-17 | Stop reason: SDUPTHER

## 2023-01-17 RX ORDER — MIDAZOLAM HYDROCHLORIDE 1 MG/ML
INJECTION INTRAMUSCULAR; INTRAVENOUS PRN
Status: DISCONTINUED | OUTPATIENT
Start: 2023-01-17 | End: 2023-01-17 | Stop reason: SDUPTHER

## 2023-01-17 RX ORDER — SENNOSIDES 8.8 MG/5ML
5 LIQUID ORAL 2 TIMES DAILY PRN
Status: CANCELLED | OUTPATIENT
Start: 2023-01-17

## 2023-01-17 RX ORDER — ACETAMINOPHEN 325 MG/1
650 TABLET ORAL EVERY 6 HOURS
Status: CANCELLED | OUTPATIENT
Start: 2023-01-17

## 2023-01-17 RX ORDER — LIDOCAINE HYDROCHLORIDE 10 MG/ML
1 INJECTION, SOLUTION EPIDURAL; INFILTRATION; INTRACAUDAL; PERINEURAL
Status: DISCONTINUED | OUTPATIENT
Start: 2023-01-17 | End: 2023-01-17 | Stop reason: HOSPADM

## 2023-01-17 RX ORDER — SODIUM CHLORIDE 0.9 % (FLUSH) 0.9 %
5-40 SYRINGE (ML) INJECTION PRN
Status: DISCONTINUED | OUTPATIENT
Start: 2023-01-17 | End: 2023-01-17 | Stop reason: HOSPADM

## 2023-01-17 RX ORDER — MAGNESIUM SULFATE IN WATER 40 MG/ML
2000 INJECTION, SOLUTION INTRAVENOUS ONCE
Status: COMPLETED | OUTPATIENT
Start: 2023-01-17 | End: 2023-01-17

## 2023-01-17 RX ORDER — SODIUM CHLORIDE 9 MG/ML
INJECTION, SOLUTION INTRAVENOUS PRN
Status: DISCONTINUED | OUTPATIENT
Start: 2023-01-17 | End: 2023-01-17 | Stop reason: HOSPADM

## 2023-01-17 RX ORDER — ONDANSETRON 4 MG/1
4 TABLET, FILM COATED ORAL 3 TIMES DAILY PRN
Qty: 15 TABLET | Refills: 0 | Status: SHIPPED | OUTPATIENT
Start: 2023-01-17

## 2023-01-17 RX ORDER — PHENYLEPHRINE HCL IN 0.9% NACL 1 MG/10 ML
SYRINGE (ML) INTRAVENOUS PRN
Status: DISCONTINUED | OUTPATIENT
Start: 2023-01-17 | End: 2023-01-17 | Stop reason: SDUPTHER

## 2023-01-17 RX ORDER — SULFAMETHOXAZOLE AND TRIMETHOPRIM 800; 160 MG/1; MG/1
1 TABLET ORAL 2 TIMES DAILY
Qty: 20 TABLET | Refills: 0 | Status: SHIPPED | OUTPATIENT
Start: 2023-01-17 | End: 2023-01-27

## 2023-01-17 RX ORDER — SODIUM CHLORIDE 9 MG/ML
INJECTION, SOLUTION INTRAVENOUS PRN
Status: CANCELLED | OUTPATIENT
Start: 2023-01-17

## 2023-01-17 RX ORDER — FENTANYL CITRATE 50 UG/ML
INJECTION, SOLUTION INTRAMUSCULAR; INTRAVENOUS PRN
Status: DISCONTINUED | OUTPATIENT
Start: 2023-01-17 | End: 2023-01-17 | Stop reason: SDUPTHER

## 2023-01-17 RX ORDER — LIDOCAINE HYDROCHLORIDE 10 MG/ML
2 INJECTION, SOLUTION INFILTRATION; PERINEURAL
Status: DISCONTINUED | OUTPATIENT
Start: 2023-01-17 | End: 2023-01-17 | Stop reason: HOSPADM

## 2023-01-17 RX ORDER — SODIUM CHLORIDE, SODIUM LACTATE, POTASSIUM CHLORIDE, CALCIUM CHLORIDE 600; 310; 30; 20 MG/100ML; MG/100ML; MG/100ML; MG/100ML
INJECTION, SOLUTION INTRAVENOUS CONTINUOUS
Status: CANCELLED | OUTPATIENT
Start: 2023-01-17

## 2023-01-17 RX ORDER — SUCCINYLCHOLINE CHLORIDE 20 MG/ML
INJECTION INTRAMUSCULAR; INTRAVENOUS PRN
Status: DISCONTINUED | OUTPATIENT
Start: 2023-01-17 | End: 2023-01-17 | Stop reason: SDUPTHER

## 2023-01-17 RX ORDER — CELECOXIB 100 MG/1
200 CAPSULE ORAL ONCE
Status: COMPLETED | OUTPATIENT
Start: 2023-01-17 | End: 2023-01-17

## 2023-01-17 RX ORDER — ONDANSETRON 8 MG/1
4 TABLET, ORALLY DISINTEGRATING ORAL EVERY 8 HOURS PRN
Status: CANCELLED | OUTPATIENT
Start: 2023-01-17

## 2023-01-17 RX ORDER — LABETALOL HYDROCHLORIDE 5 MG/ML
5 INJECTION, SOLUTION INTRAVENOUS EVERY 10 MIN PRN
Status: DISCONTINUED | OUTPATIENT
Start: 2023-01-17 | End: 2023-01-17 | Stop reason: HOSPADM

## 2023-01-17 RX ORDER — CYCLOBENZAPRINE HCL 10 MG
10 TABLET ORAL 3 TIMES DAILY PRN
Qty: 30 TABLET | Refills: 0 | Status: SHIPPED | OUTPATIENT
Start: 2023-01-17 | End: 2023-01-27

## 2023-01-17 RX ORDER — POLYETHYLENE GLYCOL 3350 17 G/17G
17 POWDER, FOR SOLUTION ORAL DAILY
Status: CANCELLED | OUTPATIENT
Start: 2023-01-17

## 2023-01-17 RX ORDER — SODIUM CHLORIDE 0.9 % (FLUSH) 0.9 %
5-40 SYRINGE (ML) INJECTION PRN
Status: CANCELLED | OUTPATIENT
Start: 2023-01-17

## 2023-01-17 RX ORDER — ROCURONIUM BROMIDE 10 MG/ML
INJECTION, SOLUTION INTRAVENOUS PRN
Status: DISCONTINUED | OUTPATIENT
Start: 2023-01-17 | End: 2023-01-17 | Stop reason: SDUPTHER

## 2023-01-17 RX ORDER — DIPHENHYDRAMINE HYDROCHLORIDE 50 MG/ML
12.5 INJECTION INTRAMUSCULAR; INTRAVENOUS
Status: DISCONTINUED | OUTPATIENT
Start: 2023-01-17 | End: 2023-01-17 | Stop reason: HOSPADM

## 2023-01-17 RX ORDER — ACETAMINOPHEN 500 MG
1000 TABLET ORAL ONCE
Status: COMPLETED | OUTPATIENT
Start: 2023-01-17 | End: 2023-01-17

## 2023-01-17 RX ORDER — ONDANSETRON 2 MG/ML
INJECTION INTRAMUSCULAR; INTRAVENOUS PRN
Status: DISCONTINUED | OUTPATIENT
Start: 2023-01-17 | End: 2023-01-17 | Stop reason: SDUPTHER

## 2023-01-17 RX ORDER — LIDOCAINE HYDROCHLORIDE 20 MG/ML
INJECTION, SOLUTION INFILTRATION; PERINEURAL PRN
Status: DISCONTINUED | OUTPATIENT
Start: 2023-01-17 | End: 2023-01-17 | Stop reason: SDUPTHER

## 2023-01-17 RX ORDER — SODIUM CHLORIDE 0.9 % (FLUSH) 0.9 %
5-40 SYRINGE (ML) INJECTION EVERY 12 HOURS SCHEDULED
Status: CANCELLED | OUTPATIENT
Start: 2023-01-17

## 2023-01-17 RX ORDER — PROPOFOL 10 MG/ML
INJECTION, EMULSION INTRAVENOUS PRN
Status: DISCONTINUED | OUTPATIENT
Start: 2023-01-17 | End: 2023-01-17 | Stop reason: SDUPTHER

## 2023-01-17 RX ORDER — OXYCODONE HYDROCHLORIDE 5 MG/1
5 TABLET ORAL PRN
Status: COMPLETED | OUTPATIENT
Start: 2023-01-17 | End: 2023-01-17

## 2023-01-17 RX ORDER — ASPIRIN 81 MG/1
81 TABLET ORAL 2 TIMES DAILY
Qty: 60 TABLET | Refills: 0 | Status: SHIPPED | OUTPATIENT
Start: 2023-01-18

## 2023-01-17 RX ORDER — ONDANSETRON 2 MG/ML
4 INJECTION INTRAMUSCULAR; INTRAVENOUS
Status: DISCONTINUED | OUTPATIENT
Start: 2023-01-17 | End: 2023-01-17 | Stop reason: HOSPADM

## 2023-01-17 RX ORDER — SODIUM CHLORIDE, SODIUM LACTATE, POTASSIUM CHLORIDE, CALCIUM CHLORIDE 600; 310; 30; 20 MG/100ML; MG/100ML; MG/100ML; MG/100ML
INJECTION, SOLUTION INTRAVENOUS CONTINUOUS
Status: DISCONTINUED | OUTPATIENT
Start: 2023-01-17 | End: 2023-01-17 | Stop reason: HOSPADM

## 2023-01-17 RX ORDER — OXYCODONE HYDROCHLORIDE 5 MG/1
5 TABLET ORAL EVERY 6 HOURS PRN
Qty: 28 TABLET | Refills: 0 | Status: SHIPPED | OUTPATIENT
Start: 2023-01-17 | End: 2023-01-24

## 2023-01-17 RX ORDER — GABAPENTIN 300 MG/1
300 CAPSULE ORAL ONCE
Status: COMPLETED | OUTPATIENT
Start: 2023-01-17 | End: 2023-01-17

## 2023-01-17 RX ADMIN — MAGNESIUM SULFATE IN WATER 2000 MG: 40 INJECTION, SOLUTION INTRAVENOUS at 08:32

## 2023-01-17 RX ADMIN — FENTANYL CITRATE 50 MCG: 50 INJECTION INTRAMUSCULAR; INTRAVENOUS at 08:17

## 2023-01-17 RX ADMIN — Medication 1500 MG: at 07:45

## 2023-01-17 RX ADMIN — TRANEXAMIC ACID 1000 MG: 100 INJECTION, SOLUTION INTRAVENOUS at 08:01

## 2023-01-17 RX ADMIN — GABAPENTIN 300 MG: 300 CAPSULE ORAL at 07:25

## 2023-01-17 RX ADMIN — Medication 100 MCG: at 08:33

## 2023-01-17 RX ADMIN — Medication 100 MCG: at 08:47

## 2023-01-17 RX ADMIN — HYDROMORPHONE HYDROCHLORIDE 0.5 MG: 1 INJECTION, SOLUTION INTRAMUSCULAR; INTRAVENOUS; SUBCUTANEOUS at 09:53

## 2023-01-17 RX ADMIN — OXYCODONE HYDROCHLORIDE 10 MG: 5 TABLET ORAL at 10:31

## 2023-01-17 RX ADMIN — CELECOXIB 200 MG: 100 CAPSULE ORAL at 06:06

## 2023-01-17 RX ADMIN — PROPOFOL 120 MG: 10 INJECTION, EMULSION INTRAVENOUS at 07:37

## 2023-01-17 RX ADMIN — SUCCINYLCHOLINE CHLORIDE 100 MG: 20 INJECTION, SOLUTION INTRAMUSCULAR; INTRAVENOUS at 07:39

## 2023-01-17 RX ADMIN — Medication 100 MCG: at 07:56

## 2023-01-17 RX ADMIN — FENTANYL CITRATE 50 MCG: 50 INJECTION INTRAMUSCULAR; INTRAVENOUS at 07:36

## 2023-01-17 RX ADMIN — LIDOCAINE HYDROCHLORIDE 100 MG: 20 INJECTION, SOLUTION INFILTRATION; PERINEURAL at 07:36

## 2023-01-17 RX ADMIN — Medication 100 MCG: at 07:58

## 2023-01-17 RX ADMIN — HYDROMORPHONE HYDROCHLORIDE 1 MG: 2 INJECTION, SOLUTION INTRAMUSCULAR; INTRAVENOUS; SUBCUTANEOUS at 09:20

## 2023-01-17 RX ADMIN — PROPOFOL 40 MG: 10 INJECTION, EMULSION INTRAVENOUS at 07:39

## 2023-01-17 RX ADMIN — MIDAZOLAM HYDROCHLORIDE 1 MG: 2 INJECTION, SOLUTION INTRAMUSCULAR; INTRAVENOUS at 07:30

## 2023-01-17 RX ADMIN — ACETAMINOPHEN 1000 MG: 500 TABLET ORAL at 06:06

## 2023-01-17 RX ADMIN — SUGAMMADEX 200 MG: 100 INJECTION, SOLUTION INTRAVENOUS at 09:27

## 2023-01-17 RX ADMIN — ROCURONIUM BROMIDE 50 MG: 10 SOLUTION INTRAVENOUS at 07:54

## 2023-01-17 RX ADMIN — DEXAMETHASONE SODIUM PHOSPHATE 8 MG: 4 INJECTION, SOLUTION INTRAMUSCULAR; INTRAVENOUS at 07:58

## 2023-01-17 RX ADMIN — SODIUM CHLORIDE, POTASSIUM CHLORIDE, SODIUM LACTATE AND CALCIUM CHLORIDE: 600; 310; 30; 20 INJECTION, SOLUTION INTRAVENOUS at 06:07

## 2023-01-17 RX ADMIN — ONDANSETRON 4 MG: 2 INJECTION INTRAMUSCULAR; INTRAVENOUS at 07:58

## 2023-01-17 ASSESSMENT — PAIN SCALES - GENERAL
PAINLEVEL_OUTOF10: 4
PAINLEVEL_OUTOF10: 8
PAINLEVEL_OUTOF10: 4
PAINLEVEL_OUTOF10: 7
PAINLEVEL_OUTOF10: 8

## 2023-01-17 ASSESSMENT — PAIN DESCRIPTION - ORIENTATION
ORIENTATION: RIGHT

## 2023-01-17 ASSESSMENT — PAIN DESCRIPTION - LOCATION
LOCATION: HIP

## 2023-01-17 ASSESSMENT — PAIN DESCRIPTION - DESCRIPTORS
DESCRIPTORS: PRESSURE;SORE;STABBING
DESCRIPTORS: ACHING
DESCRIPTORS: ACHING

## 2023-01-17 ASSESSMENT — PAIN - FUNCTIONAL ASSESSMENT: PAIN_FUNCTIONAL_ASSESSMENT: ACTIVITIES ARE NOT PREVENTED

## 2023-01-17 NOTE — PROGRESS NOTES
Admitted to [facility]. IV started, consents verified, site prepped. NPO @ 515 am per patient. Completed carbohydrate loading per protocol per patient.  Kera Narvaez RN No

## 2023-01-17 NOTE — PROGRESS NOTES
Occupational Therapy  Facility/Department: [facility]  Occupational Therapy Initial Assessment & Treatment    Name: Dominique Fischer  : 1955  MRN: [id number]  Date of Service: 2023    Discharge Recommendations:  24 hour supervision or assist  OT Equipment Recommendations  Equipment Needed: Yes  Mobility Devices: ADL Assistive Devices  ADL Assistive Devices: Transfer Tub Bench       Patient Diagnosis(es): The encounter diagnosis was Primary osteoarthritis of right hip. Past Medical History:  has a past medical history of Arthritis, CTS (carpal tunnel syndrome), Erectile dysfunction, GERD (gastroesophageal reflux disease), Hyperglycemia, Hyperlipidemia, Hypertension, Kidney stone, Malignant neoplasm of prostate ([facility] [address] AJ treated with BiPAP, Osteoarthritis, Trigger thumb, and Vitamin D deficiency. Past Surgical History:  has a past surgical history that includes Appendectomy; Hemorrhoid surgery; back surgery; joint replacement; Carpal tunnel release (); Total shoulder arthroplasty (Left, 13); Cystoscopy (Right, 2016); other surgical history (10/12/2016); Prostatectomy; Colonoscopy (2018); and pr colonoscopy flx dx w/collj spec when pfrmd (N/A, 8/15/2018). Assessment   Performance deficits / Impairments: Decreased functional mobility ; Decreased ADL status; Decreased balance;Decreased high-level IADLs;Decreased coordination;Decreased endurance  Assessment: Pt is a 76 yo M who presented to [facility] for R GENIE on 2023. Pt lives w/ spouse in single-story home, 2 AWAIS, and is IND w/ ADLs and amb at baseline; pt has been req [facility] and  use recently 2/2 pain in R hip. Upon eval, pt req SBA for bed mob, CGA for func mob/transfers and SBA grossly w/ set-up for total body dressing. Pt declined concerns w/ returning to home envrionment w/ assist of wife; some concerns for ongoing dizziness since surgery but all pt's vitals WFL - RN monitoring and encouraging PO intake.  Anticipate pt will be safe to return home w/ initial 24hr assist at d/c to maximize his functional status and safety upon return home. Prognosis: Good  Decision Making: Medium Complexity  REQUIRES OT FOLLOW-UP: Yes  Activity Tolerance  Activity Tolerance: Patient Tolerated treatment well;Treatment limited secondary to medical complications (free text)  Activity Tolerance Comments: pt endorsing dizziness throughout session. BP taken and recorded, all WFL.  RN notified and monitoring        AM-PAC Daily Activity Inpatient   How much help for putting on and taking off regular lower body clothing?: A Little  How much help for Bathing?: A Little  How much help for Toileting?: A Little  How much help for putting on and taking off regular upper body clothing?: None  How much help for taking care of personal grooming?: None  How much help for eating meals?: None  AM-[facility] Inpatient Daily Activity Raw Score: 21  AM-PAC Inpatient ADL T-Scale Score : 44.27  ADL Inpatient CMS 0-100% Score: 32.79  ADL Inpatient CMS G-Code Modifier : CJ     Plan   Occupational Therapy Plan  Times Per Week: 4-6x/wk  Current Treatment Recommendations: Strengthening, Balance training, Functional mobility training, Endurance training, Gait training, Patient/Caregiver education & training, Self-Care / ADL, Home management training, Safety education & training     Restrictions  Restrictions/Precautions  Restrictions/Precautions: Weight Bearing, Fall Risk  Required Braces or Orthoses?: No  Lower Extremity Weight Bearing Restrictions  Right Lower Extremity Weight Bearing: Weight Bearing As Tolerated  Position Activity Restriction  Hip Precautions: Anterior hip precautions  Other position/activity restrictions: no active SLR    Subjective   General  Chart Reviewed: Yes, Orders, History and Physical, Progress Notes  Patient assessed for rehabilitation services?: Yes  Subjective  Subjective: pt seated at side of bed w/ PT at end of PT eval, agreeable to OT eval  Pain: Denies pain    Social/Functional History  Social/Functional History  Lives With: Spouse  Type of Home: House  Home Layout: Able to Live on Main level with bedroom/bathroom, Multi-level  Home Access: Stairs to enter with rails  Entrance Stairs - Number of Steps: 2 AWAIS  Entrance Stairs - Rails: Both  Bathroom Shower/Tub: Tub/Shower unit  Bathroom Toilet: Handicap height  Bathroom Equipment: Grab bars in shower  Home Equipment: Julio Opal, standard, Cane  Has the patient had two or more falls in the past year or any fall with injury in the past year?: No  ADL Assistance: Independent  Homemaking Assistance: Independent  Homemaking Responsibilities: No (Wife completes)  Ambulation Assistance: Independent (HAs been using SW for dat past month, prior to that used cane PRN)  Transfer Assistance: Independent (Wife started helping last 3 weeks due to hip pain)  Active : Yes  Occupation: Full time employment  Type of Occupation: Farm  Leisure & Hobbies: go to coffee shops       Objective   Heart Rate: 89  Heart Rate Source: Monitor  BP: 126/87  BP Location: Left upper arm  BP Method: Automatic  Patient Position: Semi fowlers  MAP (Calculated): 100  Resp: 17  SpO2: 94 %  O2 Device: None (Room air)            Observation/Palpation  Posture: Fair  Safety Devices  Type of Devices: Patient at risk for falls; Left in bed;Gait belt;Nurse notified;Call light within reach  Restraints  Restraints Initially in Place: No    Bed Mobility Training  Bed Mobility Training: Yes  Supine to Sit: Other (comment) (pt received at EOB w/ PT)  Sit to Supine: Stand-by assistance; Additional time (HOB elevated)  Scooting: Stand-by assistance (towards EOB)  Balance  Sitting: Intact  Standing: Impaired  Standing - Static: Constant support; Fair  Standing - Dynamic: Constant support; Fair  Transfer Training  Transfer Training: Yes  Overall Level of Assistance: Contact-guard assistance  Sit to Stand: Contact-guard assistance; Additional time; Adaptive equipment (w/ SW)  Stand to Sit: Contact-guard assistance; Adaptive equipment; Additional time (w/ SW)  Car Transfer: Contact-guard assistance; Adaptive equipment; Additional time (simulated; w/ SW)  Gait Training  Gait Training: Yes  Gait  Overall Level of Assistance: Contact-guard assistance; Adaptive equipment; Additional time (pt ambulated ~40ft w/ SW to simulate distance pt is req to ambulate from chair>front door)  Interventions: Tactile cues; Safety awareness training;Verbal cues       AROM: Within functional limits  PROM: Within functional limits  Strength: Within functional limits  Coordination: Within functional limits  Tone: Normal  Sensation: Intact    ADL  Feeding: Independent; Beverage management  UE Dressing: Supervision;Setup (seated at EOB)  UE Dressing Skilled Clinical Factors: [facility] gon, don T shirt and button up shirt  LE Dressing: Setup; Increased time to complete;Stand by assistance (seated at EOB)  LE Dressing Skilled Clinical Factors: don brief, underwear and pants. A for navigating pants around non-slip socks       Activity Tolerance  Activity Tolerance: Patient tolerated evaluation without incident          Vision  Vision: Impaired  Vision Exceptions: Wears glasses at all times  Hearing  Hearing: Exceptions to [facility]  Hearing Exceptions: Hard of hearing/hearing concerns    Cognition  Overall Cognitive Status: WNL  Orientation  Overall Orientation Status: Within Normal Limits  Orientation Level: Oriented X4                    Education Given To: Family; Patient  Education Provided: Role of Therapy;Plan of Care;IADL Safety;Transfer Training; Fall Prevention Strategies; ADL Adaptive Strategies;Precautions  Education Method: Demonstration;Verbal  Barriers to Learning: None  Education Outcome: Verbalized understanding;Demonstrated understanding    Patient Educated in safety with car transfers and  dispensed instruction on car transfers with use of assistive device with patient demonstrating:  [x] Verbalizing understanding of appropriate technique, maintaining any ordered precautions for car transfer training s/p TJR  [] Craig with simulated car transfer with walker  [x] He/she requires SBA assist and will have someone at discharge to help with transfers with patient verbalizing understanding of any ordered TJR precautions employing appropriate technique. [x] Other: Educated patient in written car transfer instruction with patient verbalizing understanding of appropriate techniques. Disease Specific Education: Pt educated on weight bearing status, post-op precautions, appropriate DME, and safe mobility with AD. Pt verbalized understanding        Goals  Short Term Goals  Time Frame for Short Term Goals: 1 week (1/24/2023) unless otherwise noted  Short Term Goal 1: Pt will complete bed mob w/ SBA - goal met 1/17  Short Term Goal 2: Pt will complete total body dressing w/ SPV and set-up  Short Term Goal 3: Pt will complete toileting w/ SBA and AD as needed  Patient Goals   Patient goals : \"I want to be able to walk down the driveway with a little help (SBA)\" - goal met 1/17       Therapy Time   Individual Concurrent Group Co-treatment   Time In 1400         Time Out 1450         Minutes 50         Timed Code Treatment Minutes: 40 Minutes (10 min eval)     If pt is unable to be seen after this session, please let this note serve as discharge summary. Please see case management note for discharge disposition. Thank you.      Daryl Ng, OTR/L

## 2023-01-17 NOTE — DISCHARGE INSTRUCTIONS
*** Please contact [address] with any questions or concerns after your discharge. *** Mon- Fri 9am- 5pm [phone]. If you have any issues or concerns after 5pm or on the weekend please call your surgeon's office. I will be contacting you in a few days to follow up. If you need a pain medication refill please contact your surgeon's office. Total Hip & Bipolar Replacement  Discharge Instructions    To prevent Clot formation, you have been placed on the following medication:  Take aspirin 81 mg twice a day starting day after surgery   Surgical Site Care:  Keep incision clean and dry. May shower on post-op day #3 with waterproof dressing on. Change to new waterproof dressing between 5-7 days. Physical Therapy:  Weight Bearing Status:     Weight bearing as tolerated  Precautions  Per Physical Therapy handout  No straight leg raise   Pain Medications  You were given oxycodone (Oxycontin, Oxyir)  Wean off pain medications as you deem appropriate as long as pain is under control  Be sure to drink plenty of fluids (recommend water) while taking narcotic pain medications to prevent constipation  You may take an over the counter laxative or stool softener as needed to prevent/treat constipation as well, we recommend Senokot S OTC. We recommend that you consider taking these medications the entire time you are taking pain medication. Cold packs/Ice packs/Machine  May be used as much as necessary to reduce swelling/inflammation/soreness  Be sure to have a barrier (cloth, clothing, towel) between your skin/incision and the ice pack to prevent frostbite  Contact office if  Increased redness, swelling, drainage of any kind, and/or pain to surgery site. As well as new onset fevers and or chills. These could signify an infection. Calf or thigh tenderness to touch as well as increased swelling or redness. This could signify a clot formation.   Numbness or tingling to an area around the incision site or below the incision site (toes). Any rash appears, increased  or new onset nausea/vomiting occur. This may indicate a reaction to a medication. Phone # [phone]  Follow up with Dr. Hema Garza or Matthew Alfonso PA-C at scheduled appointment time. Please continue to use your Incentive Spirometer at home every hour while awake.    Discharge Medications    Narcotic Pain Medications    ____ Hydrocodone/acetaminophen 5/325mg 1 tab every 4 hours as needed for pain  __x__ Oxycodone 5mg 1 tab every 6 hours as needed for pain  ____ Tramadol 50mg 1 tab every 4 hours as needed for pain    Anti-inflammatory/Pain Medication    __x__ Meloxicam 15mg 1 tab once a day  ____ Celebrex 200mg 1 tab once a day  __x__ Medrol Dosepak 1 kit as directed (start post-op day 1)    If Medrol Dosepak and either Meloxicam or Celebrex is prescribed complete the Medrol Dosepak before starting Meloxicam or Celebrex    Anti-coagulation Medication  __x__ Aspirin 81mg 1 tab twice per day  ____ Eliquis 2.5mg 1 tab twice per day  ____ Lovenox 40mg once per day  ____ Lovenox 30mg twice per day    Start anti-coagulation medications the day after surgery    Muscle relaxer     __x__ Cyclobenzaprine 10mg 1 tab up to 3 times a day as needed for muscle spasms and pain  ____ Methocarbamol 750mg 1 tab up to 3 times a day as needed for muscle spasms and pain          Nausea/Vomiting Medications    __x__ Ondansetron 4 mg 1 tab up to 4 times daily as needed  ____ Promethazine 25mg 1 tab up to 3 times daily as needed    Antibiotics    ____ Keflex 500mg 1 tab 4 times daily  ___x_ Sulfamethoxazole/trimethoprim 800/160mg 1 tab twice per day for 10 days  ____ Clindamycin 300mg 2 tabs twice per day  ____ cefadroxil 500mg 1 tab po twice per day

## 2023-01-17 NOTE — ANESTHESIA PRE PROCEDURE
Department of Anesthesiology  Preprocedure Note       Name:  Katerine Vallejo   Age:  75 y.o.  :  1955                                          MRN:  [id number]         Date:  2023      Surgeon: David Crews): Dat De La Garza MD    Procedure: Procedure(s):  RIGHT TOTAL HIP ARTHROPLASTY MINIMALLY INVASIVE DIRECT ANTERIOR     ALESIA BIOMET    Medications prior to admission:   Prior to Admission medications    Medication Sig Start Date End Date Taking?  Authorizing Provider   acetaminophen (TYLENOL) 325 MG tablet Take 650 mg by mouth every 6 hours as needed for Pain   Yes Historical Provider, MD   Ascorbic Acid 500 MG CHEW Take 500 mg by mouth daily    Historical Provider, MD   Psyllium 33 % POWD Take 1 packet by mouth daily  Patient not taking: Reported on 2023    Historical Provider, MD   diclofenac (VOLTAREN) 75 MG EC tablet TAKE 1 TABLET BY MOUTH 2 TIMES DAILY 23   KARLOS Small CNP   enalapril (VASOTEC) 20 MG tablet TAKE 1 TABLET BY MOUTH 2 TIMES DAILY 22   KARLOS Ocampo CNP   polyethylene glycol [facility]) 17 GM/SCOOP powder Place 17 g in glass of water or coffee 1-2 times a day as needed for constipation 22   KARLOS Ocampo CNP   mupirocin (BACTROBAN) 2 % ointment Apply twice daily to each nare for 5 days prior to surgical procedure 22   Annmarie Duggan PA-C   simvastatin (ZOCOR) 20 MG tablet TAKE 1 TABLET AT BEDTIME 22   Raúl Robles MD   omeprazole (PRILOSEC) 40 MG delayed release capsule Take 1 capsule by mouth every morning (before breakfast) 22   KARLOS Ocampo CNP   Vitamin D (CHOLECALCIFEROL) 25 MCG (1000 UT) TABS tablet TAKE 2 TABLETS BY MOUTH DAILY 22   KARLOS Ocampo CNP   Omega-3 Fatty Acids (FISH OIL) 1000 MG capsule TAKE 6 CAPSULES TWICE DAILY 22   KARLOS Ocampo CNP   niacin (NIASPAN) 1000 MG extended release tablet TAKE ONE TABLET BY MOUTH NIGHTLY 11/14/22   Caitlyn Glass MD   Ferrous Sulfate (IRON) 325 (65 Fe) MG TABS TAKE ONE TABLET BY MOUTH DAILY  Patient taking differently: Take 2 tablets by mouth Daily TAKE ONE TABLET BY MOUTH DAILY 6/29/21   Caitlyn Glass MD   Biotin 1000 MCG TABS Take 1 tablet by mouth daily     Historical Provider, MD   allopurinol (ZYLOPRIM) 100 MG tablet Take 200 mg by mouth daily     Historical Provider, MD   Misc Natural Products (OSTEO BI-FLEX TRIPLE STRENGTH PO) Take 2 capsules by mouth daily.     Historical Provider, MD       Current medications:    Current Facility-Administered Medications   Medication Dose Route Frequency Provider Last Rate Last Admin    tranexamic acid (CYKLOKAPRON) 1,000 mg in sodium chloride 0.9 % 100 mL IVPB  1,000 mg IntraVENous On Call [facility] [address] [facility]        Followed by   Cruz Luna tranexamic acid (CYKLOKAPRON) 1,000 mg in sodium chloride 0.9 % 100 mL IVPB  1,000 mg IntraVENous On Call to [address] [employer] [facility]        sodium chloride flush 0.9 % injection 5-40 mL  5-40 mL IntraVENous 2 times per day Dylan Lam PA-C        sodium chloride flush 0.9 % injection 5-40 mL  5-40 mL IntraVENous PRN Dylan Lam PA-C        0.9 % sodium chloride infusion   IntraVENous PRN Dylan Lam PA-C        vancomycin 1500 mg in dextrose 5% 300 mL IVPB  1,500 mg IntraVENous On Call to [address] [employer] [facility]        lidocaine PF 1 % injection 1 mL  1 mL IntraDERmal Once PRN Odalis Nicolas MD        sodium chloride flush 0.9 % injection 5-40 mL  5-40 mL IntraVENous 2 times per day Odalis Nicolas MD        sodium chloride flush 0.9 % injection 5-40 mL  5-40 mL IntraVENous PRN Odalis Nicolas MD        0.9 % sodium chloride infusion   IntraVENous PRN Odalis Nicolas MD        magnesium sulfate 2000 mg in 50 mL IVPB premix  2,000 mg IntraVENous Once Odalis Nicolas MD        lidocaine 1 % injection 2 mL  2 mL IntraDERmal Once PRN Lilia Ramires MD        lactated ringers infusion   IntraVENous Continuous Lilia Ramires  mL/hr at 01/17/23 0607 New Bag at 01/17/23 0607    gabapentin (NEURONTIN) capsule 300 mg  300 mg Oral Once Dora Moore APRN - CRNA           Allergies: Allergies   Allergen Reactions    Suprax [Cefixime] Itching     Swollen hands and itching       Problem List:    Patient Active Problem List   Diagnosis Code    Vitamin D deficiency E55.9    ED (erectile dysfunction) N52.9    Essential hypertension, benign I10    Hyperglycemia R73.9    Hyperuricemia E79.0    left TSR Z96.619    Severe obstructive sleep apnea G47.33    Gastroesophageal reflux disease without esophagitis K21.9    Dyspepsia and disorder of function of stomach K31.9, A13.55    Renal colic on right side Y94    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 9) A43.7    Umbilical hernia without obstruction and without gangrene K42.9    Primary insomnia F51.01    Lower urinary tract symptoms R39.9    Malignant neoplasm of prostate ([address] C61    History of prostate cancer Z85.46    Kidney stones N20.0    Status post prostatectomy Z90.79    Incisional hernia K43.2    Blurred vision, bilateral H53.8    Intermittent claudication (HCC) I73.9    Impingement syndrome of both shoulders M75.41, M75.42    Drug-induced constipation K59.03    Primary osteoarthritis of right hip M16.11       Past Medical History:        Diagnosis Date    Arthritis     CTS (carpal tunnel syndrome)     Erectile dysfunction     GERD (gastroesophageal reflux disease)     Hyperglycemia     Hyperlipidemia     Hypertension     Kidney stone 09/2016    Malignant neoplasm of prostate ([address] 4/19/2021    AJ treated with BiPAP     Osteoarthritis     Trigger thumb  Vitamin D deficiency        Past Surgical History:        Procedure Laterality Date    APPENDECTOMY      BACK SURGERY      CARPAL TUNNEL RELEASE      right    COLONOSCOPY  2018    diverticulosis    CYSTOSCOPY Right 2016     RIGHT URETEROSCOPY , RIGHT STENT PLACEMENT    HEMORRHOID SURGERY      JOINT REPLACEMENT      right shoulder replacement    OTHER SURGICAL HISTORY  10/12/2016    CYSTOSCOPY, DIAGNOSTIC RIGHT URETEROSCOPY, RIGHT RETROGRADE    [place] COLONOSCOPY FLX DX W/COLLJ SPEC WHEN PFRMD N/A 8/15/2018    COLONOSCOPY performed by Evelin Bacon MD at [address]    left total shoulder replacement       Social History:    Social History     Tobacco Use    Smoking status: Former     Packs/day: 1.00     Years: 30.00     Pack years: 30.00     Types: Cigarettes     Start date: 1989     Quit date: 2022     Years since quittin.1    Smokeless tobacco: Never   Substance Use Topics    Alcohol use:  Yes     Alcohol/week: 0.0 standard drinks     Comment: occasionally -2 x month                                Counseling given: Not Answered      Vital Signs (Current):   Vitals:    23 1505 23 0559   BP:  (!) 142/73   Pulse:  74   Resp:  22   Temp:  99.1 °F (37.3 °C)   TempSrc:  Temporal   SpO2:  98%   Weight: 233 lb (105.7 kg)    Height: 5' 10\" (1.778 m)                                               BP Readings from Last 3 Encounters:   23 (!) 142/73   23 122/74   22 128/80       NPO Status: Time of last liquid consumption: 515                        Time of last solid consumption:                         Date of last liquid consumption: 23                        Date of last solid food consumption: 23    BMI:   Wt Readings from Last 3 Encounters:   23 233 lb (105.7 kg)   23 233 lb (105.7 kg)   22 228 lb (103.4 kg)     Body mass index is 33.43 kg/m².    CBC:   Lab Results   Component Value Date/Time    WBC 6.8 01/05/2023 09:35 AM    RBC 3.89 01/05/2023 09:35 AM    HGB 12.5 01/05/2023 09:35 AM    HCT 36.0 01/05/2023 09:35 AM    MCV 92.7 01/05/2023 09:35 AM    RDW 14.1 01/05/2023 09:35 AM     01/05/2023 09:35 AM       CMP:   Lab Results   Component Value Date/Time     01/05/2023 09:35 AM    K 4.5 01/05/2023 09:35 AM    K 4.9 05/03/2020 09:00 AM     01/05/2023 09:35 AM    CO2 27 01/05/2023 09:35 AM    BUN 18 01/05/2023 09:35 AM    CREATININE 1.0 01/05/2023 09:35 AM    GFRAA >60 07/15/2022 07:11 AM    GFRAA >60 11/21/2012 12:51 PM    AGRATIO 1.6 12/09/2022 10:52 AM    LABGLOM >60 01/05/2023 09:35 AM    GLUCOSE 86 01/05/2023 09:35 AM    PROT 7.0 12/09/2022 10:52 AM    CALCIUM 10.0 01/05/2023 09:35 AM    BILITOT <0.2 12/09/2022 10:52 AM    ALKPHOS 113 12/09/2022 10:52 AM    AST 20 12/09/2022 10:52 AM    ALT 25 12/09/2022 10:52 AM       POC Tests:   Recent Labs     01/17/23  0616   POCGLU 99       Coags:   Lab Results   Component Value Date/Time    PROTIME 12.8 01/05/2023 09:35 AM    INR 0.97 01/05/2023 09:35 AM    APTT 23.6 01/05/2023 09:35 AM       HCG (If Applicable): No results found for: PREGTESTUR, PREGSERUM, HCG, HCGQUANT     ABGs: No results found for: PHART, PO2ART, FTF8QXT, PTK2SHT, BEART, [id number]     Type & Screen (If Applicable):  No results found for: LABABO, LABRH    Drug/Infectious Status (If Applicable):  No results found for: HIV, HEPCAB    COVID-19 Screening (If Applicable): No results found for: COVID19        Anesthesia Evaluation  Patient summary reviewed no history of anesthetic complications:   Airway: Mallampati: III  TM distance: >3 FB   Neck ROM: full  Mouth opening: > = 3 FB   Dental: normal exam         Pulmonary:normal exam  breath sounds clear to auscultation  (+) sleep apnea:      (-) COPD and asthma                           Cardiovascular:  Exercise tolerance: good (>4 METS),   (+) hypertension:,     (-) CAD, angina and  MARTIN      Rhythm: regular  Rate: normal                    Neuro/Psych:   (+) neuromuscular disease:,    (-) seizures and TIA           GI/Hepatic/Renal:   (+) GERD:,      (-) liver disease and no renal disease       Endo/Other:    (+) malignancy/cancer. (-) diabetes mellitus               Abdominal:             Vascular: negative vascular ROS. Other Findings:           Anesthesia Plan      general     ASA 3     (I discussed with the patient the risks and benefits of PIV, anesthesia, IV Narcotics, PACU. All questions were answered the patient agrees with the plan and wishes to proceed)  Induction: intravenous.                             Fabián Calhoun MD   1/17/2023

## 2023-01-17 NOTE — OP NOTE
Orthopaedic Surgery  Operative Report      Patient Name:  Mayito Barbosa  Patient :  1955  MRN: [id number]    Date: 23     Pre-operative Diagnosis:   M87.051 Avascular necrosis Right hip    Post-operative Diagnosis:    Same    Procedure: RIGHT  43104 Total Hip Arthroplasty, direct anterior  Modifier 22    Surgeon:  Surgeon(s) and Role:     * Lazaro Medley MD - Primary    Assistant: Circulator: Kayla Cuevas RN  Surgical Assistant: Rolo La  Radiology Technologist: Bull Ospina Assistant: Karlene Ramirez RN  Scrub Person First: Arcelia Gilliam    Anesthesia: General endotracheal anesthesia and Intraoperative local infiltration - Exparel/marcaine solution    Estimated blood loss: 200    Specimens:   ID Type Source Tests Collected by Time Destination   1 : HIP TISSUE #1 Joint/Joint Fluid Hip CULTURE, FUNGUS, CULTURE, AERORBIC AND ANAROBIC, JOINT Lazaro Medley MD 2023 0815    2 : HIP TISSUE #2 Joint/Joint Fluid Hip CULTURE, FUNGUS, CULTURE, AERORBIC AND ANAROBIC, JOINT Lazaro Medley MD 2023 6661    3 : RIGHT FEMORAL HEAD Joint/Joint Fluid Hip CULTURE, FUNGUS, CULTURE, AERORBIC AND ANAROBIC, JOINT Lazaro Medley MD 2023 7865        Complications: None    Drains: None    Condition: Stable    Implants:   Implant Name Type Inv. Item Serial No.  Lot No. LRB No. Used Action   LINER ACET HW F 40 MM VIVACIT-E G7 - [id number]  LINER ACET HW F 40 MM VIVACIT-E G7  [employer]- [id number] Right 1 Implanted   SHELL ACET SZ F [id number] H OSSEOTI LIMIT H 2 MOBILITY G7 - [id number]  SHELL ACET SZ F [id number] H OSSEOTI LIMIT H 2 MOBILITY G7  [employer]- [id number] Right 1 Implanted   BIOLOX DELTA FEM HEAD 40MM +3. 5MM - [id number]  Virgen Knight FEM HEAD 40MM +3.5MM  [employer]- [id number] Right 1 Implanted   AVENIR COMPLETE HIGH OFFSET COLLARLESS SIZE 5 - [id number]  Avenir Complete High Offset Collarless Size 5  [employer]- [id number] Right 1 Implanted       Findings:   1. End stage OA  2. Collapse after AVN, significant preop leg length inequality, measured approx 1 cm  3. Significant synovitis present in the hip with severely collapsed femoral head  4. Significant acetabular bone loss superior lateral rim    Indications: The patient has been battling right hip pain for months to years. Pain has gotten worse recently. Patient has failed all preoperative conservative treatment options. The activities of daily living have been affected quite a bit. Patient wanted to regain mobility and be active as possible. Patient understood the risk benefits and alternatives in detail and wanted to proceed with the above operation. Procedure Details:   I marked the surgical site of the right hip for surgery. He was taken back to the operating theater laid supine the table the bony prominences well-padded. General anesthesia was induced. We transferred the patient to the operating table, Geraldine bed. X-ray was acquired marking the right hip as the preoperative templating hip. Antibiotics 2 g of Ancef were given. MRSA swab testing was performed preop, and weight-based vancomycin was given in addition. Tranexamic acid 1 g was given at start. We began with a direct anterior approach of the hip. Parosns-Saenz interval was taken. We incised the tensor fascia alessia. We bluntly developed a plane between that and the rectus. Lateral edge of the rectus fascia was incised the muscle belly was retracted medially. The underlying fascia was also incised. Underlying vessels lateral circumflex were tied off on each end with silk suture. Electro Bovie cautery was then used to incise through the capsule. A femoral neck osteotomy was performed based on preoperative template. Using a corkscrew device we removed the existing head ball. I took a series of 2 synovial specimens as well as the femoral head itself.   I sent off these 3 cultures for aerobic, anaerobic, AFB, and fungal cultures. This was done purely as a precaution in the setting of avascular necrosis that is collapsed. We then placed retractors around the acetabulum. I cleaned out the pelvic tissue as well as the existing labrum. Sequential reaming was then performed. We stopped at the appropriately sized reamer and impacted the real acetabular shell. X-ray confirmed that the socket was in acceptable position based off of a good AP pelvis x-ray. High-wall liner was used to promote hip stability. We then turned our attention to the femoral exposure. The Raleigh table femoral elevating hook was then placed just inside the fascia but lateral to the vastus. This went around the posterior edge of the femur. Leg was then dropped to the floor and abducted. 90 degrees of external rotation was achieved. We then performed small capsulotomy posteriorly to place a 1 #1 retractor. Placed a #3 Rhonda retractor medially. I use the table hook to elevate the femur for exposure. I externally rotated more to deliver the femoral neck via the Raleigh table. The femur was prepped using a box chisel, canal finder and sequential broaching only. We are happy with the appropriately sized stem. Trial was then placed with a +3.5 head ball first.  The hip was then reduced using standard technique. X-ray confirmed the implants were in reasonable position. We then redislocated and remove the existing trial and implanted the real stem femoral component. I upsized the broach by 2 sizes and gained a few more millimeters in length compared to the trial x-ray. +3.5 head ball was then inserted and impacted. Hip reduction was then performed. We performed anterior and posterior hip stability checks which were successful. We also performed shuck test which is very acceptable with the existing tension. We performed sequential closure.   I irrigated the wound thoroughly with 3 L normal saline. I placed 2 g of vancomycin powder around the wound. I also injected a mixture of Marcaine and Exparel into the perioperative field. Adequate hemostasis was achieved. #2 Ethibond approximated the capsular leaflets. #2 Quill suture approximated the fascial layer as well as the deep subcutaneous layer to remove dead space. 2-0 Vicryl interrupted sutures closed the subcutaneous tissue layer. Monocryl subcuticular suture was then applied. Dermabond Prineo was then used with a nonadhesive dressing. Patient then was reversed in general esthesia transferred back the postoperative care unit without any complications. All instrument counts were correct x2. I was present throughout the entire to the case with the exception of skin closure. Modifier 22: Increased time and complexity  This case took approximately 30% more time than a standard hip replacement. The degree of avascular necrosis, and the secondary collapse altered the surgical field, as well as accounting for significant amount of acetabular bone loss. Extra time and care was taken for instrumenting the acetabulum and placing the appropriate socket. In addition, extra time was taken to restore length and offset in the setting of altered anatomy compared to the contralateral hip. This, in no way, signifies that the ultimate outcome was compromised in any way. PLAN:  - WBAT with assist device  - aspirin 81 mg BID  - ambulate postop with PT  - resume home meds, diet  - f/u scheduled with me in 2-3 weeks  - dispo: Plan for likely discharge today, therapy in PACU.   If he fails therapy however, he can stay inpatient stay  - Follow-up postop cultures, will do extended antibiotic oral prophylaxis due to high risk      Electronically signed by Nataly Monsalve MD on 1/17/2023 at 10:19 AM

## 2023-01-17 NOTE — H&P
Update History & Physical     The patient's History and Physical of 1/5/2022 was reviewed with the patient and I examined the patient. There was no change. The surgical site was confirmed by the patient and me. Plan: The risks, benefits, expected outcome, and alternative to the recommended procedure have been discussed with the patient / family. Patient understands and wants to proceed with the procedure.       Electronically signed by Bo Logan MD on 1/17/2023 at 7:05 AM

## 2023-01-17 NOTE — PLAN OF CARE
OT evaluation complete. Plan to increase patient's ADLs/functional status and maximize occupational engagement.

## 2023-01-17 NOTE — ANESTHESIA POSTPROCEDURE EVALUATION
Department of Anesthesiology  Postprocedure Note    Patient: Alonso Douglas  MRN: [id number]  YOB: 1955  Date of evaluation: 1/17/2023      Procedure Summary     Date: 01/17/23 Room / Location: [address] [place] [address] [address] / [place]    Anesthesia Start: 0730 Anesthesia Stop: 8821    Procedure: RIGHT TOTAL HIP ARTHROPLASTY MINIMALLY INVASIVE DIRECT ANTERIOR     Sylvester Jolly (Right: Hip) Diagnosis:       Primary osteoarthritis of right hip      (RIGHT HIP PRIMARY OSTEOARTHRITIS)    Surgeons:  Sylvester Willson MD Responsible Provider: Yael Lorenzo MD    Anesthesia Type: General ASA Status: 3          Anesthesia Type: General    Francine Phase I: Francine Score: 9    Francine Phase II:        Anesthesia Post Evaluation    Comments: Postoperative Anesthesia Note    Name:    Alonso Douglas  MRN:      [id number]    Patient Vitals in the past 12 hrs:  01/17/23 1332, Pulse:90, SpO2:95 %  01/17/23 1200, BP:129/75, Pulse:75, Resp:16, SpO2:91 %  01/17/23 1130, BP:130/74, Pulse:66, Resp:16, SpO2:98 %  01/17/23 1115, BP:135/81, Pulse:66, Resp:14, SpO2:95 %  01/17/23 1100, BP:125/80, Pulse:67, Resp:16, SpO2:96 %  01/17/23 1045, Resp:16  01/17/23 1009, BP:131/74, Pulse:65, Resp:14, SpO2:95 %  01/17/23 1000, Resp:14  01/17/23 0957, BP:137/80, Pulse:67, Resp:16, SpO2:94 %  01/17/23 0952, Pulse:79, Resp:11, SpO2:93 %  01/17/23 0947, BP:137/81, Temp:97.3 °F (36.3 °C), Temp src:Temporal, Pulse:72, Resp:16, SpO2:94 %  01/17/23 0945, BP:97/61, Pulse:71, Resp:18, SpO2:94 %  01/17/23 0559, BP:(!) 142/73, Temp:99.1 °F (37.3 °C), Temp src:Temporal, Pulse:74, Resp:22, SpO2:98 %     LABS:    CBC  Lab Results       Component                Value               Date/Time                  WBC                      6.8                 01/05/2023 09:35 AM        HGB                      12.5 (L)            01/05/2023 09:35 AM        HCT                      36.0 (L)            01/05/2023 09:35 AM        PLT 224                 01/05/2023 09:35 AM   RENAL  Lab Results       Component                Value               Date/Time                  NA                       138                 01/05/2023 09:35 AM        K                        4.5                 01/05/2023 09:35 AM        K                        4.9                 05/03/2020 09:00 AM        CL                       101                 01/05/2023 09:35 AM        CO2                      27                  01/05/2023 09:35 AM        BUN                      18                  01/05/2023 09:35 AM        CREATININE               1.0                 01/05/2023 09:35 AM        GLUCOSE                  86                  01/05/2023 09:35 AM   COAGS  Lab Results       Component                Value               Date/Time                  PROTIME                  12.8                01/05/2023 09:35 AM        INR                      0.97                01/05/2023 09:35 AM        APTT                     23.6                01/05/2023 09:35 AM     Intake & Output:  @99WHLT@    Nausea & Vomiting:  No    Level of Consciousness:  Awake    Pain Assessment:  Adequate analgesia    Anesthesia Complications:  No apparent anesthetic complications    SUMMARY      Vital signs stable  OK to discharge from Stage I post anesthesia care.   Care transferred from Anesthesiology department on discharge from perioperative area

## 2023-01-17 NOTE — DISCHARGE INSTR - DIET

## 2023-01-17 NOTE — PROGRESS NOTES
Patient remains in Phase II. Currently being evaluated by Pt/OT. Stated he continues to feels dizzy at times. Will continue to monitor.  Rebecca Antonio RN

## 2023-01-17 NOTE — PROGRESS NOTES
Physical Therapy  Facility/Department: [facility]  Physical Therapy Initial Assessment/Treatment     Name: Alonso Douglas  : 1955  MRN: [id number]  Date of Service: 2023    Discharge Recommendations:  24 hour supervision or assist   PT Equipment Recommendations  Equipment Needed: No  Other: Has SW      Patient Diagnosis(es): The encounter diagnosis was Primary osteoarthritis of right hip. Past Medical History:  has a past medical history of Arthritis, CTS (carpal tunnel syndrome), Erectile dysfunction, GERD (gastroesophageal reflux disease), Hyperglycemia, Hyperlipidemia, Hypertension, Kidney stone, Malignant neoplasm of prostate ([facility] [address] AJ treated with BiPAP, Osteoarthritis, Trigger thumb, and Vitamin D deficiency. Past Surgical History:  has a past surgical history that includes Appendectomy; Hemorrhoid surgery; back surgery; joint replacement; Carpal tunnel release (); Total shoulder arthroplasty (Left, 13); Cystoscopy (Right, 2016); other surgical history (10/12/2016); Prostatectomy; Colonoscopy (2018); and pr colonoscopy flx dx w/collj spec when pfrmd (N/A, 8/15/2018). Assessment   Body Structures, Functions, Activity Limitations Requiring Skilled Therapeutic Intervention: Decreased functional mobility ; Decreased endurance;Decreased strength;Decreased ROM; Decreased balance;Decreased posture  Assessment: Pt to [facility] for [facility] on 23/ PTA pt lives in multilevel house with wife, bed/bathroom is on main floor, pt as 2 AWAIS with BHR. Pt currently require min A for supine>sit, CGA for transfers, CGA for ambulation, and CGA for curb step navigation. Pt with dizziness throughout session, BP remianed stable. Pt presents below baseline function and will benefit from further skilled PT services in acute setting in order to address functional deficits. Safe to DC home with 24/7 assist when deemed medically appropriate.   Therapy Prognosis: Good  Decision Making: Medium Complexity  Barriers to Learning: None  Requires PT Follow-Up: Yes  Activity Tolerance  Activity Tolerance: Patient tolerated evaluation without incident     Plan   Physcial Therapy Plan  General Plan: 2 times a day 7 days a week  Current Treatment Recommendations: Strengthening, ROM, Balance training, Gait training, Home exercise program, Safety education & training, Stair training, Functional mobility training, Patient/Caregiver education & training, Therapeutic activities, Transfer training, Neuromuscular re-education, Positioning, Endurance training, Pain management  Safety Devices  Type of Devices: Patient at risk for falls, Left in bed, Gait belt, Nurse notified (Left sitting EOB with OT)  Restraints  Restraints Initially in Place: No     Restrictions  Restrictions/Precautions  Restrictions/Precautions: Weight Bearing, Fall Risk  Required Braces or Orthoses?: No  Lower Extremity Weight Bearing Restrictions  Right Lower Extremity Weight Bearing: Weight Bearing As Tolerated  Position Activity Restriction  Hip Precautions: Anterior hip precautions  Other position/activity restrictions: no active SLR     Subjective   Pain: Denies pain  General  Chart Reviewed: Yes  Patient assessed for rehabilitation services?: Yes  Response To Previous Treatment: Not applicable  Family / Caregiver Present: No  Referring Practitioner: Annmarie Duggan PA-C  Referral Date : 01/17/23  Follows Commands: Within Functional Limits         Social/Functional History  Social/Functional History  Lives With: Spouse  Type of Home: House  Home Layout: Able to Live on Main level with bedroom/bathroom, Multi-level  Home Access: Stairs to enter with rails  Entrance Stairs - Number of Steps: 2 AWAIS  Entrance Stairs - Rails: Both  Bathroom Shower/Tub: Tub/Shower unit  Bathroom Toilet: Handicap height  Bathroom Equipment: Grab bars in [address]: loraine Goldberg Darry Doctor  Has the patient had two or more falls in the past year or any fall with injury in the past year?: No  ADL Assistance: Independent  Homemaking Assistance: Independent  Homemaking Responsibilities: No (Wife completes)  Ambulation Assistance: Independent (HAs been using SW for dat past month, prior to that used cane PRN)  Transfer Assistance: Independent (Wife started helping last 3 weeks due to hip pain)  Active : Yes  Occupation: Full time employment  Type of Occupation: [address]: go to [employer] shops    Vision/Hearing  Vision  Vision: Impaired  Vision Exceptions: Wears glasses at all times  Hearing  Hearing: Exceptions to [facility]  Hearing Exceptions: Hard of hearing/hearing concerns      Cognition   Orientation  Overall Orientation Status: Within Normal Limits  Orientation Level: Oriented X4  Cognition  Overall Cognitive Status: WNL     Objective   Heart Rate: 89  Heart Rate Source: Monitor  BP: 126/87  BP Location: Left upper arm  BP Method: Automatic  Patient Position: Semi fowlers  MAP (Calculated): 100  Resp: 17  SpO2: 94 %  O2 Device: None (Room air)     Observation/Palpation  Posture: Fair  Gross Assessment  AROM: Within functional limits  PROM: Within functional limits  Strength: Generally decreased, functional     Bed Mobility Training  Bed Mobility Training: Yes  Supine to Sit: Other (comment) (pt received at EOB w/ PT)  Sit to Supine: Stand-by assistance; Additional time (HOB elevated)  Scooting: Stand-by assistance (towards EOB)  Balance  Sitting: Intact  Standing: Impaired  Standing - Static: Constant support; Fair  Standing - Dynamic: Constant support; Fair  Transfer Training  Transfer Training: Yes  Overall Level of Assistance: Contact-guard assistance  Sit to Stand: Contact-guard assistance; Additional time; Adaptive equipment (w/ SW)  Stand to Sit: Contact-guard assistance; Adaptive equipment; Additional time (w/ SW)  Toilet Transfer: Contact-guard assistance;Assist X1;Adaptive equipment (With RW and use of L grab bar)  Car Transfer: Contact-guard assistance; Adaptive equipment; Additional time (simulated; w/ SW)  Gait Training  Gait Training: Yes  Gait  Overall Level of Assistance: Contact-guard assistance; Adaptive equipment; Additional time (pt ambulated ~40ft w/ SW to simulate distance pt is req to ambulate from chair>front door)  Interventions: Tactile cues; Safety awareness training;Verbal cues  Base of Support: Shift to left  Speed/Darby: Slow;Pace decreased (< 100 feet/min)  Step Length: Left shortened  Stance: Left increased  Gait Abnormalities: Decreased step clearance; Step to gait  Distance (ft): 20 Feet (20+20)  Assistive Device: Walker;Gait belt  Curbs/Ramps: Contact-guard assistance (2 curb steps with SW and CGA)    OutComes Score  AM-PAC Score  AM-PAC Inpatient Mobility Raw Score : 18 (01/17/23 1420)  AM-PAC Inpatient T-Scale Score : 43.63 (01/17/23 1420)  Mobility Inpatient CMS 0-100% Score: 46.58 (01/17/23 1420)  Mobility Inpatient CMS G-Code Modifier : CK (01/17/23 1420)    Goals  Short Term Goals  Time Frame for Short Term Goals: 1/24/23  Short Term Goal 1: pt will complete bed mobility with mod I  Short Term Goal 2: pt will compelte transfers with mod I  Short Term Goal 3: pt will ambualte 100ft with LRAD and SBA  Short Term Goal 4: pt will navigate up<>down 2 steps with BHR and SBA  Short Term Goal 5: pt will participate in 10-12 reps of BLE exericses. Additional Goals?: No  Patient Goals   Patient Goals : \"I want to get up and walk to the bathroom with my walker\"       Education  Patient Education  Education Given To: Patient; Family  Education Provided: Role of Therapy;Plan of Care;Home Exercise Program;Transfer Training;Precautions; Equipment; Family Education  Education Provided Comments: pt and pt wife with multiple questions throughout session; all questions answered.  educated on ambualtion with AD, transfers, HEP, and precautions -- verbalizes and demos understanding  Education Method: Printed Information/Hand-outs; Verbal;Demonstration  Barriers to Learning: None  Education Outcome: Verbalized understanding;Demonstrated understanding    Therapy Time   Individual Concurrent Group Co-treatment   Time In 1310         Time Out 1400         Minutes 50         Timed Code Treatment Minutes: 40 Minutes (10 min eval)       Tessy Abrams PT, DPT    If pt is unable to be seen after this session, please let this note serve as discharge summary. Please see case management note for discharge disposition. Thank you.

## 2023-01-17 NOTE — PROGRESS NOTES
Waiting on PT/OT. Then will assess patient discharge/staying. Patient is resting quietly. Has had snacks and drinks. Pain tolerable. Nurse at bedside.

## 2023-01-18 ENCOUNTER — TELEPHONE (OUTPATIENT)
Dept: ORTHOPEDIC SURGERY | Age: 68
End: 2023-01-18

## 2023-01-18 LAB
FUNGUS STAIN: NORMAL

## 2023-01-19 NOTE — TELEPHONE ENCOUNTER
Spoke with pt, doing pretty good. Incision status: No drainage or redness    Edema/Swelling/Teds: Doesn't notice a lot of swelling. Pain level and status: Yesterday was rough and today has been better. Use of pain medications: Using as needed. Blood thinner: ASA 81mg BID- no issues    Bowels: No BM, taking stool softener    Home Care Agency active: NA    Outpatient therapy: NA    Do you have all of your medications: Yes    Changes in medications: no    Instructed pt to call Nurse Navigator or surgeon's office with any questions or concerns.      Follow up appointments:    Future Appointments   Date Time Provider Aislinn Cardona   2/7/2023 10:15 AM Petr Guerrero PA-C [facility]   2/16/2023 10:00 AM Kevin Yee, PT [facility]   4/3/2023  8:20 AM KARLOS Naidu CNP [facility]   4/11/2023  8:20 AM Germain Da Silva MD [place]

## 2023-01-22 LAB
CULTURE, JOINT AEROBIC: NORMAL
CULTURE, JOINT ANAEROBIC: NORMAL
GRAM STAIN RESULT: NORMAL

## 2023-01-24 ENCOUNTER — TELEPHONE (OUTPATIENT)
Dept: ORTHOPEDIC SURGERY | Age: 68
End: 2023-01-24

## 2023-01-24 NOTE — TELEPHONE ENCOUNTER
General Question     Subject: TOTAL RT HIP COMPLICATIONS  Patient and /or Facility Request: Anh Gonsalves  Contact Number: [phone]    PATIENT CALLING REGARDING RT HIP REPLACEMENT ON Monday January 17,2023    PATIENT HAS BEEN HAVING UPSET STOMACH FOR A DAY OR SO AND HE HAD NOT SLEPT IN ABOUT A WEEK     SENT TO TRIAGE TEAM

## 2023-01-24 NOTE — TELEPHONE ENCOUNTER
Spoke to patient. Very vague symptoms. He has not been vomiting. He is able to pass gas and has had normal bowel movements. I suggested possibly ordering a CBC to make sure he was not anemic but patient did not seem thrilled about this. I have recommended bland food such as chicken broth and crackers. If he continues to have problems I would have him contact his primary care physician.   He may simply be related to all the medications he is taking

## 2023-01-24 NOTE — TELEPHONE ENCOUNTER
Did speak to patient regarding onset of upset starting this past Saturday. Has not vomited, just upset stomach. Is unsure if this is related to his surgery. Is able to eat some. Did reach out [facility] regarding patient.

## 2023-01-25 ENCOUNTER — TELEPHONE (OUTPATIENT)
Dept: ORTHOPEDIC SURGERY | Age: 68
End: 2023-01-25

## 2023-01-25 NOTE — TELEPHONE ENCOUNTER
Spoke with pt, doing pretty well. Having some stomach issues but hip is doing.     Incision status: No drainage or redness    Edema/Swelling/Teds: Minimal swelling.     Pain level and status: Pain is pretty manageable    Use of pain medications: Hasn't used any all weekend because of stomach upset. Just started this weekend.     Blood thinner: ASA 81mg     Bowels: Was constipated after surgery and now moving fine.     Home Care Agency active: NA    Outpatient therapy: NA    Do you have all of your medications: Yes    Changes in medications: no    Instructed pt to call Nurse Navigator or surgeon's office with any questions or concerns.   Signed off from pt.  Instructed pt to call RN or Surgeon's office with any issues or concerns.    Follow up appointments:    Future Appointments   Date Time Provider Department [place]   2/7/2023 10:15 AM Durga Asher PA-C [facility]   2/16/2023 10:00 AM Yasmin Yee PT [facility]   4/3/2023  8:20 AM KARLOS Romero - CNP [facility]   4/11/2023  8:20 AM Narciso Moe MD [place]  [place]

## 2023-01-26 ENCOUNTER — TELEPHONE (OUTPATIENT)
Dept: FAMILY MEDICINE CLINIC | Age: 68
End: 2023-01-26

## 2023-01-26 ENCOUNTER — TELEPHONE (OUTPATIENT)
Dept: ORTHOPEDIC SURGERY | Age: 68
End: 2023-01-26

## 2023-01-26 NOTE — TELEPHONE ENCOUNTER
Melatonin may help with sleep, but it seems he needs an appointment to discuss his symptoms. Did he call his surgeon about his symptoms at all? I do not have any openings today.

## 2023-01-26 NOTE — TELEPHONE ENCOUNTER
Spoke with spouse and advised on message.  She did say that they called surgeon and they advised to contact his PCP.  Patient is not is that much pain, she states that he needs to be able to sleep.  Asking if Tylenol PM would work better then the Melatonin

## 2023-01-26 NOTE — TELEPHONE ENCOUNTER
Spoke with Satinder Mcelroy and she  will try him on the Melatonin first to see how it works.   She also out a call out to the surgeon's office

## 2023-01-26 NOTE — TELEPHONE ENCOUNTER
Tylenol PM has Benadryl in it. He may take this, but may cause dry mouth. Recommend only taking it nightly for a few nights only and if no improvement, recommend calling back to the office.

## 2023-01-26 NOTE — TELEPHONE ENCOUNTER
Pt left RN voicemail about stomach issues. States after surgery he was constipated, was using clearlax and now bowels are almost looser. Stomach issues started Saturday afternoon. Pt states leg muscles are cramping he believes it's because he isn't sleeping. Really having trouble sleeping. RN will message office to notify them of patient's issues.    Justine Tapia   Orthopedic Nurse Navigator   Phone number: [phone]

## 2023-01-26 NOTE — TELEPHONE ENCOUNTER
Pt's wife called and stated that pt is having some stomach issues. Just had a total hip replacement on 1/17/23 not taking his pain meds. States that he can't eat or sleep a little nauseated, he has been able to go to the bathroom with no problem. Wife was wondering if melatonin would help for the sleeping problem. He uses [address] [place] [address].

## 2023-01-31 RX ORDER — ENALAPRIL MALEATE 20 MG/1
TABLET ORAL
Qty: 60 TABLET | Refills: 0 | Status: SHIPPED | OUTPATIENT
Start: 2023-01-31

## 2023-02-07 ENCOUNTER — OFFICE VISIT (OUTPATIENT)
Dept: ORTHOPEDIC SURGERY | Age: 68
End: 2023-02-07

## 2023-02-07 VITALS — BODY MASS INDEX: 33.36 KG/M2 | WEIGHT: 233 LBS | HEIGHT: 70 IN

## 2023-02-07 DIAGNOSIS — Z96.641 STATUS POST RIGHT HIP REPLACEMENT: Primary | ICD-10-CM

## 2023-02-07 PROCEDURE — 99024 POSTOP FOLLOW-UP VISIT: CPT | Performed by: PHYSICIAN ASSISTANT

## 2023-02-07 NOTE — PROGRESS NOTES
Dr John Herman      Date /Time 2/7/2023       10:10 AM EST  Name Darling Zambrano             1955   Location  [facility]  MRN [id number]                Chief Complaint   Patient presents with    Follow-up     1st PO Right GENIE SX 01/17/2023        History of Present Illness      Darling Zambrano is a 75 y.o. male is here for post-op visit after RIGHT  57570 Total Hip Arthroplasty      Patient is 3 weeks status post right total hip arthroplasty. Patient had an anterior approach. Patient doing well. Patient denies any fever, chills, or drainage. Pain controlled. Physical Exam    Based off 1997 Exam Criteria    Ht 5' 10\" (1.778 m)   Wt 233 lb (105.7 kg)   BMI 33.43 kg/m²      Constitutional:       General: He is not in acute distress. Appearance: Normal appearance. RIGHT Hip: incision clean, intact, healing appropriately. No surrounding  erythema or fluctuance. Neuro intact distal. No evidence of DVT. Imaging       Right Hip: [facility] [place]  Radiographs: X-rays were ordered and reviewed of the right hip.  3 views. AP pelvis, lateral, and false profile. They demonstrate a right total hip arthroplasty in good position. No evidence of loosening or periprosthetic fracture. Assessment and Plan    Suad Garcia was seen today for follow-up. Diagnoses and all orders for this visit:    Status post right hip replacement  -     XR HIP RIGHT (2-3 VIEWS); Future  -     [facility]      Patient doing well. Patient does present today without supportive devices. I have asked him to go back and at least use a cane. Start physical therapy in 1 week along the anterior hip protocol. Follow-up with Dr. Veronica Talbert in 3 months or sooner problems arise. Electronically signed by Kaylie Tony PA-C on 2/7/2023 at 10:10 AM  This dictation was generated by voice recognition computer software.   Although all attempts are made to edit the dictation for accuracy, there may be errors in the transcription that are not intended.

## 2023-02-08 RX ORDER — NIACIN 1000 MG/1
TABLET, EXTENDED RELEASE ORAL
Qty: 90 TABLET | Refills: 3 | Status: SHIPPED | OUTPATIENT
Start: 2023-02-08

## 2023-02-08 NOTE — TELEPHONE ENCOUNTER
Future Appointments   Date Time Provider Aislinn Cardona   2/16/2023 10:00 AM Messi Paez, PT [facility]   4/3/2023  8:20 AM KARLOS Ko - [facility]   4/11/2023  8:20 AM MD Craig Sanders  [place] - [place]   4/25/2023 10:00 AM Yusuf Steel MD [facility]

## 2023-02-16 ENCOUNTER — HOSPITAL ENCOUNTER (OUTPATIENT)
Dept: PHYSICAL THERAPY | Age: 68
Setting detail: THERAPIES SERIES
Discharge: HOME | End: 2023-02-16
Payer: MEDICARE

## 2023-02-16 PROCEDURE — 97161 PT EVAL LOW COMPLEX 20 MIN: CPT

## 2023-02-16 PROCEDURE — 97140 MANUAL THERAPY 1/> REGIONS: CPT

## 2023-02-16 PROCEDURE — 97110 THERAPEUTIC EXERCISES: CPT

## 2023-02-16 NOTE — FLOWSHEET NOTE
[facility], [address] [place] [address] eKenan Denney, [address]  Phone: [phone], [phone]    Physical Therapy Treatment Note/ Progress Report:     Date:  2023    Patient Name:  Jacolyn Goldmann    :  1955  MRN: [id number]  Restrictions/Precautions:    Medical/Treatment Diagnosis Information:  Diagnosis: R Hip Avascular Necrosis (M87.051)  and OA (M16.11) s/p GENIE (O32.303)79   Treatment Diagnosis: Abnormalities of Gait (R26.89)               Insurance/Certification information:  PT Insurance Information: Medicare/[place] Medicare Supplement  Physician Information:  Referring Provider (secondary):  Juan Jose Nava  Has the plan of care been signed (Y/N):        []  Yes  [x]  No     Date of Patient follow up with Physician:     Is this a Progress Report:     []  Yes  [x]  No      If Yes:  Date Range for reporting period:  Initial Eval: 2023  Beginnin2023 --- Ending: 3/18/2023    Progress report will be due (10 Rx or 30 days whichever is less):      Recertification will be due (POC Duration  / 90 days whichever is less): 2023      Visit # Insurance Allowable Auth Required   In Person 1 Med Nec  []  Yes     []  No    Tele Health -  []  Yes     []  No    Total 1       Functional Test Score: LEFS :38 % (Total: 50/80)  Date assessed: 2023      Latex Allergy:  [x]NO      []YES    Preferred Language for Healthcare:   [x]English       []other:    Pain level:  0/10     SUBJECTIVE:  See eval    OBJECTIVE: See eval  Observation:   Test measurements:      RESTRICTIONS/PRECAUTIONS: R GENIE Anterior     Exercises/Interventions:   Therapeutic Ex (58195)  Therapeutic Activity (94391)  NMR re-education (57070) Sets/Reps Notes/CUES   Bike          Glut Sets 10 x 10\"    Quad Sets 10 x 10\"    Hamstring Sets  10 x 10\"         PPT 10 x 10\"         Heel Slides 2 x 10    SAQ 2 x 10         HL Hip ADD Squeeze  20 x 5\"         LAQ 20 x     Seated HS Stretch  3 x 30\" Standing HR 20 x     Standing HSC  20 x ea                                                      Manual Intervention (39860)     Right Hip Circumduction 5'    DTM to thigh  5'    Right Hip AROM 5'                        Samantha Saver access code:   Access Code: [email or web address]  URL: [email or web address]  Date: 02/16/2023  Prepared by: Francheska Alvarado    Exercises  Supine Gluteal Sets - 1 x daily - 7 x weekly - 3 sets - 10 reps  Supine Quad Set - 1 x daily - 7 x weekly - 3 sets - 10 reps  Supine Posterior Pelvic Tilt - 1 x daily - 7 x weekly - 3 sets - 10 reps  Supine Knee Extension Strengthening - 1 x daily - 7 x weekly - 3 sets - 10 reps  Supine Hip Adduction Isometric with Ball - 1 x daily - 7 x weekly - 3 sets - 10 reps  Supine Heel Slide - 1 x daily - 7 x weekly - 3 sets - 10 reps  Seated Long Arc Quad - 1 x daily - 7 x weekly - 3 sets - 10 reps  Standing Heel Raise with Support - 1 x daily - 7 x weekly - 3 sets - 10 reps  Standing Alternating Knee Flexion - 1 x daily - 7 x weekly - 3 sets - 10 reps               Patient Education 10' Pt education with HEP and progression of PT along with compliance with HEP to aide with formal PT for optimal outcomes. Therapeutic Exercise and NMR EXR  [x] (30452) Provided verbal/tactile cueing for activities related to strengthening, flexibility, endurance, ROM for improvements in LE, proximal hip, and core control with self care, mobility, lifting, ambulation. [x] (37356) Provided verbal/tactile cueing for activities related to improving balance, coordination, kinesthetic sense, posture, motor skill, proprioception to assist with LE, proximal hip, and core control in self-care, mobility, lifting, ambulation and eccentric single leg control.      NMR and Therapeutic Activities:    [x] (99766 or 89041) Provided verbal/tactile cueing for activities related to improving balance, coordination, kinesthetic sense, posture, motor skill, proprioception and motor activation to allow for proper function of core, proximal hip and LE with self-care and ADLs and functional mobility. [x] (68452) Gait Re-education- Provided training and instruction to the patient for proper LE, core and proximal hip recruitment and positioning and eccentric body weight control with ambulation re-education including up and down stairs     Home Exercise Program:    [x] (79230) Reviewed/Progressed HEP activities related to strengthening, flexibility, endurance, ROM of core, proximal hip and LE for functional self-care, mobility, lifting and ambulation/stair navigation   [x] (05152) Reviewed/Progressed HEP activities related to improving balance, coordination, kinesthetic sense, posture, motor skill, proprioception of core, proximal hip and LE for self-care, mobility, lifting, and ambulation/stair navigation      Manual Treatments:  PROM / STM / Oscillations-Mobs:  G-I, II, III, IV (PA's, Inf., Post.)  [x] (73765) Provided manual therapy to mobilize LE, proximal hip and/or LS spine soft tissue/joints for the purpose of modulating pain, promoting relaxation, increasing ROM, reducing/eliminating soft tissue swelling/inflammation/restriction, improving soft tissue extensibility and allowing for proper ROM for normal function with self-care, mobility, lifting and ambulation. Modalities:     [] GAME READY (VASO)- for significant edema, swelling, pain control.      Charges:  Timed Code Treatment Minutes: 40'   Total Treatment Minutes:  60'   BWC:  TE TIME:  NMR TIME:  MANUAL TIME:  UNTIMED MINUTES:  Medicare Total:    -  -  -  -  Visits: 1  Total: $130      [x] EVAL (LOW) 92852 (typically 20 minutes face-to-face)  [] EVAL (MOD) 89268 (typically 30 minutes face-to-face)  [] EVAL (HIGH) 90166 (typically 45 minutes face-to-face)  [] RE-EVAL     [x] CI(46201) x   2  [] IONTO  [] NMR (99395) x     [] VASO  [x] Manual (53188) x  1   [] Other:  [] TA x      [] Mech Traction (20614)  [] ES(attended) (39709)      [] ES (un) (99787):    ASSESSMENT SUMMARY:  Patient is a 75 y.o. male reporting to OP PT with c/c of decreased strength and endurance, difficulty standing for long duration, and difficulty finding comfortable position to sleep which has been occurring over the past year  Patient underwent Anterior GENIE on 1/17/2023. Pt is noted to have decreased strength, ROM, and overall decrease of functional mobility. Patient received education on their current pathology and how their condition effects them with their functional activities. Patient understood discussion and questions were answered. Patient understands their activity limitations and understands rational for treatment progression. Pt educated on plan of care and HEP, if worsening symptoms to d/c that exercise. 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 Independent in HEP and progression per patient tolerance, in order to prevent re-injury. [] Progressing: [] Met: [] Not Met: [] Adjusted   2. Patient will have a decrease in pain of NRPS to facilitate improvement in movement, function, and ADLs as indicated by Functional Deficits. [] Progressing: [] Met: [] Not Met: [] Adjusted      Long Term Goals: To be achieved in: 12 weeks  Functional Scale Test LEFS score will be </= 15% to assist with reaching prior level of function. [] Progressing: [] Met: [] Not Met: [] Adjusted   2. Patient will demonstrate increased AROM of R Hip to [facility] to allow for proper joint functioning as indicated by patients Functional Deficits. [] Progressing: [] Met: [] Not Met: [] Adjusted   3. Patient will demonstrate an increase in Strength of R Hip to 4+/5 to be able to ascend/descend stairs allow for proper functional mobility as indicated by patients Functional Deficits. [] Progressing: [] Met: [] Not Met: [] Adjusted   4.  Patient will return to functional ascending and descending 8\" step activities with NRPS of </= 1-2/10. [] Progressing: [] Met: [] Not Met: [] Adjusted   5. Patient will tolerate standing position for >/=  (patient specific functional goal)    [] Progressing: [] Met: [] Not Met: [] Adjusted                        Overall Progression Towards Functional goals/ Treatment Progress Update:  [] Patient is progressing as expected towards functional goals listed. [] Progression is slowed due to complexities/Impairments listed. [] Progression has been slowed due to co-morbidities. [x] Plan just implemented, too soon to assess goals progression <30days   [] Goals require adjustment due to lack of progress  [] Patient is not progressing as expected and requires additional follow up with physician  [] Other    Prognosis for POC: [x] Good [] Fair  [] Poor    Patient requires continued skilled intervention: [x] Yes  [] No    Treatment/Activity Tolerance:  [x] Patient able to complete treatment  [] Patient limited by fatigue  [] Patient limited by pain    [] Patient limited by other medical complications  [] Other:     Return to Play: (if applicable)   []  Stage 1: Intro to Strength   []  Stage 2: Return to Run and Strength   []  Stage 3: Return to Jump and Strength   []  Stage 4: Dynamic Strength and Agility   []  Stage 5: Sport Specific Training     []  Ready to Return to Play, Meets All Above Stages   []  Not Ready for Return to Sports   Comments:                         PLAN: See eval  [] Continue per plan of care [] Alter current plan (see comments above)  [x] Plan of care initiated [] Hold pending MD visit [] Discharge    Electronically signed by:  Valerie Morejon, PT, MPT,ATC, cert DN     Note: If patient does not return for scheduled/ recommended follow up visits, this note will serve as a discharge from care along with most recent update on progress.

## 2023-02-16 NOTE — PLAN OF CARE
[address],  [facility] [address], [address], [place], [address] [facility]  Phone: [phone], [phone]                                                       Physical Therapy Certification    Dear Referring Provider : Nella Grimes ,    We had the pleasure of evaluating the following patient for physical therapy services at [address] [place] [address]. A summary of our findings can be found in the initial assessment below. This includes our plan of care. If you have any questions or concerns regarding these findings, please do not hesitate to contact me at the office phone number checked above.   Thank you for the referral.       Physician Signature:_______________________________Date:__________________  By signing above (or electronic signature), therapists plan is approved by physician    Patient: Julian Course   : 1955   MRN: [id number]  Referring Physician: Referring Provider: Nella Grimes       Evaluation Date: 2023      Medical Diagnosis Information:  Diagnosis: R Hip Avascular Necrosis (M87.051)  and OA (M16.11) s/p GENIE (I65.822)   Treatment Diagnosis: Abnormalities of Gait (R26.89)                                       Insurance information: PT Insurance Information: Medicare/[employer] Medicare Supplement     Precautions/ Contra-indications/Relevant Medical History: Hx CA, Stem Cell tx for Low Back, R GENIE     C-SSRS Triggered by Intake questionnaire (Past 2 wk assessment):   [x] No, Questionnaire did not trigger screening.   [] Yes, Patient intake triggered further evaluation      [] C-SSRS Screening completed  [] PCP notified via Plan of Care  [] Emergency services notified     Latex Allergy:  [x]NO      []YES  Preferred Language for Healthcare:   [x]English       []other:      ASSESSMENT: Patient is a 75 y.o. male reporting to OP PT with c/c of decreased strength and endurance, difficulty standing for long duration, and difficulty finding comfortable position to sleep which has been occurring over the past year  Patient underwent Anterior GENIE on 1/17/2023. Pt is noted to have decreased strength, ROM, and overall decrease of functional mobility. SUBJECTIVE: Patient stated complaint: decreased strength and endurance     Functional Test Score: LEFS :38 % (Total: 50/80)     Pain Scale: Current: 0/10  Easing factors: n/a  Provocative factors: n/a     Type: []Constant   []Intermittent  []Radiating []Localized []other:     Numbness/Tingling: Patient reports some numbness more recently going down in his leg    Occupation/School:Farmer    Living Status/Prior Level of Function: Independent with ADLs and IADLs     OBJECTIVE:     ROM LEFT RIGHT   HIP Flex  50 °    HIP Abd  50 °    HIP Ext     HIP IR     HIP ER     Knee ext     Knee Flex     Ankle PF     Ankle DF     Ankle In     Ankle Ev     Strength  LEFT RIGHT   HIP Flexors  3/5   HIP Abductors  \"   HIP Ext  \"   Hip ER  \"   Knee EXT (quad)  3+/5   Knee Flex (HS)  \"   Ankle DF     Ankle PF     Ankle Inv     Ankle EV          Balance (up to 10 sec) LEFT RIGHT   Feet Together     Off Set Stance     Tandem Stance     Single Limb          Circumference             Reflexes/Sensation:    [x]Dermatomes/Myotomes intact    [x]Reflexes equal and normal bilaterally   []Other:    Joint mobility:    []Normal    [x]Hypo   []Hyper    Palpation: noted tightness throughout the quad muscle belly    Functional Mobility/Transfers: slowed transition between positions     Posture: Forward Flexed Trunk    Bandages/Dressings/Incisions: Surgical incision anterior hip to mid thigh    Gait: (include devices/WB status) WBAT with SPC,  decreased step length on the length with decreased time spent in single leg stance on the right     Orthopedic Special Tests: n/a post-op                        [x] Patient history, allergies, meds reviewed. Medical chart reviewed. See intake form.      Review Of Systems (ROS):  [x]Performed Review of systems (Integumentary, CardioPulmonary, Neurological) by intake and observation. Intake form has been scanned into medical record. Patient has been instructed to contact their primary care physician regarding ROS issues if not already being addressed at this time. Co-morbidities/Complexities (which will affect course of rehabilitation):   []None           Arthritic conditions   []Rheumatoid arthritis (M05.9)  [x]Osteoarthritis (M19.91)   Cardiovascular conditions   [x]Hypertension (I10)  [x]Hyperlipidemia (E78.5)  []Angina pectoris (I20)  []Atherosclerosis (I70)   Musculoskeletal conditions   []Disc pathology   []Congenital spine pathologies   []Prior surgical intervention  []Osteoporosis (M81.8)  []Osteopenia (M85.8)   Endocrine conditions   []Hypothyroid (E03.9)  []Hyperthyroid Gastrointestinal conditions   []Constipation (C87.31)   Metabolic conditions   []Morbid obesity (E66.01)  []Diabetes type 1(E10.65) or 2 (E11.65)   []Neuropathy (G60.9)     Pulmonary conditions   []Asthma (J45)  []Coughing   []COPD (J44.9)   Psychological Disorders  []Anxiety (F41.9)  []Depression (F32.9)   []Other:   [x]Other:  Hx CA Prostate; Stem Cell Tx for Low Back        Barriers to/and or personal factors that will affect rehab potential:              []Age  []Sex              []Motivation/Lack of Motivation                        []Co-Morbidities              []Cognitive Function, education/learning barriers              []Environmental, home barriers              []profession/work barriers  []past PT/medical experience  []other:  Justification:     Falls Risk Assessment (30 days):   [x] Falls Risk assessed and no intervention required.   [] Falls Risk assessed and Patient requires intervention due to being higher risk   TUG score (>12s at risk):     [] Falls education provided, including           ASSESSMENT:   Functional Impairments:     []Noted lumbar/proximal hip/LE joint hypomobility   [x]Decreased LE functional ROM   [x]Decreased core/proximal hip strength and neuromuscular control   [x]Decreased LE functional strength   [x]Reduced balance/proprioceptive control   []other:      Functional Activity Limitations (from functional questionnaire and intake)   [x]Reduced ability to tolerate prolonged functional positions   [x]Reduced ability or difficulty with changes of positions or transfers between positions   [x]Reduced ability to maintain good posture and demonstrate good body mechanics with sitting, bending, and lifting   [x]Reduced ability to sleep   [x] Reduced ability or tolerance with driving and/or computer work   [x]Reduced ability to perform lifting, carrying tasks   [x]Reduced ability to squat   [x]Reduced ability to forward bend   [x]Reduced ability to ambulate prolonged functional periods/distances/surfaces   [x]Reduced ability to ascend/descend stairs   [x]Reduced ability to run, hop, cut or jump   []other:    Participation Restrictions   []Reduced participation in self care activities   [x]Reduced participation in home management activities   [x]Reduced participation in work activities   [x]Reduced participation in social activities. [x]Reduced participation in sport/recreation activities. Classification :    [x]Signs/symptoms consistent with post-surgical status including decreased ROM, strength and function.    []Signs/symptoms consistent with joint sprain/strain   []Signs/symptoms consistent with patella-femoral syndrome   []Signs/symptoms consistent with knee OA/hip OA   []Signs/symptoms consistent with internal derangement of knee/Hip   []Signs/symptoms consistent with functional hip weakness/NMR control      []Signs/symptoms consistent with tendinitis/tendinosis    []signs/symptoms consistent with pathology which may benefit from Dry needling      []other:      Prognosis/Rehab Potential:      []Excellent   [x]Good    []Fair   []Poor    Tolerance of evaluation/treatment:    []Excellent   [x]Good    []Fair   []Poor    Physical Therapy Evaluation Complexity Justification   [x] A history of present problem with:  [] no personal factors and/or comorbidities that impact the plan of care;  []1-2 personal factors and/or comorbidities that impact the plan of care  [x]3 personal factors and/or comorbidities that impact the plan of care  [x] An examination of body systems using standardized tests and measures addressing any of the following: body structures and functions (impairments), activity limitations, and/or participation restrictions;:  [] a total of 1-2 or more elements   [] a total of 3 or more elements   [x] a total of 4 or more elements   [x] A clinical presentation with:  [x] stable and/or uncomplicated characteristics   [] evolving clinical presentation with changing characteristics  [] unstable and unpredictable characteristics;   [x] Clinical decision making of [x] low, [] moderate, [] high complexity using standardized patient assessment instrument and/or measurable assessment of functional outcome. [x] EVAL (LOW) 45548 (typically 20 minutes face-to-face)  [] EVAL (MOD) 76959 (typically 30 minutes face-to-face)  [] EVAL (HIGH) 14683 (typically 45 minutes face-to-face)  [] RE-EVAL     PLAN  Frequency/Duration:  1-2 days per week for 12 weeks:  Interventions:  [x]  Therapeutic exercise including: strength training, ROM, for Lower extremity and core   [x]  NMR activation and proprioception for LE, Glutes and Core   [x]  Manual therapy as indicated for LE, Hip and spine to include: Dry Needling/IASTM, STM, PROM, Gr I-IV mobilizations, manipulation. [x] Modalities as needed that may include: thermal agents, E-stim, Biofeedback, US, iontophoresis as indicated  [x] Patient education on joint protection, postural re-education, activity modification, progression of HEP.           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 Independent in HEP and progression per patient tolerance, in order to prevent re-injury. [] Progressing: [] Met: [] Not Met: [] Adjusted   2. Patient will have a decrease in pain of NRPS to facilitate improvement in movement, function, and ADLs as indicated by Functional Deficits. [] Progressing: [] Met: [] Not Met: [] Adjusted     Long Term Goals: To be achieved in: 12 weeks  Functional Scale Test LEFS score will be </= 15% to assist with reaching prior level of function. [] Progressing: [] Met: [] Not Met: [] Adjusted   2. Patient will demonstrate increased AROM of R Hip to [facility] to allow for proper joint functioning as indicated by patients Functional Deficits. [] Progressing: [] Met: [] Not Met: [] Adjusted   3. Patient will demonstrate an increase in Strength of R Hip to 4+/5 to be able to ascend/descend stairs allow for proper functional mobility as indicated by patients Functional Deficits. [] Progressing: [] Met: [] Not Met: [] Adjusted   4. Patient will return to functional ascending and descending 8\" step activities with NRPS of </= 1-2/10. [] Progressing: [] Met: [] Not Met: [] Adjusted   5.  Patient will tolerate standing position for >/=  (patient specific functional goal)    [] Progressing: [] Met: [] Not Met: [] Adjusted       Electronically signed by:  Sd Neves, PT , MPT,ATC, cert DN

## 2023-02-21 RX ORDER — CHLORAL HYDRATE 500 MG
CAPSULE ORAL
Qty: 300 CAPSULE | Refills: 0 | Status: SHIPPED | OUTPATIENT
Start: 2023-02-21

## 2023-02-22 ENCOUNTER — HOSPITAL ENCOUNTER (OUTPATIENT)
Dept: PHYSICAL THERAPY | Age: 68
Setting detail: THERAPIES SERIES
Discharge: HOME | End: 2023-02-22
Payer: MEDICARE

## 2023-02-22 PROCEDURE — 97140 MANUAL THERAPY 1/> REGIONS: CPT

## 2023-02-22 PROCEDURE — 97110 THERAPEUTIC EXERCISES: CPT

## 2023-02-22 NOTE — FLOWSHEET NOTE
[facility], [address] [place] [address] [place], [address]  Phone: [phone], [phone]    Physical Therapy Treatment Note/ Progress Report:     Date:  2023    Patient Name:  Олег Escobar    :  1955  MRN: [id number]  Restrictions/Precautions:    Medical/Treatment Diagnosis Information:  Diagnosis: R Hip Avascular Necrosis (M87.051)  and OA (M16.11) s/p GENIE (Q14.706)52   Treatment Diagnosis: Abnormalities of Gait (R26.89)               Insurance/Certification information:  PT Insurance Information: Medicare/[employer] Medicare Supplement  Physician Information:  Referring Provider (secondary):  Jackie Bowers  Has the plan of care been signed (Y/N):        []  Yes  [x]  No     Date of Patient follow up with Physician:     Is this a Progress Report:     []  Yes  [x]  No      If Yes:  Date Range for reporting period:  Initial Eval: 2023  Beginnin2023 --- Ending: 3/18/2023    Progress report will be due (10 Rx or 30 days whichever is less):      Recertification will be due (POC Duration  / 90 days whichever is less): 2023      Visit # Insurance Allowable Auth Required   In Person 2 Med Nec  []  Yes     []  No    Tele Health -  []  Yes     []  No    Total 2       Functional Test Score: LEFS :38 % (Total: 50/80)  Date assessed: 2023      Latex Allergy:  [x]NO      []YES    Preferred Language for Healthcare:   [x]English       []other:    Pain level:  0/10     SUBJECTIVE:  Patient reports     OBJECTIVE: See eval  Observation:   Test measurements:      RESTRICTIONS/PRECAUTIONS: R GENIE Anterior     Exercises/Interventions:   Therapeutic Ex (05270)  Therapeutic Activity (62858)  NMR re-education (57306) Sets/Reps Notes/CUES   Bike          Glut Sets 10 x 10\"    Quad Sets 10 x 10\"    Hamstring Sets  10 x 10\"         PPT 10 x 10\"         Heel Slides 2 x 10    SAQ 2 x 10         HL Hip ADD Squeeze  20 x 5\"         LAQ 20 x     Seated HS Stretch  3 x 30\" Standing HR 20 x     Standing HSC  20 x ea         Slant Board 3 x 30\"    Standing HR  20 x     Standing Hip Abd/Ext 20 x     Standing HSC/Marches 20 x                              Manual Intervention (17606)     Right Hip Circumduction 5'    DTM to thigh  5'    Right Hip AROM 5'                        [employer] access code:   Access Code: [email or web address]  URL: [email or web address]  Date: 02/16/2023  Prepared by: Clyde Ho    Exercises  Supine Gluteal Sets - 1 x daily - 7 x weekly - 3 sets - 10 reps  Supine Quad Set - 1 x daily - 7 x weekly - 3 sets - 10 reps  Supine Posterior Pelvic Tilt - 1 x daily - 7 x weekly - 3 sets - 10 reps  Supine Knee Extension Strengthening - 1 x daily - 7 x weekly - 3 sets - 10 reps  Supine Hip Adduction Isometric with Ball - 1 x daily - 7 x weekly - 3 sets - 10 reps  Supine Heel Slide - 1 x daily - 7 x weekly - 3 sets - 10 reps  Seated Long Arc Quad - 1 x daily - 7 x weekly - 3 sets - 10 reps  Standing Heel Raise with Support - 1 x daily - 7 x weekly - 3 sets - 10 reps  Standing Alternating Knee Flexion - 1 x daily - 7 x weekly - 3 sets - 10 reps               Patient Education 10' Pt education with HEP and progression of PT along with compliance with HEP to aide with formal PT for optimal outcomes. Therapeutic Exercise and NMR EXR  [x] (68914) Provided verbal/tactile cueing for activities related to strengthening, flexibility, endurance, ROM for improvements in LE, proximal hip, and core control with self care, mobility, lifting, ambulation. [x] (66476) Provided verbal/tactile cueing for activities related to improving balance, coordination, kinesthetic sense, posture, motor skill, proprioception to assist with LE, proximal hip, and core control in self-care, mobility, lifting, ambulation and eccentric single leg control.      NMR and Therapeutic Activities:    [x] (81181 or 64072) Provided verbal/tactile cueing for activities related to improving balance, coordination, kinesthetic sense, posture, motor skill, proprioception and motor activation to allow for proper function of core, proximal hip and LE with self-care and ADLs and functional mobility. [x] (36308) Gait Re-education- Provided training and instruction to the patient for proper LE, core and proximal hip recruitment and positioning and eccentric body weight control with ambulation re-education including up and down stairs     Home Exercise Program:    [x] (16770) Reviewed/Progressed HEP activities related to strengthening, flexibility, endurance, ROM of core, proximal hip and LE for functional self-care, mobility, lifting and ambulation/stair navigation   [x] (84820) Reviewed/Progressed HEP activities related to improving balance, coordination, kinesthetic sense, posture, motor skill, proprioception of core, proximal hip and LE for self-care, mobility, lifting, and ambulation/stair navigation      Manual Treatments:  PROM / STM / Oscillations-Mobs:  G-I, II, III, IV (PA's, Inf., Post.)  [x] (87360) Provided manual therapy to mobilize LE, proximal hip and/or LS spine soft tissue/joints for the purpose of modulating pain, promoting relaxation, increasing ROM, reducing/eliminating soft tissue swelling/inflammation/restriction, improving soft tissue extensibility and allowing for proper ROM for normal function with self-care, mobility, lifting and ambulation. Modalities:     [] GAME READY (VASO)- for significant edema, swelling, pain control.      Charges:  Timed Code Treatment Minutes: 40'   Total Treatment Minutes:  60'   BWC:  TE TIME:  NMR TIME:  MANUAL TIME:  UNTIMED MINUTES:  Medicare Total:    -  -  -  -  Visits: 2  Total: $230      [] EVAL (LOW) 51265 (typically 20 minutes face-to-face)  [] EVAL (MOD) 14785 (typically 30 minutes face-to-face)  [] EVAL (HIGH) 95379 (typically 45 minutes face-to-face)  [] RE-EVAL     [x] UG(97824) x   2  [] IONTO  [] NMR (81554) x     [] VASO  [x] Manual (79948) x  1 [] Other:  [] TA x      [] [facility] Traction (31709)  [] ES(attended) (25656)      [] ES (un) (35548):    ASSESSMENT SUMMARY:  Patient is a 75 y.o. male reporting to OP PT with c/c of decreased strength and endurance, difficulty standing for long duration, and difficulty finding comfortable position to sleep which has been occurring over the past year  Patient underwent Anterior GENIE on 1/17/2023. Pt is noted to have decreased strength, ROM, and overall decrease of functional mobility. Patient received education on their current pathology and how their condition effects them with their functional activities. Patient understood discussion and questions were answered. Patient understands their activity limitations and understands rational for treatment progression. Pt educated on plan of care and HEP, if worsening symptoms to d/c that exercise. 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 Independent in HEP and progression per patient tolerance, in order to prevent re-injury. [] Progressing: [] Met: [] Not Met: [] Adjusted   2. Patient will have a decrease in pain of NRPS to facilitate improvement in movement, function, and ADLs as indicated by Functional Deficits. [] Progressing: [] Met: [] Not Met: [] Adjusted      Long Term Goals: To be achieved in: 12 weeks  Functional Scale Test LEFS score will be </= 15% to assist with reaching prior level of function. [] Progressing: [] Met: [] Not Met: [] Adjusted   2. Patient will demonstrate increased AROM of R Hip to [facility] to allow for proper joint functioning as indicated by patients Functional Deficits. [] Progressing: [] Met: [] Not Met: [] Adjusted   3. Patient will demonstrate an increase in Strength of R Hip to 4+/5 to be able to ascend/descend stairs allow for proper functional mobility as indicated by patients Functional Deficits. [] Progressing: [] Met: [] Not Met: [] Adjusted   4. Patient will return to functional ascending and descending 8\" step activities with NRPS of </= 1-2/10. [] Progressing: [] Met: [] Not Met: [] Adjusted   5. Patient will tolerate standing position for >/=  (patient specific functional goal)    [] Progressing: [] Met: [] Not Met: [] Adjusted                        Overall Progression Towards Functional goals/ Treatment Progress Update:  [] Patient is progressing as expected towards functional goals listed. [] Progression is slowed due to complexities/Impairments listed. [] Progression has been slowed due to co-morbidities. [x] Plan just implemented, too soon to assess goals progression <30days   [] Goals require adjustment due to lack of progress  [] Patient is not progressing as expected and requires additional follow up with physician  [] Other    Prognosis for POC: [x] Good [] Fair  [] Poor    Patient requires continued skilled intervention: [x] Yes  [] No    Treatment/Activity Tolerance:  [x] Patient able to complete treatment  [] Patient limited by fatigue  [] Patient limited by pain    [] Patient limited by other medical complications  [] Other:     Return to Play: (if applicable)   []  Stage 1: Intro to Strength   []  Stage 2: Return to Run and Strength   []  Stage 3: Return to Jump and Strength   []  Stage 4: Dynamic Strength and Agility   []  Stage 5: Sport Specific Training     []  Ready to Return to Play, Meets All Above Stages   []  Not Ready for Return to Sports   Comments:                         PLAN: See eval  [x] Continue per plan of care [] Alter current plan (see comments above)  [] Plan of care initiated [] Hold pending MD visit [] Discharge    Electronically signed by:  Dru Torres, PT, MPT,ATC, cert DN     Note: If patient does not return for scheduled/ recommended follow up visits, this note will serve as a discharge from care along with most recent update on progress.

## 2023-03-01 ENCOUNTER — HOSPITAL ENCOUNTER (OUTPATIENT)
Dept: PHYSICAL THERAPY | Age: 68
Setting detail: THERAPIES SERIES
Discharge: HOME | End: 2023-03-01
Payer: MEDICARE

## 2023-03-01 PROCEDURE — 97112 NEUROMUSCULAR REEDUCATION: CPT

## 2023-03-01 PROCEDURE — 97110 THERAPEUTIC EXERCISES: CPT

## 2023-03-01 PROCEDURE — 97140 MANUAL THERAPY 1/> REGIONS: CPT

## 2023-03-01 RX ORDER — ENALAPRIL MALEATE 20 MG/1
TABLET ORAL
Qty: 180 TABLET | Refills: 3 | Status: SHIPPED | OUTPATIENT
Start: 2023-03-01

## 2023-03-01 NOTE — TELEPHONE ENCOUNTER
Future Appointments   Date Time Provider Aislinn Dulce   3/8/2023  9:15 AM Carnella Settler, PT [facility] PT [facility]   3/14/2023  9:15 AM Carnella Settler, PT [facility] PT [facility]   3/16/2023  9:15 AM Carnella Settler, PT [facility] PT [facility]   4/3/2023  8:20 AM [facility]   4/18/2023  8:20 AM Yanet Harris MD [place]  [place] - [facility]D   4/25/2023 10:00 AM Kaycee Moreira MD AND [facility]

## 2023-03-01 NOTE — FLOWSHEET NOTE
[facility], [address] [place] [address] [place], [address]  Phone: [phone], [phone]    Physical Therapy Treatment Note/ Progress Report:     Date:  3/1/2023    Patient Name:  Roma Lentz    :  1955  MRN: [id number]  Restrictions/Precautions:    Medical/Treatment Diagnosis Information:  Diagnosis: R Hip Avascular Necrosis (M87.051)  and OA (M16.11) s/p GENIE (U43.005)   Treatment Diagnosis: Abnormalities of Gait (R26.89)               Insurance/Certification information:  PT Insurance Information: Medicare/[employer] Medicare Supplement  Physician Information:  Referring Provider (secondary):  Cuong Wick  Has the plan of care been signed (Y/N):        []  Yes  [x]  No     Date of Patient follow up with Physician:     Is this a Progress Report:     []  Yes  [x]  No      If Yes:  Date Range for reporting period:  Initial Eval: 2023  Beginnin2023 --- Ending: 3/18/2023    Progress report will be due (10 Rx or 30 days whichever is less):      Recertification will be due (POC Duration  / 90 days whichever is less): 2023      Visit # Insurance Allowable Auth Required   In Person 3 Med Nec  []  Yes     []  No    Tele Health -  []  Yes     []  No    Total 3       Functional Test Score: LEFS :38 % (Total: 50/80)  Date assessed: 2023      Latex Allergy:  [x]NO      []YES    Preferred Language for Healthcare:   [x]English       []other:    Pain level:  0/10     SUBJECTIVE:  Patient reports feeling alright, states compliant with HEP     OBJECTIVE: See eval  Observation:   Test measurements:      RESTRICTIONS/PRECAUTIONS: R GENIE Anterior     Exercises/Interventions:   Therapeutic Ex (49729)  Therapeutic Activity (61312)  NMR re-education (21017) Sets/Reps Notes/CUES   Bike          Glut Sets 10 x 10\"    Quad Sets 10 x 10\"    Hamstring Sets  10 x 10\"         PPT 10 x 10\"         Heel Slides 2 x 10    SAQ 2 x 10         HL Hip ADD Squeeze  20 x 5\" LAQ 20 x     Seated HS Stretch  3 x 30\"         Standing HR 20 x     Standing HSC  20 x ea         Slant Board 3 x 30\"    Standing HR  20 x     Standing Hip Abd/Ext 20 x     Standing HSC/Marches 20 x     SLS 5 x 10\" ea         FSU  20 x  4\"             Manual Intervention (10726)     Right Hip Circumduction 5'    DTM to thigh  5'    Right Hip AROM 5'                        Medbridge access code:   Access Code: [email or web address]  URL: [email or web address]  Date: 02/16/2023  Prepared by: Chung Alpha    Exercises  Supine Gluteal Sets - 1 x daily - 7 x weekly - 3 sets - 10 reps  Supine Quad Set - 1 x daily - 7 x weekly - 3 sets - 10 reps  Supine Posterior Pelvic Tilt - 1 x daily - 7 x weekly - 3 sets - 10 reps  Supine Knee Extension Strengthening - 1 x daily - 7 x weekly - 3 sets - 10 reps  Supine Hip Adduction Isometric with Ball - 1 x daily - 7 x weekly - 3 sets - 10 reps  Supine Heel Slide - 1 x daily - 7 x weekly - 3 sets - 10 reps  Seated Long Arc Quad - 1 x daily - 7 x weekly - 3 sets - 10 reps  Standing Heel Raise with Support - 1 x daily - 7 x weekly - 3 sets - 10 reps  Standing Alternating Knee Flexion - 1 x daily - 7 x weekly - 3 sets - 10 reps               Patient Education 10' Pt education with HEP and progression of PT along with compliance with HEP to aide with formal PT for optimal outcomes. Therapeutic Exercise and NMR EXR  [x] (83759) Provided verbal/tactile cueing for activities related to strengthening, flexibility, endurance, ROM for improvements in LE, proximal hip, and core control with self care, mobility, lifting, ambulation. [x] (20210) Provided verbal/tactile cueing for activities related to improving balance, coordination, kinesthetic sense, posture, motor skill, proprioception to assist with LE, proximal hip, and core control in self-care, mobility, lifting, ambulation and eccentric single leg control.      NMR and Therapeutic Activities:    [x] (85767 or 33528) Provided verbal/tactile cueing for activities related to improving balance, coordination, kinesthetic sense, posture, motor skill, proprioception and motor activation to allow for proper function of core, proximal hip and LE with self-care and ADLs and functional mobility. [x] (71317) Gait Re-education- Provided training and instruction to the patient for proper LE, core and proximal hip recruitment and positioning and eccentric body weight control with ambulation re-education including up and down stairs     Home Exercise Program:    [x] (33022) Reviewed/Progressed HEP activities related to strengthening, flexibility, endurance, ROM of core, proximal hip and LE for functional self-care, mobility, lifting and ambulation/stair navigation   [x] (82210) Reviewed/Progressed HEP activities related to improving balance, coordination, kinesthetic sense, posture, motor skill, proprioception of core, proximal hip and LE for self-care, mobility, lifting, and ambulation/stair navigation      Manual Treatments:  PROM / STM / Oscillations-Mobs:  G-I, II, III, IV (PA's, Inf., Post.)  [x] (21872) Provided manual therapy to mobilize LE, proximal hip and/or LS spine soft tissue/joints for the purpose of modulating pain, promoting relaxation, increasing ROM, reducing/eliminating soft tissue swelling/inflammation/restriction, improving soft tissue extensibility and allowing for proper ROM for normal function with self-care, mobility, lifting and ambulation. Modalities:     [] GAME READY (VASO)- for significant edema, swelling, pain control.      Charges:  Timed Code Treatment Minutes: 61'   Total Treatment Minutes:  60'   BWC:  TE TIME:  NMR TIME:  MANUAL TIME:  UNTIMED MINUTES:  Medicare Total:    -  -  -  -  Visits: 3  Total: $360      [] EVAL (LOW) 34238 (typically 20 minutes face-to-face)  [] EVAL (MOD) 85509 (typically 30 minutes face-to-face)  [] EVAL (HIGH) 34093 (typically 45 minutes face-to-face)  [] RE-EVAL     [x] (74107) x 2  [] IONTO  [x] NMR (08483) x  1   [] VASO  [x] Manual (43623) x  1   [] Other:  [] TA x      [] Mech Traction (71531)  [] ES(attended) (34030)      [] ES (un) (08461):    ASSESSMENT SUMMARY:  Patient is a 75 y.o. male reporting to OP PT with c/c of decreased strength and endurance, difficulty standing for long duration, and difficulty finding comfortable position to sleep which has been occurring over the past year  Patient underwent Anterior GENIE on 1/17/2023. Pt is noted to have decreased strength, ROM, and overall decrease of functional mobility. Patient received education on their current pathology and how their condition effects them with their functional activities. Patient understood discussion and questions were answered. Patient understands their activity limitations and understands rational for treatment progression. Pt educated on plan of care and HEP, if worsening symptoms to d/c that exercise. 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 Independent in HEP and progression per patient tolerance, in order to prevent re-injury. [] Progressing: [] Met: [] Not Met: [] Adjusted   2. Patient will have a decrease in pain of NRPS to facilitate improvement in movement, function, and ADLs as indicated by Functional Deficits. [] Progressing: [] Met: [] Not Met: [] Adjusted      Long Term Goals: To be achieved in: 12 weeks  Functional Scale Test LEFS score will be </= 15% to assist with reaching prior level of function. [] Progressing: [] Met: [] Not Met: [] Adjusted   2. Patient will demonstrate increased AROM of R Hip to [facility] to allow for proper joint functioning as indicated by patients Functional Deficits. [] Progressing: [] Met: [] Not Met: [] Adjusted   3.  Patient will demonstrate an increase in Strength of R Hip to 4+/5 to be able to ascend/descend stairs allow for proper functional mobility as indicated by patients Functional Deficits. [] Progressing: [] Met: [] Not Met: [] Adjusted   4. Patient will return to functional ascending and descending 8\" step activities with NRPS of </= 1-2/10. [] Progressing: [] Met: [] Not Met: [] Adjusted   5. Patient will tolerate standing position for >/=  (patient specific functional goal)    [] Progressing: [] Met: [] Not Met: [] Adjusted                        Overall Progression Towards Functional goals/ Treatment Progress Update:  [] Patient is progressing as expected towards functional goals listed. [] Progression is slowed due to complexities/Impairments listed. [] Progression has been slowed due to co-morbidities.   [x] Plan just implemented, too soon to assess goals progression <30days   [] Goals require adjustment due to lack of progress  [] Patient is not progressing as expected and requires additional follow up with physician  [] Other    Prognosis for POC: [x] Good [] Fair  [] Poor    Patient requires continued skilled intervention: [x] Yes  [] No    Treatment/Activity Tolerance:  [x] Patient able to complete treatment  [] Patient limited by fatigue  [] Patient limited by pain    [] Patient limited by other medical complications  [] Other:     Return to Play: (if applicable)   []  Stage 1: Intro to Strength   []  Stage 2: Return to Run and Strength   []  Stage 3: Return to Jump and Strength   []  Stage 4: Dynamic Strength and Agility   []  Stage 5: Sport Specific Training     []  Ready to Return to Play, Meets All Above Stages   []  Not Ready for Return to Sports   Comments:                         PLAN: See eval  [x] Continue per plan of care [] Alter current plan (see comments above)  [] Plan of care initiated [] Hold pending MD visit [] Discharge    Electronically signed by:  Francheska Martinez, PT, MPT,ATC, cert DN     Note: If patient does not return for scheduled/ recommended follow up visits, this note will serve as a discharge from care along with most recent update on progress.

## 2023-03-08 ENCOUNTER — HOSPITAL ENCOUNTER (OUTPATIENT)
Dept: PHYSICAL THERAPY | Age: 68
Setting detail: THERAPIES SERIES
Discharge: HOME | End: 2023-03-08
Payer: MEDICARE

## 2023-03-08 PROCEDURE — 97112 NEUROMUSCULAR REEDUCATION: CPT

## 2023-03-08 PROCEDURE — 97140 MANUAL THERAPY 1/> REGIONS: CPT

## 2023-03-08 PROCEDURE — 97110 THERAPEUTIC EXERCISES: CPT

## 2023-03-08 NOTE — FLOWSHEET NOTE
Former[facility],  [facility] [address] [place], [address]  Phone: [phone], [phone]    Physical Therapy Treatment Note/ Progress Report:     Date:  3/8/2023    Patient Name:  Dennise Marin    :  1955  MRN: [id number]  Restrictions/Precautions:    Medical/Treatment Diagnosis Information:  Diagnosis: R Hip Avascular Necrosis (M87.051)  and OA (M16.11) s/p GENIE (D40.693)   Treatment Diagnosis: Abnormalities of Gait (R26.89)               Insurance/Certification information:  PT Insurance Information: Medicare/[employer] Medicare Supplement  Physician Information:  Referring Provider (secondary):  Sam Marcum  Has the plan of care been signed (Y/N):        []  Yes  [x]  No     Date of Patient follow up with Physician:     Is this a Progress Report:     []  Yes  [x]  No      If Yes:  Date Range for reporting period:  Initial Eval: 2023  Beginnin2023 --- Ending: 3/18/2023    Progress report will be due (10 Rx or 30 days whichever is less): 3/56/3949     Recertification will be due (POC Duration  / 90 days whichever is less): 2023      Visit # Insurance Allowable Auth Required   In Person [facility]  []  Yes     []  No    Tele Health -  []  Yes     []  No    Total 4       Functional Test Score: LEFS :38 % (Total: 50/80)  Date assessed: 2023      Latex Allergy:  [x]NO      []YES    Preferred Language for Healthcare:   [x]English       []other:    Pain level:  0/10     SUBJECTIVE:  (Pt is 7 wks PO 3/7/2023) Pt reports no new complaints     OBJECTIVE: See eval  Observation:   Test measurements:      RESTRICTIONS/PRECAUTIONS: R GENIE Anterior     Exercises/Interventions:   Therapeutic Ex (26320)  Therapeutic Activity (35437)  NMR re-education () Sets/Reps Notes/CUES   Bike          Glut Sets 10 x 10\"    Quad Sets 10 x 10\"    Hamstring Sets  10 x 10\"         PPT 10 x 10\"         Heel Slides 2 x 10    SAQ 2 x 10    Alt March 20 x     HL Hip ADD Squeeze  20 x 5\"    HL Hip Abd 3 Way 20 x  Green TBand        LAQ 20 x     Seated HS Stretch  3 x 30\"         Standing HR 20 x     Standing HSC  20 x ea         Slant Board 3 x 30\"    Standing HR  20 x     Standing Hip Abd/Ext 20 x     Standing HSC/Marches 20 x     SLS 5 x 10\" ea    Partial Squat 2 x 10         FSU  20 x  6\"             Manual Intervention (89420)     Right Hip Circumduction 5'    DTM to thigh  5'    Right Hip AROM 5'                        Prom[employer] access code:   Access Code: [email or web address]  URL: [email or web address]  Date: 02/16/2023  Prepared by: Yasmin Yee    Exercises  Supine Gluteal Sets - 1 x daily - 7 x weekly - 3 sets - 10 reps  Supine Quad Set - 1 x daily - 7 x weekly - 3 sets - 10 reps  Supine Posterior Pelvic Tilt - 1 x daily - 7 x weekly - 3 sets - 10 reps  Supine Knee Extension Strengthening - 1 x daily - 7 x weekly - 3 sets - 10 reps  Supine Hip Adduction Isometric with Ball - 1 x daily - 7 x weekly - 3 sets - 10 reps  Supine Heel Slide - 1 x daily - 7 x weekly - 3 sets - 10 reps  Seated Long Arc Quad - 1 x daily - 7 x weekly - 3 sets - 10 reps  Standing Heel Raise with Support - 1 x daily - 7 x weekly - 3 sets - 10 reps  Standing Alternating Knee Flexion - 1 x daily - 7 x weekly - 3 sets - 10 reps               Patient Education 10' Pt education with HEP and progression of PT along with compliance with HEP to aide with formal PT for optimal outcomes.        Therapeutic Exercise and NMR EXR  [x] (29813) Provided verbal/tactile cueing for activities related to strengthening, flexibility, endurance, ROM for improvements in LE, proximal hip, and core control with self care, mobility, lifting, ambulation.  [x] (56902) Provided verbal/tactile cueing for activities related to improving balance, coordination, kinesthetic sense, posture, motor skill, proprioception to assist with LE, proximal hip, and core control in self-care, mobility, lifting, ambulation and eccentric single leg control.     NMR  and Therapeutic Activities:    [x] (01528 or 10026) Provided verbal/tactile cueing for activities related to improving balance, coordination, kinesthetic sense, posture, motor skill, proprioception and motor activation to allow for proper function of core, proximal hip and LE with self-care and ADLs and functional mobility. [x] (71059) Gait Re-education- Provided training and instruction to the patient for proper LE, core and proximal hip recruitment and positioning and eccentric body weight control with ambulation re-education including up and down stairs     Home Exercise Program:    [x] (15140) Reviewed/Progressed HEP activities related to strengthening, flexibility, endurance, ROM of core, proximal hip and LE for functional self-care, mobility, lifting and ambulation/stair navigation   [x] (48359) Reviewed/Progressed HEP activities related to improving balance, coordination, kinesthetic sense, posture, motor skill, proprioception of core, proximal hip and LE for self-care, mobility, lifting, and ambulation/stair navigation      Manual Treatments:  PROM / STM / Oscillations-Mobs:  G-I, II, III, IV (PA's, Inf., Post.)  [x] (55445) Provided manual therapy to mobilize LE, proximal hip and/or LS spine soft tissue/joints for the purpose of modulating pain, promoting relaxation, increasing ROM, reducing/eliminating soft tissue swelling/inflammation/restriction, improving soft tissue extensibility and allowing for proper ROM for normal function with self-care, mobility, lifting and ambulation. Modalities:     [] GAME READY (VASO)- for significant edema, swelling, pain control.      Charges:  Timed Code Treatment Minutes: 54'   Total Treatment Minutes:  72'   BWC:  TE TIME:  NMR TIME:  MANUAL TIME:  UNTIMED MINUTES:  Medicare Total:    -  -  -  -  Visits: 4  Total: $490      [] EVAL (LOW) 48889 (typically 20 minutes face-to-face)  [] EVAL (MOD) 34734 (typically 30 minutes face-to-face)  [] EVAL (HIGH) 58814 (typically 45 minutes face-to-face)  [] RE-EVAL     [x] GD(71335) x   2  [] IONTO  [x] NMR (86351) x  1   [] VASO  [x] Manual (14364) x  1   [] Other:  [] TA x      [] Mech Traction (27936)  [] ES(attended) (37541)      [] ES (un) (13792):    ASSESSMENT SUMMARY:  Patient is a 75 y.o. male reporting to OP PT with c/c of decreased strength and endurance, difficulty standing for long duration, and difficulty finding comfortable position to sleep which has been occurring over the past year  Patient underwent Anterior GENIE on 1/17/2023. Pt is noted to have decreased strength, ROM, and overall decrease of functional mobility. Patient received education on their current pathology and how their condition effects them with their functional activities. Patient understood discussion and questions were answered. Patient understands their activity limitations and understands rational for treatment progression. Pt educated on plan of care and HEP, if worsening symptoms to d/c that exercise. 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 Independent in HEP and progression per patient tolerance, in order to prevent re-injury. [] Progressing: [] Met: [] Not Met: [] Adjusted   2. Patient will have a decrease in pain of NRPS to facilitate improvement in movement, function, and ADLs as indicated by Functional Deficits. [] Progressing: [] Met: [] Not Met: [] Adjusted      Long Term Goals: To be achieved in: 12 weeks  Functional Scale Test LEFS score will be </= 15% to assist with reaching prior level of function. [] Progressing: [] Met: [] Not Met: [] Adjusted   2. Patient will demonstrate increased AROM of R Hip to [facility] to allow for proper joint functioning as indicated by patients Functional Deficits. [] Progressing: [] Met: [] Not Met: [] Adjusted   3.  Patient will demonstrate an increase in Strength of R Hip to 4+/5 to be able to ascend/descend stairs allow for proper functional mobility as indicated by patients Functional Deficits. [] Progressing: [] Met: [] Not Met: [] Adjusted   4. Patient will return to functional ascending and descending 8\" step activities with NRPS of </= 1-2/10. [] Progressing: [] Met: [] Not Met: [] Adjusted   5. Patient will tolerate standing position for >/=  (patient specific functional goal)    [] Progressing: [] Met: [] Not Met: [] Adjusted                        Overall Progression Towards Functional goals/ Treatment Progress Update:  [] Patient is progressing as expected towards functional goals listed. [] Progression is slowed due to complexities/Impairments listed. [] Progression has been slowed due to co-morbidities.   [x] Plan just implemented, too soon to assess goals progression <30days   [] Goals require adjustment due to lack of progress  [] Patient is not progressing as expected and requires additional follow up with physician  [] Other    Prognosis for POC: [x] Good [] Fair  [] Poor    Patient requires continued skilled intervention: [x] Yes  [] No    Treatment/Activity Tolerance:  [x] Patient able to complete treatment  [] Patient limited by fatigue  [] Patient limited by pain    [] Patient limited by other medical complications  [] Other:     Return to Play: (if applicable)   []  Stage 1: Intro to Strength   []  Stage 2: Return to Run and Strength   []  Stage 3: Return to Jump and Strength   []  Stage 4: Dynamic Strength and Agility   []  Stage 5: Sport Specific Training     []  Ready to Return to Play, Meets All Above Stages   []  Not Ready for Return to Sports   Comments:                         PLAN: See eval  [x] Continue per plan of care [] Alter current plan (see comments above)  [] Plan of care initiated [] Hold pending MD visit [] Discharge    Electronically signed by:  Milla Isaac, PT, MPT,ATC, cert DN     Note: If patient does not return for scheduled/ recommended follow up visits, this note will serve as a discharge from care along with most recent update on progress.

## 2023-03-13 RX ORDER — OMEPRAZOLE 40 MG/1
40 CAPSULE, DELAYED RELEASE ORAL
Qty: 90 CAPSULE | Refills: 0 | Status: SHIPPED | OUTPATIENT
Start: 2023-03-13

## 2023-03-13 RX ORDER — VITAMIN B COMPLEX
TABLET ORAL
Qty: 200 TABLET | Refills: 0 | Status: SHIPPED | OUTPATIENT
Start: 2023-03-13

## 2023-03-13 NOTE — TELEPHONE ENCOUNTER
Renato Joy is requesting refill(s)   Last OV 1/5/23 (pertaining to medication)  LR 11/27/22 (per medication requested)  Next office visit scheduled or attempted Yes   If no, reason:  4/18/23

## 2023-03-14 ENCOUNTER — HOSPITAL ENCOUNTER (OUTPATIENT)
Dept: PHYSICAL THERAPY | Age: 68
Setting detail: THERAPIES SERIES
Discharge: HOME | End: 2023-03-14
Payer: MEDICARE

## 2023-03-14 PROCEDURE — 97140 MANUAL THERAPY 1/> REGIONS: CPT

## 2023-03-14 PROCEDURE — 97110 THERAPEUTIC EXERCISES: CPT

## 2023-03-14 PROCEDURE — 97112 NEUROMUSCULAR REEDUCATION: CPT

## 2023-03-14 NOTE — FLOWSHEET NOTE
[facility],  [facility] [address], [address]  Phone: [phone], [phone]    Physical Therapy Treatment Note/ Progress Report:     Date:  3/14/2023    Patient Name:  Fiorella aM    :  1955  MRN: [id number]  Restrictions/Precautions:    Medical/Treatment Diagnosis Information:  Diagnosis: R Hip Avascular Necrosis (M87.051)  and OA (M16.11) s/p GENIE (L67.413)   Treatment Diagnosis: Abnormalities of Gait (R26.89)               Insurance/Certification information:  PT Insurance Information: Medicare/[employer] Medicare Supplement  Physician Information:  Referring Provider (secondary):  Christopher Veras  Has the plan of care been signed (Y/N):        []  Yes  [x]  No     Date of Patient follow up with Physician:     Is this a Progress Report:     []  Yes  [x]  No      If Yes:  Date Range for reporting period:  Initial Eval: 2023  Beginnin2023 --- Ending: 3/18/2023    Progress report will be due (10 Rx or 30 days whichever is less): 3/86/6649     Recertification will be due (POC Duration  / 90 days whichever is less): 2023      Visit # Insurance Allowable Auth Required   In Person [address] [place] [address]  []  Yes     []  No    Tele Health -  []  Yes     []  No    Total 5       Functional Test Score: LEFS :38 % (Total: 50/80)  Date assessed: 2023      Latex Allergy:  [x]NO      []YES    Preferred Language for Healthcare:   [x]English       []other:    Pain level:  0/10     SUBJECTIVE:  (Pt is 8 wks PO 3/14/2023) Pt reports no new complaints , states feeling like he is doing very well     OBJECTIVE: See eval  Observation:   Test measurements:      RESTRICTIONS/PRECAUTIONS: R GENIE Anterior     Exercises/Interventions:   Therapeutic Ex (66091)  Therapeutic Activity (02183)  NMR re-education (01107) Sets/Reps Notes/CUES   Bike          Glut Sets 10 x 10\"    Quad Sets 10 x 10\"    Hamstring Sets  10 x 10\"         PPT 10 x 10\"         Heel Slides 2 x 10 1#   SAQ 2 x 10 Alt March 20 x     HL Hip ADD Squeeze  20 x 5\"    HL Hip Abd 3 Way 20 x  Green TBand        LAQ 20 x  1#   Seated HS Stretch  3 x 30\"              Slant Board 3 x 30\"    Standing HR  20 x     Standing Hip Abd/Ext 20 x  1#   Standing HSC/Marches 20 x  1#   SLS 10 x 10\" ea    Partial Squat 2 x 10         FSU  20 x R, L 1# 6\"   Lateral Step Up  20 x R,L 1# 6\"   Step Up and over 10 x ea 1# 4\"                  Manual Intervention (68493)     Right Hip Circumduction 5'    DTM to thigh  5'    Right Hip AROM 5'                        Medbridge access code:   Access Code: [email or web address]  URL: [email or web address]  Date: 02/16/2023  Prepared by: Jose Wick    Exercises  Supine Gluteal Sets - 1 x daily - 7 x weekly - 3 sets - 10 reps  Supine Quad Set - 1 x daily - 7 x weekly - 3 sets - 10 reps  Supine Posterior Pelvic Tilt - 1 x daily - 7 x weekly - 3 sets - 10 reps  Supine Knee Extension Strengthening - 1 x daily - 7 x weekly - 3 sets - 10 reps  Supine Hip Adduction Isometric with Ball - 1 x daily - 7 x weekly - 3 sets - 10 reps  Supine Heel Slide - 1 x daily - 7 x weekly - 3 sets - 10 reps  Seated Long Arc Quad - 1 x daily - 7 x weekly - 3 sets - 10 reps  Standing Heel Raise with Support - 1 x daily - 7 x weekly - 3 sets - 10 reps  Standing Alternating Knee Flexion - 1 x daily - 7 x weekly - 3 sets - 10 reps               Patient Education 10' Pt education with HEP and progression of PT along with compliance with HEP to aide with formal PT for optimal outcomes. Therapeutic Exercise and NMR EXR  [x] (72233) Provided verbal/tactile cueing for activities related to strengthening, flexibility, endurance, ROM for improvements in LE, proximal hip, and core control with self care, mobility, lifting, ambulation.   [x] (51256) Provided verbal/tactile cueing for activities related to improving balance, coordination, kinesthetic sense, posture, motor skill, proprioception to assist with LE, proximal hip, and core control in self-care, mobility, lifting, ambulation and eccentric single leg control.     NMR and Therapeutic Activities:    [x] (60288 or 57408) Provided verbal/tactile cueing for activities related to improving balance, coordination, kinesthetic sense, posture, motor skill, proprioception and motor activation to allow for proper function of core, proximal hip and LE with self-care and ADLs and functional mobility.   [x] (19038) Gait Re-education- Provided training and instruction to the patient for proper LE, core and proximal hip recruitment and positioning and eccentric body weight control with ambulation re-education including up and down stairs     Home Exercise Program:    [x] (06546) Reviewed/Progressed HEP activities related to strengthening, flexibility, endurance, ROM of core, proximal hip and LE for functional self-care, mobility, lifting and ambulation/stair navigation   [x] (16415) Reviewed/Progressed HEP activities related to improving balance, coordination, kinesthetic sense, posture, motor skill, proprioception of core, proximal hip and LE for self-care, mobility, lifting, and ambulation/stair navigation      Manual Treatments:  PROM / STM / Oscillations-Mobs:  G-I, II, III, IV (PA's, Inf., Post.)  [x] (06222) Provided manual therapy to mobilize LE, proximal hip and/or LS spine soft tissue/joints for the purpose of modulating pain, promoting relaxation, increasing ROM, reducing/eliminating soft tissue swelling/inflammation/restriction, improving soft tissue extensibility and allowing for proper ROM for normal function with self-care, mobility, lifting and ambulation.     Modalities:     [] GAME READY (VASO)- for significant edema, swelling, pain control.     Charges:  Timed Code Treatment Minutes: 70'   Total Treatment Minutes:  70'   BWC:  TE TIME:  NMR TIME:  MANUAL TIME:  UNTIMED MINUTES:  Medicare Total:    -  -  -  -  Visits: 5  Total: $620      [] EVAL (LOW) 84653 (typically 20 minutes  face-to-face)  [] EVAL (MOD) 26648 (typically 30 minutes face-to-face)  [] EVAL (HIGH) 54262 (typically 45 minutes face-to-face)  [] RE-EVAL     [x] IB(33057) x   2  [] IONTO  [x] NMR (87905) x  1   [] VASO  [x] Manual (25363) x  1   [] Other:  [] TA x      [] Mech Traction (04657)  [] ES(attended) (07159)      [] ES (un) (55760):    ASSESSMENT SUMMARY:  Patient is a 75 y.o. male reporting to OP PT with c/c of decreased strength and endurance, difficulty standing for long duration, and difficulty finding comfortable position to sleep which has been occurring over the past year  Patient underwent Anterior GENIE on 1/17/2023. Pt is noted to have decreased strength, ROM, and overall decrease of functional mobility. Patient received education on their current pathology and how their condition effects them with their functional activities. Patient understood discussion and questions were answered. Patient understands their activity limitations and understands rational for treatment progression. Pt educated on plan of care and HEP, if worsening symptoms to d/c that exercise. 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 Independent in HEP and progression per patient tolerance, in order to prevent re-injury. [] Progressing: [] Met: [] Not Met: [] Adjusted   2. Patient will have a decrease in pain of NRPS to facilitate improvement in movement, function, and ADLs as indicated by Functional Deficits. [] Progressing: [] Met: [] Not Met: [] Adjusted      Long Term Goals: To be achieved in: 12 weeks  Functional Scale Test LEFS score will be </= 15% to assist with reaching prior level of function. [] Progressing: [] Met: [] Not Met: [] Adjusted   2. Patient will demonstrate increased AROM of R Hip to [facility] to allow for proper joint functioning as indicated by patients Functional Deficits.    [] Progressing: [] Met: [] Not Met: [] Adjusted   3. Patient will demonstrate an increase in Strength of R Hip to 4+/5 to be able to ascend/descend stairs allow for proper functional mobility as indicated by patients Functional Deficits. [] Progressing: [] Met: [] Not Met: [] Adjusted   4. Patient will return to functional ascending and descending 8\" step activities with NRPS of </= 1-2/10. [] Progressing: [] Met: [] Not Met: [] Adjusted   5. Patient will tolerate standing position for >/=  (patient specific functional goal)    [] Progressing: [] Met: [] Not Met: [] Adjusted                        Overall Progression Towards Functional goals/ Treatment Progress Update:  [] Patient is progressing as expected towards functional goals listed. [] Progression is slowed due to complexities/Impairments listed. [] Progression has been slowed due to co-morbidities.   [x] Plan just implemented, too soon to assess goals progression <30days   [] Goals require adjustment due to lack of progress  [] Patient is not progressing as expected and requires additional follow up with physician  [] Other    Prognosis for POC: [x] Good [] Fair  [] Poor    Patient requires continued skilled intervention: [x] Yes  [] No    Treatment/Activity Tolerance:  [x] Patient able to complete treatment  [] Patient limited by fatigue  [] Patient limited by pain    [] Patient limited by other medical complications  [] Other:     Return to Play: (if applicable)   []  Stage 1: Intro to Strength   []  Stage 2: Return to Run and Strength   []  Stage 3: Return to Jump and Strength   []  Stage 4: Dynamic Strength and Agility   []  Stage 5: Sport Specific Training     []  Ready to Return to Play, Meets All Above Stages   []  Not Ready for Return to Sports   Comments:                         PLAN: See eval  [x] Continue per plan of care [] Alter current plan (see comments above)  [] Plan of care initiated [] Hold pending MD visit [] Discharge    Electronically signed by:  Baldemar Quijano, PT, MPT,ATC, cert DN     Note: If patient does not return for scheduled/ recommended follow up visits, this note will serve as a discharge from care along with most recent update on progress.

## 2023-03-16 ENCOUNTER — HOSPITAL ENCOUNTER (OUTPATIENT)
Dept: PHYSICAL THERAPY | Age: 68
Setting detail: THERAPIES SERIES
Discharge: HOME | End: 2023-03-16
Payer: MEDICARE

## 2023-03-16 PROCEDURE — 97140 MANUAL THERAPY 1/> REGIONS: CPT

## 2023-03-16 PROCEDURE — 97110 THERAPEUTIC EXERCISES: CPT

## 2023-03-16 PROCEDURE — 97112 NEUROMUSCULAR REEDUCATION: CPT

## 2023-03-16 NOTE — PROGRESS NOTES
[facility], [address] [place], [address]  Phone: [phone], [phone]    Date: 3/16/2023          Patient Name; :  Renato Diss; 1955   Dx/ICD Code: Diagnosis: R Hip Avascular Necrosis (M87.051)  and OA (M16.11) s/p GENIE (I88.950)   Treatment Diagnosis: Abnormalities of Gait (R26.89)                    Physician: Parminder Reese        Total PT Visits: 6     Measures Previous Current   Pain (0-10)  0/10   Disability %   LEFS :23 % (Total: 62/80)         Strength  R Hip 4/5     R Knee  4/5 to 4+/5          Specific Functional Improvements & Impressions:  Pt has been seen x 6 visits and reports feeling very well. 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 Strength has improved and patient is ambulating with good gait pattern without AD. Will continue PT at least 1 more visit and decide on D/C to HEP or continuing with POC pending how patient tolerates activity. Plan & Recommendations:  [x] Continue rehabilitation due to objective improvement and continued functional deficits with frequency and duration: 1-2 x  for 2-4 with possible D/C sooner if patient doing well with no complaints   [] Progress toward  []GAP, []Work Conditioning, []Independent HEP   [] Discharge due to   [] All goals achieved, [] Maximized \"medical necessity\" [] No subjective or objective improvements      Electronically signed by:  Ave Frankel, PT, MPT,ATC, cert DN       Therapy Plan of Care Re-Certification  This patient has been re-evaluated for physical therapy services and for therapy to continue, Medicare, Medicaid and other insurances require periodic physician review of the treatment plan.  Please review the above re-evaluation and verify that you agree with plan of care as established above by signing the attached document and return it to our office or note changes to established plan below  [] Follow treatment plan as above [] Discontinue physical therapy  [] Change plan to:                                 __________________________________________________    Physician Signature:____________________________________ Date:____________  By signing above, therapists plan is approved by physician    If you have any questions or concerns, please don't hesitate to call.   Thank you for your referral.    [facility] [address] office  ([phone])     Fax [phone]       Physical Therapy Treatment Note/ Progress Report:     Date:  3/16/2023    Patient Name:  Maritza Mendez    :  1955  MRN: [id number]  Restrictions/Precautions:    Medical/Treatment Diagnosis Information:  Diagnosis: R Hip Avascular Necrosis (M87.051)  and OA (M16.11) s/p GENIE (Z96.641)23   Treatment Diagnosis: Abnormalities of Gait (R26.89)               Insurance/Certification information:  PT Insurance Information: Medicare/[employer] Medicare Supplement  Physician Information:  Referring Provider : Yue Lang  Has the plan of care been signed (Y/N):        []  Yes  [x]  No     Date of Patient follow up with Physician:     Is this a Progress Report:     []  Yes  [x]  No      If Yes:  Date Range for reporting period:  Initial Eval: 2023  Beginnin2023 --- Ending: 3/18/2023  Beginning: 3/16/2023 --- Endin2023      Progress report will be due (10 Rx or 30 days whichever is less):      Recertification will be due (POC Duration  / 90 days whichever is less): 2023      Visit # Insurance Allowable Auth Required   In Person [address]  []  Yes     []  No    [facility] -  []  Yes     []  No    Total 6       Functional Test Score: LEFS :38 % (Total: 50/80)  Date assessed: 2023    Functional Test Score: LEFS :23 % (Total: 62/80)  Date assessed: 3/16/2023      Latex Allergy:  [x]NO      []YES    Preferred Language for Healthcare:   [x]English       []other:    Pain level:  0/10     SUBJECTIVE: (Pt is 8 wks PO 3/14/2023) Pt reports no new complaints , states feeling like he is doing very well     OBJECTIVE: See eval  Observation:   Test measurements:      RESTRICTIONS/PRECAUTIONS: R GENIE Anterior     Exercises/Interventions:   Therapeutic Ex (68654)  Therapeutic Activity (88035)  NMR re-education (66191) Sets/Reps Notes/CUES   Bike 5' L2        Glut Sets 10 x 10\"    Quad Sets 10 x 10\"    Hamstring Sets  10 x 10\"         PPT 10 x 10\"         Heel Slides 2 x 10    SAQ 2 x 10 2#   Alt March 20 x  Green TBand   HL Hip ADD Squeeze  20 x 5\"    HL Hip Abd 3 Way 20 x  Green TBand   SLR                    LAQ 30 x 5\" 2#   Seated HS Stretch  3 x 30\"              Slant Board 3 x 30\"    Standing HR  20 x  Airex   Standing Hip Abd/Ext 20 x  1#   Standing HSC/Marches 20 x  1#   SLS 10 x 10\" ea 1#   Partial Squat 2 x 10 Airex        FSU  20 x R, L 1# 6\"   Lateral Step Up  20 x R,L 1# 6\"   Step Up and over 10 x ea 1# 6\"                  Manual Intervention (48097)     Right Hip Circumduction 5'    DTM to thigh  5'    Right Hip AROM 5'                        Medbridge access code:   Access Code: [email or web address]  URL: [email or web address]  Date: 02/16/2023  Prepared by: Deepika Terrance    Exercises  Supine Gluteal Sets - 1 x daily - 7 x weekly - 3 sets - 10 reps  Supine Quad Set - 1 x daily - 7 x weekly - 3 sets - 10 reps  Supine Posterior Pelvic Tilt - 1 x daily - 7 x weekly - 3 sets - 10 reps  Supine Knee Extension Strengthening - 1 x daily - 7 x weekly - 3 sets - 10 reps  Supine Hip Adduction Isometric with Ball - 1 x daily - 7 x weekly - 3 sets - 10 reps  Supine Heel Slide - 1 x daily - 7 x weekly - 3 sets - 10 reps  Seated Long Arc Quad - 1 x daily - 7 x weekly - 3 sets - 10 reps  Standing Heel Raise with Support - 1 x daily - 7 x weekly - 3 sets - 10 reps  Standing Alternating Knee Flexion - 1 x daily - 7 x weekly - 3 sets - 10 reps               Patient Education 10' Pt education with HEP and progression of PT along with compliance with HEP to aide with formal PT for optimal outcomes. Therapeutic Exercise and NMR EXR  [x] (75857) Provided verbal/tactile cueing for activities related to strengthening, flexibility, endurance, ROM for improvements in LE, proximal hip, and core control with self care, mobility, lifting, ambulation. [x] (59850) Provided verbal/tactile cueing for activities related to improving balance, coordination, kinesthetic sense, posture, motor skill, proprioception to assist with LE, proximal hip, and core control in self-care, mobility, lifting, ambulation and eccentric single leg control. NMR and Therapeutic Activities:    [x] (08327 or 87195) Provided verbal/tactile cueing for activities related to improving balance, coordination, kinesthetic sense, posture, motor skill, proprioception and motor activation to allow for proper function of core, proximal hip and LE with self-care and ADLs and functional mobility.    [x] (10991) Gait Re-education- Provided training and instruction to the patient for proper LE, core and proximal hip recruitment and positioning and eccentric body weight control with ambulation re-education including up and down stairs     Home Exercise Program:    [x] (48546) Reviewed/Progressed HEP activities related to strengthening, flexibility, endurance, ROM of core, proximal hip and LE for functional self-care, mobility, lifting and ambulation/stair navigation   [x] (38167) Reviewed/Progressed HEP activities related to improving balance, coordination, kinesthetic sense, posture, motor skill, proprioception of core, proximal hip and LE for self-care, mobility, lifting, and ambulation/stair navigation      Manual Treatments:  PROM / STM / Oscillations-Mobs:  G-I, II, III, IV (PA's, Inf., Post.)  [x] (21934) Provided manual therapy to mobilize LE, proximal hip and/or LS spine soft tissue/joints for the purpose of modulating pain, promoting relaxation, increasing ROM, reducing/eliminating soft tissue swelling/inflammation/restriction, improving soft tissue extensibility and allowing for proper ROM for normal function with self-care, mobility, lifting and ambulation. Modalities:     [] GAME READY (VASO)- for significant edema, swelling, pain control. Charges:  Timed Code Treatment Minutes: 54'   Total Treatment Minutes:  75   BWC:  TE TIME:  NMR TIME:  MANUAL TIME:  UNTIMED MINUTES:  Medicare Total:    -  -  -  -  Visits: 6  Total: $750      [] EVAL (LOW) 14770 (typically 20 minutes face-to-face)  [] EVAL (MOD) 87422 (typically 30 minutes face-to-face)  [] EVAL (HIGH) 71534 (typically 45 minutes face-to-face)  [] RE-EVAL     [x] FX(66133) x   2  [] IONTO  [x] NMR (43800) x  1   [] VASO  [x] Manual (22755) x  1   [] Other:  [] TA x      [] Mech Traction (78885)  [] ES(attended) (16333)      [] ES (un) (59454):    ASSESSMENT SUMMARY:  Patient is a 75 y.o. male reporting to OP PT with c/c of decreased strength and endurance, difficulty standing for long duration, and difficulty finding comfortable position to sleep which has been occurring over the past year  Patient underwent Anterior GENIE on 1/17/2023. Pt is noted to have decreased strength, ROM, and overall decrease of functional mobility. Patient received education on their current pathology and how their condition effects them with their functional activities. Patient understood discussion and questions were answered. Patient understands their activity limitations and understands rational for treatment progression. Pt educated on plan of care and HEP, if worsening symptoms to d/c that exercise. 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 Independent in HEP and progression per patient tolerance, in order to prevent re-injury. [] Progressing: [] Met: [] Not Met: [] Adjusted   2.  Patient will have a decrease in pain of NRPS to facilitate improvement in movement, function, and ADLs as indicated by Functional Deficits. [] Progressing: [] Met: [] Not Met: [] Adjusted      Long Term Goals: To be achieved in: 12 weeks  Functional Scale Test LEFS score will be </= 15% to assist with reaching prior level of function. [] Progressing: [] Met: [] Not Met: [] Adjusted   2. Patient will demonstrate increased AROM of R Hip to [facility] to allow for proper joint functioning as indicated by patients Functional Deficits. [] Progressing: [] Met: [] Not Met: [] Adjusted   3. Patient will demonstrate an increase in Strength of R Hip to 4+/5 to be able to ascend/descend stairs allow for proper functional mobility as indicated by patients Functional Deficits. [] Progressing: [] Met: [] Not Met: [] Adjusted   4. Patient will return to functional ascending and descending 8\" step activities with NRPS of </= 1-2/10. [] Progressing: [] Met: [] Not Met: [] Adjusted   5. Patient will tolerate standing position for >/=  (patient specific functional goal)    [] Progressing: [] Met: [] Not Met: [] Adjusted                        Overall Progression Towards Functional goals/ Treatment Progress Update:  [] Patient is progressing as expected towards functional goals listed. [] Progression is slowed due to complexities/Impairments listed. [] Progression has been slowed due to co-morbidities.   [x] Plan just implemented, too soon to assess goals progression <30days   [] Goals require adjustment due to lack of progress  [] Patient is not progressing as expected and requires additional follow up with physician  [] Other    Prognosis for POC: [x] Good [] Fair  [] Poor    Patient requires continued skilled intervention: [x] Yes  [] No    Treatment/Activity Tolerance:  [x] Patient able to complete treatment  [] Patient limited by fatigue  [] Patient limited by pain    [] Patient limited by other medical complications  [] Other: Return to Play: (if applicable)   []  Stage 1: Intro to Strength   []  Stage 2: Return to Run and Strength   []  Stage 3: Return to Jump and Strength   []  Stage 4: Dynamic Strength and Agility   []  Stage 5: Sport Specific Training     []  Ready to Return to Play, Meets All Above Stages   []  Not Ready for Return to Sports   Comments:                         PLAN: See eval  [x] Continue per plan of care [] Alter current plan (see comments above)  [] Plan of care initiated [] Hold pending MD visit [] Discharge    Electronically signed by:  Dru Torres, PT, MPT,ATC, cert DN     Note: If patient does not return for scheduled/ recommended follow up visits, this note will serve as a discharge from care along with most recent update on progress.

## 2023-03-23 ENCOUNTER — HOSPITAL ENCOUNTER (OUTPATIENT)
Dept: PHYSICAL THERAPY | Age: 68
Setting detail: THERAPIES SERIES
Discharge: HOME | End: 2023-03-23
Payer: MEDICARE

## 2023-03-23 PROCEDURE — 97112 NEUROMUSCULAR REEDUCATION: CPT

## 2023-03-23 PROCEDURE — 97140 MANUAL THERAPY 1/> REGIONS: CPT

## 2023-03-23 PROCEDURE — 97110 THERAPEUTIC EXERCISES: CPT

## 2023-03-23 NOTE — FLOWSHEET NOTE
[facility], [address] [place] [address] [address], [address]  Phone: [phone], [phone]        Physical Therapy Treatment Note/ Progress Report:     Date:  3/23/2023    Patient Name:  Kelly Orona    :  1955  MRN: [id number]  Restrictions/Precautions:    Medical/Treatment Diagnosis Information:  Diagnosis: R Hip Avascular Necrosis (M87.051)  and OA (M16.11) s/p GENIE (Z96.641)23   Treatment Diagnosis: Abnormalities of Gait (R26.89)               Insurance/Certification information:  PT Insurance Information: Medicare/[employer] Medicare Supplement  Physician Information:  Referring Provider : Valeria Xiong  Has the plan of care been signed (Y/N):        []  Yes  [x]  No     Date of Patient follow up with Physician:     Is this a Progress Report:     []  Yes  [x]  No      If Yes:  Date Range for reporting period:  Initial Eval: 2023  Beginnin2023 --- Ending: 3/18/2023  Beginning: 3/16/2023 --- Endin2023      Progress report will be due (10 Rx or 30 days whichever is less):      Recertification will be due (POC Duration  / 90 days whichever is less): 2023      Visit # Insurance Allowable Auth Required   In Person 7 Med Nec  []  Yes     []  No    Tele Health -  []  Yes     []  No    Total 7       Functional Test Score: LEFS :38 % (Total: 50/80)  Date assessed: 2023    Functional Test Score: LEFS :23 % (Total: 62/80)  Date assessed: 3/16/2023      Latex Allergy:  [x]NO      []YES    Preferred Language for Healthcare:   [x]English       []other:    Pain level:  0/10     SUBJECTIVE:  (Pt is 9 wks PO 3/21/2023) Pt reports no new complaints , states feeling like he is doing very well     OBJECTIVE: See eval  Observation:   Test measurements:      RESTRICTIONS/PRECAUTIONS: R GENIE Anterior     Exercises/Interventions:   Therapeutic Ex (97402)  Therapeutic Activity (85225)  NMR re-education (95560) Sets/Reps Notes/CUES   Bike 5' L2

## 2023-03-24 RX ORDER — DICLOFENAC SODIUM 75 MG/1
75 TABLET, DELAYED RELEASE ORAL 2 TIMES DAILY
Qty: 60 TABLET | Refills: 0 | OUTPATIENT
Start: 2023-03-24

## 2023-03-27 ENCOUNTER — HOSPITAL ENCOUNTER (OUTPATIENT)
Dept: PHYSICAL THERAPY | Age: 68
Setting detail: THERAPIES SERIES
Discharge: HOME | End: 2023-03-27
Payer: MEDICARE

## 2023-03-27 PROCEDURE — 97112 NEUROMUSCULAR REEDUCATION: CPT

## 2023-03-27 PROCEDURE — 97140 MANUAL THERAPY 1/> REGIONS: CPT

## 2023-03-27 PROCEDURE — 97110 THERAPEUTIC EXERCISES: CPT

## 2023-03-27 NOTE — FLOWSHEET NOTE
function. [] Progressing: [] Met: [] Not Met: [] Adjusted   2. Patient will demonstrate increased AROM of R Hip to [facility] to allow for proper joint functioning as indicated by patients Functional Deficits. [] Progressing: [] Met: [] Not Met: [] Adjusted   3. Patient will demonstrate an increase in Strength of R Hip to 4+/5 to be able to ascend/descend stairs allow for proper functional mobility as indicated by patients Functional Deficits. [] Progressing: [] Met: [] Not Met: [] Adjusted   4. Patient will return to functional ascending and descending 8\" step activities with NRPS of </= 1-2/10. [] Progressing: [] Met: [] Not Met: [] Adjusted   5. Patient will tolerate standing position for >/=  (patient specific functional goal)    [] Progressing: [] Met: [] Not Met: [] Adjusted                        Overall Progression Towards Functional goals/ Treatment Progress Update:  [] Patient is progressing as expected towards functional goals listed. [] Progression is slowed due to complexities/Impairments listed. [] Progression has been slowed due to co-morbidities.   [x] Plan just implemented, too soon to assess goals progression <30days   [] Goals require adjustment due to lack of progress  [] Patient is not progressing as expected and requires additional follow up with physician  [] Other    Prognosis for POC: [x] Good [] Fair  [] Poor    Patient requires continued skilled intervention: [x] Yes  [] No    Treatment/Activity Tolerance:  [x] Patient able to complete treatment  [] Patient limited by fatigue  [] Patient limited by pain    [] Patient limited by other medical complications  [] Other:     Return to Play: (if applicable)   []  Stage 1: Intro to Strength   []  Stage 2: Return to Run and Strength   []  Stage 3: Return to Jump and Strength   []  Stage 4: Dynamic Strength and Agility   []  Stage 5: Sport Specific Training     []  Ready to Return to Play, Meets All Above Stages   []  Not Ready for Return to

## 2023-03-29 ENCOUNTER — HOSPITAL ENCOUNTER (OUTPATIENT)
Dept: PHYSICAL THERAPY | Age: 68
Setting detail: THERAPIES SERIES
Discharge: HOME | End: 2023-03-29
Payer: MEDICARE

## 2023-03-29 PROCEDURE — 97112 NEUROMUSCULAR REEDUCATION: CPT

## 2023-03-29 PROCEDURE — 97110 THERAPEUTIC EXERCISES: CPT

## 2023-03-29 PROCEDURE — 97140 MANUAL THERAPY 1/> REGIONS: CPT

## 2023-04-03 ENCOUNTER — OFFICE VISIT (OUTPATIENT)
Dept: PULMONOLOGY | Age: 68
End: 2023-04-03
Payer: MEDICARE

## 2023-04-03 ENCOUNTER — TELEPHONE (OUTPATIENT)
Dept: FAMILY MEDICINE CLINIC | Age: 68
End: 2023-04-03

## 2023-04-03 VITALS
DIASTOLIC BLOOD PRESSURE: 66 MMHG | WEIGHT: 248 LBS | HEART RATE: 85 BPM | BODY MASS INDEX: 35.5 KG/M2 | OXYGEN SATURATION: 96 % | HEIGHT: 70 IN | SYSTOLIC BLOOD PRESSURE: 112 MMHG

## 2023-04-03 DIAGNOSIS — I10 ESSENTIAL HYPERTENSION, BENIGN: ICD-10-CM

## 2023-04-03 DIAGNOSIS — R53.83 OTHER FATIGUE: ICD-10-CM

## 2023-04-03 DIAGNOSIS — G47.33 SEVERE OBSTRUCTIVE SLEEP APNEA: Primary | ICD-10-CM

## 2023-04-03 DIAGNOSIS — E66.9 OBESITY (BMI 30-39.9): ICD-10-CM

## 2023-04-03 DIAGNOSIS — Z71.89 ENCOUNTER FOR BIPAP USE COUNSELING: ICD-10-CM

## 2023-04-03 PROCEDURE — 1036F TOBACCO NON-USER: CPT | Performed by: NURSE PRACTITIONER

## 2023-04-03 PROCEDURE — 1123F ACP DISCUSS/DSCN MKR DOCD: CPT | Performed by: NURSE PRACTITIONER

## 2023-04-03 PROCEDURE — 3074F SYST BP LT 130 MM HG: CPT | Performed by: NURSE PRACTITIONER

## 2023-04-03 PROCEDURE — G8427 DOCREV CUR MEDS BY ELIG CLIN: HCPCS | Performed by: NURSE PRACTITIONER

## 2023-04-03 PROCEDURE — 99214 OFFICE O/P EST MOD 30 MIN: CPT | Performed by: NURSE PRACTITIONER

## 2023-04-03 PROCEDURE — G8417 CALC BMI ABV UP PARAM F/U: HCPCS | Performed by: NURSE PRACTITIONER

## 2023-04-03 PROCEDURE — 3078F DIAST BP <80 MM HG: CPT | Performed by: NURSE PRACTITIONER

## 2023-04-03 PROCEDURE — 3017F COLORECTAL CA SCREEN DOC REV: CPT | Performed by: NURSE PRACTITIONER

## 2023-04-03 RX ORDER — DICLOFENAC SODIUM 75 MG/1
75 TABLET, DELAYED RELEASE ORAL 2 TIMES DAILY
Qty: 60 TABLET | Refills: 0 | Status: SHIPPED | OUTPATIENT
Start: 2023-04-03

## 2023-04-03 ASSESSMENT — SLEEP AND FATIGUE QUESTIONNAIRES
HOW LIKELY ARE YOU TO NOD OFF OR FALL ASLEEP WHILE SITTING INACTIVE IN A PUBLIC PLACE: 1
HOW LIKELY ARE YOU TO NOD OFF OR FALL ASLEEP WHEN YOU ARE A PASSENGER IN A CAR FOR AN HOUR WITHOUT A BREAK: 1
HOW LIKELY ARE YOU TO NOD OFF OR FALL ASLEEP WHILE WATCHING TV: 1
HOW LIKELY ARE YOU TO NOD OFF OR FALL ASLEEP IN A CAR, WHILE STOPPED FOR A FEW MINUTES IN TRAFFIC: 0
ESS TOTAL SCORE: 4
HOW LIKELY ARE YOU TO NOD OFF OR FALL ASLEEP WHILE SITTING AND TALKING TO SOMEONE: 0
HOW LIKELY ARE YOU TO NOD OFF OR FALL ASLEEP WHILE SITTING QUIETLY AFTER LUNCH WITHOUT ALCOHOL: 1
HOW LIKELY ARE YOU TO NOD OFF OR FALL ASLEEP WHILE SITTING AND READING: 0
HOW LIKELY ARE YOU TO NOD OFF OR FALL ASLEEP WHILE LYING DOWN TO REST IN THE AFTERNOON WHEN CIRCUMSTANCES PERMIT: 0
NECK CIRCUMFERENCE (INCHES): 19.75

## 2023-04-03 NOTE — PROGRESS NOTES
within this time frame. 30/30days with greater than 4 hours of machine use. BIPAP 17/13 cm H20    Compliance download report from 10/31/18 to 11/29/18 reviewed today by me and showed patient is using machine 6:52 hrs/night with 100% compliance and AHI 5.7 within this time frame. 30/30days with greater than 4 hours of machine use. BIPAP 17/13 cm H20    BiPAP compliance report from 12/1/18-12/30/18 on BiPAP 18/14 cm H2O reviewed. Compliance is good 100%. AHI is improving still slightly elevated 5.2. BiPAP compliance report from 1/8/19-2/6/19 on BiPAP 19/15 cm H2O reviewed. Compliance is good 100% AHI is good 3.9    Compliance download report from 11/2/19 to 12/1/19 reviewed today by me and showed patient is using machine 7:26 hrs/night with 100% compliance and AHI 4.1 within this time frame. 30/30days with greater than 4 hours of machine use. BIPAP 19/15 cm H20    12/4/2020-unable to obtain BiPAP download report. Patient has older BiPAP with no modem    BiPAP compliance report from 11/4/2020-12/3/2020 on BiPAP 19/15 cm H2O reviewed. Compliance is great 100%. AHI is good 3.2. New BIPAP:  Compliance download report from 11/3/21 to 12/2/21 reviewed today by me and showed patient is using machine 7:37 hrs/night with 100% compliance and AHI 2.9 within this time frame. 30/30days with greater than 4 hours of machine use. BIPAP 19/15 cm H20     Compliance download report from 11/7/22 to 12/6/22 reviewed today by me and showed patient is using machine 7:23 hrs/night with 100% compliance and AHI 4.8 within this time frame. 30/30days with greater than 4 hours of machine use. BIPAP 19/15 cm H20     Compliance download report from 2/26/23 to 3/27/23 reviewed today by me and showed patient is using machine 7:36 hrs/night with 100% compliance and AHI 3.1 within this time frame. 30/30days with greater than 4 hours of machine use. BIPAP 20/16 cm H20     Assessment:      Severe AJ. BIPAP 20/16 cm H2O.  Optimal

## 2023-04-03 NOTE — TELEPHONE ENCOUNTER
Pt said that when he was in the hospital for his hip replacement they took him off of one of his meds. He states that the Diclofenac Sodium is what they stopped him taking, didn't know if you wanted him to continue it or not. And if you do he will need it sent to [address] [place] [address].

## 2023-04-03 NOTE — PATIENT INSTRUCTIONS
comfortable. A cooler room along with enough blankets to stay warm is recommended. If your room is too noisy, try a white noise machine. If too bright, try black out shades or an eye mask. Dont take worries to bed. Leave worries about work, school etc. behind you when you go to bed. Some people find it helpful to assign a worry period in the evening or late afternoon to write down your worries and get them out of your system. CPAP Equipment Cleaning and Disinfecting Schedule  Equipment Cleaning Frequency Instructions  Disinfecting Frequency   Non-Disposable Filters  Weekly Mild soapy water, Rinse, Air Dry Not Required   Disposable Filters Change as needed  2-4 weeks Do Not Wash Not Required   Hose/tubing Daily Mild soapy water, Rinse, Air Dry Once a week   Mask / Nasal Pillows Daily Mild soapy water, Rinse, Air Dry Once a week   Headgear Weekly Hand wash, Mild soapy water, Rinse, Dry  Not Required   Humidifier Daily Empty water daily  Mild soapy water, Rinse well, Air Dry  Once a week   CPAP Unit As Needed Dust with damp cloth,  No detergents or sprays Not Required         Disinfect (per schedule) with 1 part white vinegar and 3 parts water- soak mask and water chamber for 30 minutes every 1-2 weeks, more often if sick. Allow water/vinegar mixture to run through tubing. Allow all equipment to air dry. Drying Hints:   Always hang tubing away from direct sunlight, as this will cause the tubing to become yellow, brittle and crack over a period of time. DO NOT attach the wet tubing to your CPAP unit to blow-dry it. The moisture from the tubing can drain back into your machine. Moisture in your unit can cause sudden pressure increases or short circuits  DO's and DON'Ts:  - Don't use alcohol-based products to clean your mask, because it can cause the materials to become hard and brittle.    - Don't put headgear in the washer or dryer  - Don't use any caustic or household cleaning solutions such as bleach on

## 2023-04-04 ENCOUNTER — TELEPHONE (OUTPATIENT)
Dept: FAMILY MEDICINE CLINIC | Age: 68
End: 2023-04-04

## 2023-04-04 DIAGNOSIS — J32.9 SINUSITIS, UNSPECIFIED CHRONICITY, UNSPECIFIED LOCATION: Primary | ICD-10-CM

## 2023-04-04 NOTE — TELEPHONE ENCOUNTER
Pt states thst he thinks that he has a sinus infection and has had it for about 2 to 3 weeks but has gotten really bad the lasy 3 to 4 days. He just had a hip replacement and is worried about infection getting to it. Was wanting to see if he could get in to be seen.

## 2023-04-05 RX ORDER — AMOXICILLIN AND CLAVULANATE POTASSIUM 875; 125 MG/1; MG/1
1 TABLET, FILM COATED ORAL 2 TIMES DAILY
Qty: 20 TABLET | Refills: 0 | Status: SHIPPED | OUTPATIENT
Start: 2023-04-05 | End: 2023-04-15

## 2023-04-05 NOTE — TELEPHONE ENCOUNTER
Called pt and informed. Please send Rx for Augmentin 875 mg quantity #20 take 1 p.o. twice daily to [address]

## 2023-04-05 NOTE — TELEPHONE ENCOUNTER
I have a hard stop for a meeting.   We can send Augmentin 875 mg quantity #20 take 1 p.o. twice daily

## 2023-04-05 NOTE — TELEPHONE ENCOUNTER
Pt called due to not getting a call back yesterday regarding message. Able to double book anywhere this afternoon?

## 2023-04-17 DIAGNOSIS — J32.9 SINUSITIS, UNSPECIFIED CHRONICITY, UNSPECIFIED LOCATION: ICD-10-CM

## 2023-04-18 ENCOUNTER — OFFICE VISIT (OUTPATIENT)
Dept: FAMILY MEDICINE CLINIC | Age: 68
End: 2023-04-18

## 2023-04-18 VITALS
WEIGHT: 251.4 LBS | SYSTOLIC BLOOD PRESSURE: 130 MMHG | HEART RATE: 65 BPM | DIASTOLIC BLOOD PRESSURE: 66 MMHG | BODY MASS INDEX: 36.07 KG/M2 | OXYGEN SATURATION: 99 %

## 2023-04-18 DIAGNOSIS — M54.6 CHRONIC BILATERAL THORACIC BACK PAIN: ICD-10-CM

## 2023-04-18 DIAGNOSIS — G89.29 CHRONIC BILATERAL THORACIC BACK PAIN: ICD-10-CM

## 2023-04-18 DIAGNOSIS — I10 ESSENTIAL HYPERTENSION, BENIGN: ICD-10-CM

## 2023-04-18 DIAGNOSIS — C61 MALIGNANT NEOPLASM OF PROSTATE (HCC): ICD-10-CM

## 2023-04-18 DIAGNOSIS — K21.9 GASTROESOPHAGEAL REFLUX DISEASE WITHOUT ESOPHAGITIS: ICD-10-CM

## 2023-04-18 DIAGNOSIS — Z72.0 TOBACCO ABUSE: ICD-10-CM

## 2023-04-18 DIAGNOSIS — E78.2 MIXED HYPERLIPIDEMIA: ICD-10-CM

## 2023-04-18 DIAGNOSIS — M75.41 IMPINGEMENT SYNDROME OF BOTH SHOULDERS: Primary | ICD-10-CM

## 2023-04-18 DIAGNOSIS — M75.42 IMPINGEMENT SYNDROME OF BOTH SHOULDERS: Primary | ICD-10-CM

## 2023-04-18 DIAGNOSIS — G47.33 SEVERE OBSTRUCTIVE SLEEP APNEA: ICD-10-CM

## 2023-04-18 RX ORDER — AMOXICILLIN AND CLAVULANATE POTASSIUM 875; 125 MG/1; MG/1
1 TABLET, FILM COATED ORAL 2 TIMES DAILY
Qty: 20 TABLET | Refills: 0 | Status: SHIPPED | OUTPATIENT
Start: 2023-04-18 | End: 2023-04-28

## 2023-04-18 SDOH — ECONOMIC STABILITY: HOUSING INSECURITY
IN THE LAST 12 MONTHS, WAS THERE A TIME WHEN YOU DID NOT HAVE A STEADY PLACE TO SLEEP OR SLEPT IN A SHELTER (INCLUDING NOW)?: NO

## 2023-04-18 SDOH — ECONOMIC STABILITY: INCOME INSECURITY: HOW HARD IS IT FOR YOU TO PAY FOR THE VERY BASICS LIKE FOOD, HOUSING, MEDICAL CARE, AND HEATING?: NOT HARD AT ALL

## 2023-04-18 SDOH — ECONOMIC STABILITY: FOOD INSECURITY: WITHIN THE PAST 12 MONTHS, YOU WORRIED THAT YOUR FOOD WOULD RUN OUT BEFORE YOU GOT MONEY TO BUY MORE.: NEVER TRUE

## 2023-04-18 SDOH — ECONOMIC STABILITY: FOOD INSECURITY: WITHIN THE PAST 12 MONTHS, THE FOOD YOU BOUGHT JUST DIDN'T LAST AND YOU DIDN'T HAVE MONEY TO GET MORE.: NEVER TRUE

## 2023-04-18 NOTE — PROGRESS NOTES
Chief Complaint   Patient presents with    Hypertension        Internal Administration   First Dose COVID-19, MODERNA ELIA border, Primary or Immunocompromised, (age 12y+), IM, 100 mcg/0.5mL  2021   Second Dose COVID-19, MODERNA ELIA border, Primary or Immunocompromised, (age 12y+), IM, 100 mcg/0.5mL   2021       Last COVID Lab No results found for: SARS-COV-2, SARS-COV-2 RNA, SARS-COV-2, SARS-COV-2, SARS-COV-2 BY PCR, SARS-COV-2, SARS-COV-2, SARS-COV-2          Wt Readings from Last 3 Encounters:   23 251 lb 6.4 oz (114 kg)   23 248 lb (112.5 kg)   23 233 lb (105.7 kg)     BP Readings from Last 3 Encounters:   23 130/66   23 112/66   23 126/87      Lab Results   Component Value Date    LABA1C 5.5 2023    LABA1C 5.6 2018    LABA1C 5.9 2018       HPI:  Leslie Kinney is a 75 y.o. (: 1955) here today for    He states that his pain is better now. He is no longer seeing pain management. He had a couple months ago a hip replacement and he is ding well since. He continues to see hematology for his low hemoglobin and they are discussing iron infusions. He states that he has been having some issues with activity involving lifting or being physical with his back where he will get a pain in the mid back. He states he will stop and rest and then it gets better but once he gets active again after a while it hurts again too. Informed based on his past MRI of his spine it likely is arthritis related. Informed to be sure a thoracic xray will be ordered he can complete. He also states his shoulder have felt more stiff.  Informed of exercises he can do and will include with his AVS.     [] Patient has completed an advance directive  [x] Patient has NOT completed an advanced directive  [] Patient has a documented healthcare surrogate  [x] Patient does NOT have a documented healthcare surrogate  [] Discussed the importance of establishing and updating

## 2023-04-18 NOTE — PATIENT INSTRUCTIONS
Nicorette gum     Patient should call the office immediately with new or ongoing signs or symptoms or worsening, or proceed to the emergency room. If you are on medications which could impair your senses, you are at risk of weakness, falls, dizziness, or drowsiness. You should be careful during activities which could place you at risk of harm, such as climbing, using stairs, operating machinery, or driving vehicles. If you feel you cannot safely do these activities, you should request others to help you, or avoid the activities altogether. If you are drowsy for any other reason, you should use the same precautions as listed above.      Web Address for Advance Directive:    [employer]

## 2023-04-19 ENCOUNTER — TELEPHONE (OUTPATIENT)
Dept: ORTHOPEDIC SURGERY | Age: 68
End: 2023-04-19

## 2023-04-19 NOTE — TELEPHONE ENCOUNTER
Other PATIENT'S WIFE IS CALLING REQUESTING A CALL BACK. DENTAL APPOINTMENT IS TOMORROW AND SHE WANTS TO KNOW HOW HE SHOULD TAKE HIS MEDICATION.  [facility] [phone]

## 2023-04-23 ENCOUNTER — HOSPITAL ENCOUNTER (OUTPATIENT)
Dept: GENERAL RADIOLOGY | Age: 68
Discharge: HOME | End: 2023-04-23
Payer: MEDICARE

## 2023-04-23 ENCOUNTER — HOSPITAL ENCOUNTER (OUTPATIENT)
Age: 68
Discharge: HOME | End: 2023-04-23
Payer: MEDICARE

## 2023-04-23 DIAGNOSIS — M54.6 CHRONIC BILATERAL THORACIC BACK PAIN: ICD-10-CM

## 2023-04-23 DIAGNOSIS — G89.29 CHRONIC BILATERAL THORACIC BACK PAIN: ICD-10-CM

## 2023-04-23 PROCEDURE — 72070 X-RAY EXAM THORAC SPINE 2VWS: CPT

## 2023-04-24 DIAGNOSIS — G89.29 CHRONIC MIDLINE LOW BACK PAIN, UNSPECIFIED WHETHER SCIATICA PRESENT: Primary | ICD-10-CM

## 2023-04-24 DIAGNOSIS — M54.50 CHRONIC MIDLINE LOW BACK PAIN, UNSPECIFIED WHETHER SCIATICA PRESENT: Primary | ICD-10-CM

## 2023-04-25 ENCOUNTER — OFFICE VISIT (OUTPATIENT)
Dept: ORTHOPEDIC SURGERY | Age: 68
End: 2023-04-25
Payer: MEDICARE

## 2023-04-25 VITALS — HEIGHT: 70 IN | WEIGHT: 251 LBS | BODY MASS INDEX: 35.93 KG/M2

## 2023-04-25 DIAGNOSIS — Z96.641 STATUS POST RIGHT HIP REPLACEMENT: Primary | ICD-10-CM

## 2023-04-25 PROCEDURE — G8417 CALC BMI ABV UP PARAM F/U: HCPCS | Performed by: ORTHOPAEDIC SURGERY

## 2023-04-25 PROCEDURE — 1036F TOBACCO NON-USER: CPT | Performed by: ORTHOPAEDIC SURGERY

## 2023-04-25 PROCEDURE — 1123F ACP DISCUSS/DSCN MKR DOCD: CPT | Performed by: ORTHOPAEDIC SURGERY

## 2023-04-25 PROCEDURE — 99213 OFFICE O/P EST LOW 20 MIN: CPT | Performed by: ORTHOPAEDIC SURGERY

## 2023-04-25 PROCEDURE — 3017F COLORECTAL CA SCREEN DOC REV: CPT | Performed by: ORTHOPAEDIC SURGERY

## 2023-04-25 PROCEDURE — G8427 DOCREV CUR MEDS BY ELIG CLIN: HCPCS | Performed by: ORTHOPAEDIC SURGERY

## 2023-04-25 NOTE — PROGRESS NOTES
Dr Mauricio Dunham      Date /Time 4/25/2023       10:10 AM EST  Name Nataly Blake             1955   Location  [facility]  MRN [id number]                Chief Complaint   Patient presents with    Hip Pain     Right GENIE 01/17/2023        History of Present Illness      Nataly Blake is a 75 y.o. male is here for post-op visit after RIGHT  96902 Total Hip Arthroplasty      Patient is 3 months status post right total hip arthroplasty. Patient had an anterior approach. Patient doing well. Patient denies any fever, chills, or drainage. Pain controlled. Physical Exam    Based off 1997 Exam Criteria    Ht 5' 10\" (1.778 m)   Wt 251 lb (113.9 kg)   BMI 36.01 kg/m²      Constitutional:       General: He is not in acute distress. Appearance: Normal appearance. RIGHT Hip: incision clean, intact, healing appropriately. No surrounding  erythema or fluctuance. Neuro intact distal. No evidence of DVT. Imaging       Right Hip: [facility] [place]  Radiographs: X-rays were ordered and reviewed of the right hip.  3 views. AP pelvis, lateral, and false profile. They demonstrate a right total hip arthroplasty in good position. No evidence of loosening or periprosthetic fracture. Assessment and Plan    Charmaine Pettit was seen today for hip pain. Diagnoses and all orders for this visit:    Status post right hip replacement  -     XR HIP RIGHT (2-3 VIEWS); Future        Patient doing well. Patient will continue with exercises he has learned in physical therapy on his own at home. He will follow-up 1 year from surgery or sooner if problems arise. We have discussed predental and preinvasive procedure antibiotics.     I discussed with Nataly Blake that his history, symptoms, signs, and imaging are most consistent with previous GENIE replacement    We reviewed the natural history of these conditions and treatment options ranging from conservative measures (rest, icing, activity

## 2023-04-26 RX ORDER — CHLORAL HYDRATE 500 MG
CAPSULE ORAL
Qty: 360 CAPSULE | Refills: 5 | Status: SHIPPED | OUTPATIENT
Start: 2023-04-26

## 2023-05-04 RX ORDER — DICLOFENAC SODIUM 75 MG/1
75 TABLET, DELAYED RELEASE ORAL 2 TIMES DAILY
Qty: 60 TABLET | Refills: 2 | Status: SHIPPED | OUTPATIENT
Start: 2023-05-04

## 2023-05-04 NOTE — TELEPHONE ENCOUNTER
[facility] is requesting refill(s)   Last OV 4/18/23 (pertaining to medication)  LR 4/3/23 (per medication requested)  Next office visit scheduled or attempted Yes   If no, reason:  10/24/23

## 2023-05-05 ENCOUNTER — HOSPITAL ENCOUNTER (OUTPATIENT)
Dept: MRI IMAGING | Age: 68
Discharge: HOME | End: 2023-05-05
Payer: MEDICARE

## 2023-05-05 DIAGNOSIS — G89.29 CHRONIC MIDLINE LOW BACK PAIN, UNSPECIFIED WHETHER SCIATICA PRESENT: ICD-10-CM

## 2023-05-05 DIAGNOSIS — M54.50 CHRONIC MIDLINE LOW BACK PAIN, UNSPECIFIED WHETHER SCIATICA PRESENT: ICD-10-CM

## 2023-05-05 PROCEDURE — 72146 MRI CHEST SPINE W/O DYE: CPT

## 2023-05-08 ENCOUNTER — TELEPHONE (OUTPATIENT)
Dept: FAMILY MEDICINE CLINIC | Age: 68
End: 2023-05-08

## 2023-05-08 DIAGNOSIS — G95.20 SPINAL CORD COMPRESSION (HCC): Primary | ICD-10-CM

## 2023-05-08 NOTE — TELEPHONE ENCOUNTER
Pt called wanting to know if he should get a referral to Dr. Ej Marinelli instead of Dr. Creasie Peabody. States that Dr. Ej Marinelli is more of a pain doctor.    Call back pt [phone]

## 2023-05-08 NOTE — TELEPHONE ENCOUNTER
I would like to have the opinion of the spine surgeon as there does appear to be some significant pressure of the back bone on the spinal cord.   He can certainly see Dr. Sabrina Benjamin as well

## 2023-05-17 ENCOUNTER — TELEPHONE (OUTPATIENT)
Dept: FAMILY MEDICINE CLINIC | Age: 68
End: 2023-05-17

## 2023-05-17 NOTE — TELEPHONE ENCOUNTER
Pt called stating that he's scheduled for cataract procedure on 5/30 and 6/20. Pt is needing a pre-op appt. No avail appts with any providers. Pt states if possible Wednesday(5/24) would be best for him.    Call back pt with recommendations [phone]

## 2023-05-25 ENCOUNTER — OFFICE VISIT (OUTPATIENT)
Dept: FAMILY MEDICINE CLINIC | Age: 68
End: 2023-05-25

## 2023-05-25 VITALS
BODY MASS INDEX: 37.59 KG/M2 | OXYGEN SATURATION: 97 % | DIASTOLIC BLOOD PRESSURE: 70 MMHG | HEART RATE: 78 BPM | WEIGHT: 248 LBS | SYSTOLIC BLOOD PRESSURE: 120 MMHG | HEIGHT: 68 IN

## 2023-05-25 DIAGNOSIS — R73.9 HYPERGLYCEMIA: ICD-10-CM

## 2023-05-25 DIAGNOSIS — E78.2 MIXED HYPERLIPIDEMIA: ICD-10-CM

## 2023-05-25 DIAGNOSIS — C61 MALIGNANT NEOPLASM OF PROSTATE (HCC): ICD-10-CM

## 2023-05-25 DIAGNOSIS — F51.01 PRIMARY INSOMNIA: ICD-10-CM

## 2023-05-25 DIAGNOSIS — G47.33 SEVERE OBSTRUCTIVE SLEEP APNEA: ICD-10-CM

## 2023-05-25 DIAGNOSIS — E55.9 VITAMIN D DEFICIENCY: ICD-10-CM

## 2023-05-25 DIAGNOSIS — M1A.0790 IDIOPATHIC CHRONIC GOUT OF FOOT WITHOUT TOPHUS, UNSPECIFIED LATERALITY: ICD-10-CM

## 2023-05-25 DIAGNOSIS — Z01.818 PREOP EXAMINATION: Primary | ICD-10-CM

## 2023-05-25 DIAGNOSIS — I73.9 INTERMITTENT CLAUDICATION (HCC): ICD-10-CM

## 2023-05-25 DIAGNOSIS — K21.9 GASTROESOPHAGEAL REFLUX DISEASE WITHOUT ESOPHAGITIS: ICD-10-CM

## 2023-05-25 DIAGNOSIS — I10 ESSENTIAL HYPERTENSION, BENIGN: ICD-10-CM

## 2023-05-25 DIAGNOSIS — E66.01 SEVERE OBESITY (BMI 35.0-39.9) WITH COMORBIDITY (HCC): ICD-10-CM

## 2023-05-25 RX ORDER — FERROUS SULFATE 325(65) MG
325 TABLET ORAL 2 TIMES DAILY
COMMUNITY
Start: 2023-05-25

## 2023-05-25 NOTE — PROGRESS NOTES
[address]  Ruben Yo CNP                                                                     Preoperative Evaluation        Bijal Douglass  YOB: 1955    Date of Service:  5/25/2023    Vitals:    05/25/23 1443   BP: 120/70   Site: Left Upper Arm   Position: Sitting   Cuff Size: Large Adult   Pulse: 78   SpO2: 97%   Weight: 248 lb (112.5 kg)   Height: 5' 8\" (1.727 m)      Wt Readings from Last 2 Encounters:   05/25/23 248 lb (112.5 kg)   04/25/23 251 lb (113.9 kg)     BP Readings from Last 3 Encounters:   05/25/23 120/70   04/18/23 130/66   04/03/23 112/66        Chief Complaint   Patient presents with    Pre-op Exam     Cataract surgery left eye scheduled 5/30/2023 Right eye scheduled 6/20/2023 with Dr. Berry Ruby at [facility]      Allergies   Allergen Reactions    Suprax [Cefixime] Itching     Swollen hands and itching     Outpatient Medications Marked as Taking for the 5/25/23 encounter (Office Visit) with KARLOS Malik CNP   Medication Sig Dispense Refill    diclofenac (VOLTAREN) 75 MG EC tablet Take 1 tablet by mouth 2 times daily 60 tablet 2    Omega-3 Fatty Acids (FISH OIL) 1000 MG capsule TAKE 6 CAPSULES TWICE DAILY 360 capsule 5    Vitamin D (CHOLECALCIFEROL) 25 MCG (1000 UT) TABS tablet TAKE 2 TABLETS BY MOUTH DAILY 200 tablet 0    omeprazole (PRILOSEC) 40 MG delayed release capsule Take 1 capsule by mouth every morning (before breakfast) 90 capsule 0    enalapril (VASOTEC) 20 MG tablet TAKE 1 TABLET BY MOUTH 2 TIMES DAILY 180 tablet 3    niacin (NIASPAN) 1000 MG extended release tablet TAKE ONE TABLET BY MOUTH NIGHTLY 90 tablet 3    Ascorbic Acid 500 MG CHEW Take 500 mg by mouth daily      simvastatin (ZOCOR) 20 MG tablet TAKE 1 TABLET AT BEDTIME 90 tablet 3    Ferrous Sulfate (IRON) 325

## 2023-05-25 NOTE — PATIENT INSTRUCTIONS
Change in medication regimen before surgery: Take omeprazole 40 mg on morning of surgery with sip of water, and hold all other medications until after surgery, Discontinue NSAIDs (diclofenac 75 mg) 3 days before surgery, Discontinue fish oil and vitamin D 3 days before surgery

## 2023-07-03 RX ORDER — OMEPRAZOLE 40 MG/1
40 CAPSULE, DELAYED RELEASE ORAL
Qty: 90 CAPSULE | Refills: 0 | Status: SHIPPED | OUTPATIENT
Start: 2023-07-03

## 2023-07-11 RX ORDER — DICLOFENAC SODIUM 75 MG/1
TABLET, DELAYED RELEASE ORAL
Qty: 60 TABLET | Refills: 2 | Status: SHIPPED | OUTPATIENT
Start: 2023-07-11

## 2023-07-11 NOTE — TELEPHONE ENCOUNTER
Refill Request     CONFIRM preferrred pharmacy with the patient. If Mail Order Rx - Pend for 90 day refill. Last Seen: Last Seen Department: 5/25/2023  Last Seen by PCP: 5/25/2023    Last Written: 5/4/23    If no future appointment scheduled, route STAFF MESSAGE with patient name to the [facility] for scheduling. Next Appointment:   Future Appointments   Date Time Provider Department Center   10/24/2023  7:20 AM MD Craig Boyce  [place] - [facility]   4/1/2024  8:20 AM KARLOS Zaragoza CNP       Message sent to  to schedule appt with patient?   N/A      Requested Prescriptions     Pending Prescriptions Disp Refills    diclofenac (VOLTAREN) 75 MG EC tablet [Pharmacy Med Name: DICLOFENAC SODIUM DR 75MG DR TABLET DR] 60 tablet 2     Sig: TAKE ONE (1) TABLET BY MOUTH TWO (2) TIMES DAILY

## 2023-07-13 RX ORDER — VITAMIN B COMPLEX
TABLET ORAL
Qty: 200 TABLET | Refills: 0 | Status: SHIPPED | OUTPATIENT
Start: 2023-07-13

## 2023-07-13 NOTE — TELEPHONE ENCOUNTER
Refill Request     CONFIRM preferrred pharmacy with the patient. If Mail Order Rx - Pend for 90 day refill. Last Seen: Last Seen Department: 5/25/2023  Last Seen by PCP: 4/18/2023    Last Written: 3/14/23    If no future appointment scheduled, route STAFF MESSAGE with patient name to the [facility] for scheduling. Next Appointment:   Future Appointments   Date Time Provider Department Center   10/24/2023  7:20 AM [facility] [place] [facility]   4/1/2024  8:20 AM KARLOS Ku - [facility]       Message sent to  to schedule appt with patient?   N/A      Requested Prescriptions     Pending Prescriptions Disp Refills    Vitamin D (CHOLECALCIFEROL) 25 MCG (1000 UT) TABS tablet [Pharmacy Med Name: VITAMIN D-1000 1000UNIT TABLET] 200 tablet 0     Sig: TAKE TWO (2) TABLETS BY MOUTH DAILY

## 2023-09-01 RX ORDER — CHLORAL HYDRATE 500 MG
CAPSULE ORAL
Qty: 300 CAPSULE | Refills: 5 | Status: SHIPPED | OUTPATIENT
Start: 2023-09-01

## 2023-09-01 NOTE — TELEPHONE ENCOUNTER
Refill Request     CONFIRM preferrred pharmacy with the patient. If Mail Order Rx - Pend for 90 day refill. Last Seen: Last Seen Department: 5/25/2023  Last Seen by PCP: 4/18/2023    Last Written: 4-    If no future appointment scheduled, route STAFF MESSAGE with patient name to the [facility] for scheduling. Next Appointment:   Future Appointments   Date Time Provider Department Center   10/24/2023  7:20 AM [facility]yl Mallory [facility] [place] [facility]   4/1/2024  8:20 AM KARLOS Wang - C[facility]       Message sent to  to schedule appt with patient?   N/A      Requested Prescriptions     Pending Prescriptions Disp Refills    Omega-3 Fatty Acids (FISH OIL) 1000 MG capsule [Pharmacy Med Name: FISH OIL 1000MG CAPSULE] 300 capsule 5     Sig: TAKE SIX (6) CAPSULES TWICE DAILY

## 2023-09-06 ENCOUNTER — HOSPITAL ENCOUNTER (OUTPATIENT)
Age: 68
Discharge: HOME | End: 2023-09-06
Payer: MEDICARE

## 2023-09-06 PROCEDURE — 36415 COLL VENOUS BLD VENIPUNCTURE: CPT

## 2023-09-06 PROCEDURE — 84153 ASSAY OF PSA TOTAL: CPT

## 2023-09-07 LAB — PSA SERPL DL<=0.01 NG/ML-MCNC: <0.01 NG/ML (ref 0–4)

## 2023-10-11 RX ORDER — OMEPRAZOLE 40 MG/1
CAPSULE, DELAYED RELEASE ORAL
Qty: 90 CAPSULE | Refills: 0 | Status: SHIPPED | OUTPATIENT
Start: 2023-10-11

## 2023-10-11 NOTE — TELEPHONE ENCOUNTER
Refill Request     CONFIRM preferrred pharmacy with the patient. If Mail Order Rx - Pend for 90 day refill. Last Seen: Last Seen Department: 5/25/2023  Last Seen by PCP: 1/5/2023    Last Written: 7/3/2023    If no future appointment scheduled, route STAFF MESSAGE with patient name to the [facility] for scheduling. Next Appointment:   Future Appointments   Date Time Provider [address] [place] [address]   10/24/2023  7:20 AM Walter Valencia MD [facility] [place] [facility]   1/4/2024  9:15 AM Tania Beckford MD [facility]   4/1/2024  8:20 AM KARLOS Fairbanks CNP       Message sent to [address] [place] [address] to schedule appt with patient?   NO      Requested Prescriptions     Pending Prescriptions Disp Refills    omeprazole (PRILOSEC) 40 MG delayed release capsule [Pharmacy Med Name: OMEPRAZOLE 40MG CAPSULE DR] 90 capsule 0     Sig: TAKE ONE (1) CAPSULE BY MOUTH EVERY MORNING BEFORE BREAKFAST

## 2023-10-24 ENCOUNTER — OFFICE VISIT (OUTPATIENT)
Dept: FAMILY MEDICINE CLINIC | Age: 68
End: 2023-10-24

## 2023-10-24 VITALS
WEIGHT: 254 LBS | HEART RATE: 67 BPM | SYSTOLIC BLOOD PRESSURE: 138 MMHG | BODY MASS INDEX: 38.62 KG/M2 | OXYGEN SATURATION: 95 % | DIASTOLIC BLOOD PRESSURE: 73 MMHG

## 2023-10-24 DIAGNOSIS — K21.9 GASTROESOPHAGEAL REFLUX DISEASE WITHOUT ESOPHAGITIS: ICD-10-CM

## 2023-10-24 DIAGNOSIS — E55.9 VITAMIN D DEFICIENCY: ICD-10-CM

## 2023-10-24 DIAGNOSIS — M75.41 IMPINGEMENT SYNDROME OF BOTH SHOULDERS: ICD-10-CM

## 2023-10-24 DIAGNOSIS — M62.838 TRAPEZIUS MUSCLE SPASM: Primary | ICD-10-CM

## 2023-10-24 DIAGNOSIS — E78.2 MIXED HYPERLIPIDEMIA: ICD-10-CM

## 2023-10-24 DIAGNOSIS — M75.42 IMPINGEMENT SYNDROME OF BOTH SHOULDERS: ICD-10-CM

## 2023-10-24 DIAGNOSIS — R73.9 HYPERGLYCEMIA: ICD-10-CM

## 2023-10-24 DIAGNOSIS — Z13.29 SCREENING FOR THYROID DISORDER: ICD-10-CM

## 2023-10-24 DIAGNOSIS — G47.33 SEVERE OBSTRUCTIVE SLEEP APNEA: ICD-10-CM

## 2023-10-24 DIAGNOSIS — D64.9 ANEMIA, UNSPECIFIED TYPE: ICD-10-CM

## 2023-10-24 DIAGNOSIS — M15.9 PRIMARY OSTEOARTHRITIS INVOLVING MULTIPLE JOINTS: ICD-10-CM

## 2023-10-24 DIAGNOSIS — I10 ESSENTIAL HYPERTENSION, BENIGN: ICD-10-CM

## 2023-10-24 DIAGNOSIS — Z23 NEED FOR PNEUMOCOCCAL VACCINE: ICD-10-CM

## 2023-10-24 DIAGNOSIS — Z23 NEEDS FLU SHOT: ICD-10-CM

## 2023-10-24 LAB
25(OH)D3 SERPL-MCNC: 45.7 NG/ML
ALBUMIN SERPL-MCNC: 4.7 G/DL (ref 3.4–5)
ALBUMIN/GLOB SERPL: 1.9 {RATIO} (ref 1.1–2.2)
ALP SERPL-CCNC: 92 U/L (ref 40–129)
ALT SERPL-CCNC: 51 U/L (ref 10–40)
ANION GAP SERPL CALCULATED.3IONS-SCNC: 13 MMOL/L (ref 3–16)
AST SERPL-CCNC: 37 U/L (ref 15–37)
BASOPHILS # BLD: 0 K/UL (ref 0–0.2)
BASOPHILS NFR BLD: 0.5 %
BILIRUB SERPL-MCNC: 0.4 MG/DL (ref 0–1)
BUN SERPL-MCNC: 21 MG/DL (ref 7–20)
CALCIUM SERPL-MCNC: 9.6 MG/DL (ref 8.3–10.6)
CHLORIDE SERPL-SCNC: 100 MMOL/L (ref 99–110)
CHOLEST SERPL-MCNC: 148 MG/DL (ref 0–199)
CO2 SERPL-SCNC: 27 MMOL/L (ref 21–32)
CREAT SERPL-MCNC: 1.2 MG/DL (ref 0.8–1.3)
DEPRECATED RDW RBC AUTO: 14.1 % (ref 12.4–15.4)
EOSINOPHIL # BLD: 0.2 K/UL (ref 0–0.6)
EOSINOPHIL NFR BLD: 3.6 %
FERRITIN SERPL IA-MCNC: 903.2 NG/ML (ref 30–400)
GFR SERPLBLD CREATININE-BSD FMLA CKD-EPI: >60 ML/MIN/{1.73_M2}
GLUCOSE SERPL-MCNC: 105 MG/DL (ref 70–99)
HCT VFR BLD AUTO: 36.2 % (ref 40.5–52.5)
HDLC SERPL-MCNC: 32 MG/DL (ref 40–60)
HGB BLD-MCNC: 12.6 G/DL (ref 13.5–17.5)
IRON SATN MFR SERPL: 40 % (ref 20–50)
IRON SERPL-MCNC: 104 UG/DL (ref 59–158)
LDLC SERPL CALC-MCNC: 85 MG/DL
LYMPHOCYTES # BLD: 1.5 K/UL (ref 1–5.1)
LYMPHOCYTES NFR BLD: 23.7 %
MCH RBC QN AUTO: 33.5 PG (ref 26–34)
MCHC RBC AUTO-ENTMCNC: 34.8 G/DL (ref 31–36)
MCV RBC AUTO: 96.3 FL (ref 80–100)
MONOCYTES # BLD: 0.5 K/UL (ref 0–1.3)
MONOCYTES NFR BLD: 7.1 %
NEUTROPHILS # BLD: 4.2 K/UL (ref 1.7–7.7)
NEUTROPHILS NFR BLD: 65.1 %
PLATELET # BLD AUTO: 227 K/UL (ref 135–450)
PMV BLD AUTO: 9.4 FL (ref 5–10.5)
POTASSIUM SERPL-SCNC: 4.9 MMOL/L (ref 3.5–5.1)
PROT SERPL-MCNC: 7.2 G/DL (ref 6.4–8.2)
RBC # BLD AUTO: 3.76 M/UL (ref 4.2–5.9)
SODIUM SERPL-SCNC: 140 MMOL/L (ref 136–145)
T4 FREE SERPL-MCNC: 1.3 NG/DL (ref 0.9–1.8)
TIBC SERPL-MCNC: 257 UG/DL (ref 260–445)
TRIGL SERPL-MCNC: 157 MG/DL (ref 0–150)
TSH SERPL DL<=0.005 MIU/L-ACNC: 8.7 UIU/ML (ref 0.27–4.2)
VLDLC SERPL CALC-MCNC: 31 MG/DL
WBC # BLD AUTO: 6.4 K/UL (ref 4–11)

## 2023-10-24 RX ORDER — CYCLOBENZAPRINE HCL 10 MG
10 TABLET ORAL NIGHTLY PRN
Qty: 30 TABLET | Refills: 2 | Status: SHIPPED | OUTPATIENT
Start: 2023-10-24 | End: 2024-01-22

## 2023-10-24 NOTE — PROGRESS NOTES
Chief Complaint   Patient presents with    Back Pain    Hypertension        Internal Administration   First Dose COVID-19, MODERNA ELIA border, Primary or Immunocompromised, (age 12y+), IM, 100 mcg/0.5mL  2021   Second Dose COVID-19, MODERNA ELIA border, Primary or Immunocompromised, (age 12y+), IM, 100 mcg/0.5mL   2021       Last COVID Lab POC Glucose (mg/dl)   Date Value   2023 99             Wt Readings from Last 3 Encounters:   10/24/23 115.2 kg (254 lb)   23 112.5 kg (248 lb)   23 113.9 kg (251 lb)     BP Readings from Last 3 Encounters:   10/24/23 138/73   23 120/70   23 130/66      Lab Results   Component Value Date    LABA1C 5.5 2023    LABA1C 5.6 2018    LABA1C 5.9 2018       HPI:  Brittanie Pittman is a 75 y.o. (: 1955) here today for    Patient states that his back pain has been okay, standing can be rough but he can walk okay. He does admit he has been having issues with bilateral shoulders. He states that at night his shoulders just get so tight. He has tried using a massager on his shoulders but its makes it difficult to sleep. Discussed using a muscle relaxant to help. Will send flexeril 10mg nightly. He states that he has been working to stopping smoking. He states he is down to only a cigarette on occasion. He asked about his lower hemoglobin. He states that he is still taking iron and they advised him to take it with vitamin C. Will check his iron levels today. [] Patient has completed an advance directive  [x] Patient has NOT completed an advanced directive  [] Patient has a documented healthcare surrogate  [x] Patient does NOT have a documented healthcare surrogate  [] Discussed the importance of establishing and updating an advanced directive. Patient has questions at this time and those were answered. [x] Discussed the importance of establishing and updating an advanced directive.   Patient does NOT have

## 2023-10-25 DIAGNOSIS — E03.9 ACQUIRED HYPOTHYROIDISM: Primary | ICD-10-CM

## 2023-10-25 RX ORDER — LEVOTHYROXINE SODIUM 0.03 MG/1
25 TABLET ORAL DAILY
Qty: 30 TABLET | Refills: 2 | Status: SHIPPED | OUTPATIENT
Start: 2023-10-25

## 2023-10-30 NOTE — PROGRESS NOTES
[facility], [address] [place] [address] Keenan Denney, [address]  Phone: [phone], [phone]    Date: 3/29/2023          Patient Name; :  Marietta Warner; 1955   Dx/ICD Code:                       Physician:  Kathy Solorzano        Total PT Visits: 9     Measures Previous Current   Pain (0-10) 0/10 0/10   Disability % 36% 19%        ROM R Hip Flex/Abd 50 °  R Hip Flexion 60  R Hip Abd 55 °         Strength  R Hip 4+/5 grossly                Specific Functional Improvements & Impressions:  Pt has been seen for 9 visits over the past 6 weeks. Patient reports feeling he is back to PLOF and is I with HEP that he could continue to perform HEP at home and be finished with formal PT. Pt has met all goals and is d/c'd from formal outpatient PT after treatment today. Plan & Recommendations:  [] Continue rehabilitation due to objective improvement and continued functional deficits with frequency and duration:   [] Progress toward  []GAP, []Work Conditioning, []Independent HEP   [x] Discharge due to   [] All goals achieved, [x] Maximized \"medical necessity\" [] No subjective or objective improvements      Electronically signed by:  Wally Summers, PT, MPT,ATC, cert DN       Therapy Plan of Care Re-Certification  This patient has been re-evaluated for physical therapy services and for therapy to continue, Medicare, Medicaid and other insurances require periodic physician review of the treatment plan.  Please review the above re-evaluation and verify that you agree with plan of care as established above by signing the attached document and return it to our office or note changes to established plan below  [] Follow treatment plan as above [] Discontinue physical therapy  [] Change plan to:                                 __________________________________________________    Physician Signature:____________________________________ Date:____________  By signing above, therapists plan is approved by none

## 2023-11-03 RX ORDER — MULTIVIT-MIN/IRON/FOLIC ACID/K 18-600-40
CAPSULE ORAL
Qty: 200 TABLET | Refills: 0 | Status: SHIPPED | OUTPATIENT
Start: 2023-11-03

## 2023-11-03 NOTE — TELEPHONE ENCOUNTER
Refill Request     CONFIRM preferrred pharmacy with the patient. If Mail Order Rx - Pend for 90 day refill. Last Seen: Last Seen Department: 10/24/2023  Last Seen by PCP: 5/25/2023    Last Written: 7/13/2023    If no future appointment scheduled, route STAFF MESSAGE with patient name to the [facility] for scheduling. Next Appointment:   Future Appointments   Date Time Provider [address] [place] [address]   1/4/2024  9:15 AM Onesimo Hearn MD AND LUIS MMA   1/25/2024  8:00 AM SCHEDULE, MHCX TOMY SERRANO [facility] [place] - DYD   4/1/2024  8:20 AM KARLOS Ruiz CNP CLEJUAN PULM [facility]   4/24/2024  7:20 AM Harshil Herrera MD [facility] [place] - [place]       Message sent to [address] [place] [address] to schedule appt with patient?   NO      Requested Prescriptions     Pending Prescriptions Disp Refills    D3-1000 25 MCG (1000 UT) TABS tablet [Pharmacy Med Name: D3-1000 1000UNIT TABLET] 200 tablet 0     Sig: TAKE TWO (2) TABLETS BY MOUTH DAILY

## 2023-11-13 RX ORDER — NIACIN 1000 MG/1
TABLET, EXTENDED RELEASE ORAL
Qty: 90 TABLET | Refills: 3 | Status: SHIPPED | OUTPATIENT
Start: 2023-11-13

## 2023-11-13 NOTE — TELEPHONE ENCOUNTER
Refill Request     CONFIRM preferrred pharmacy with the patient. If Mail Order Rx - Pend for 90 day refill. Last Seen: Last Seen Department: 10/24/2023  Last Seen by PCP: 10/24/2023    Last Written: 2/8/23    If no future appointment scheduled, route STAFF MESSAGE with patient name to the [facility] for scheduling. Next Appointment:   Future Appointments   Date Time Provider [facility] [address]   1/4/2024  9:15 AM Verónica Stahl MD AND ORTHO TRISH   1/25/2024  8:00 AM SCHEDULE, MHCX MT ORAB [facility] [place]   4/1/2024  8:20 AM KARLOS Christie CNP CLEJUAN PULM MMA   4/24/2024  7:20 AM Joy Tony MD [facility] [place]       Message sent to [address] [place] [address] to schedule appt with patient?   N/A      Requested Prescriptions     Pending Prescriptions Disp Refills    niacin (NIASPAN) 1000 MG extended release tablet [Pharmacy Med Name: NIACIN ER 1000MG TABLET ER] 90 tablet 3     Sig: TAKE ONE TABLET BY MOUTH NIGHTLY

## 2023-11-20 RX ORDER — DICLOFENAC SODIUM 75 MG/1
TABLET, DELAYED RELEASE ORAL
Qty: 60 TABLET | Refills: 2 | Status: SHIPPED | OUTPATIENT
Start: 2023-11-20

## 2023-11-20 NOTE — TELEPHONE ENCOUNTER
Refill Request     CONFIRM preferrred pharmacy with the patient. If Mail Order Rx - Pend for 90 day refill. Last Seen: Last Seen Department: 10/24/2023  Last Seen by PCP: 10/24/2023    Last Written: 7/11/23    If no future appointment scheduled, route STAFF MESSAGE with patient name to the [facility] for scheduling. Next Appointment:   Future Appointments   Date Time Provider [address] [place] [address]   1/4/2024  9:15 AM John Ashraf MD AND LUIS CHAMBERS   1/25/2024  8:00 AM SCHEDULE, MHCX [place] ORAB [facility] [place]   4/1/2024  8:20 AM KARLOS Cerrato - CNP CLEJUAN PULM MMA   4/24/2024  7:20 AM [facility] [place] [facility]       Message sent to [address] [place] [address] to schedule appt with patient?   N/A      Requested Prescriptions     Pending Prescriptions Disp Refills    diclofenac (VOLTAREN) 75 MG EC tablet [Pharmacy Med Name: DICLOFENAC SODIUM DR 75MG DR TABLET DR] 60 tablet 2     Sig: TAKE ONE (1) TABLET BY MOUTH TWO (2) TIMES DAILY

## 2023-11-29 RX ORDER — SIMVASTATIN 20 MG
TABLET ORAL
Qty: 90 TABLET | Refills: 3 | Status: SHIPPED | OUTPATIENT
Start: 2023-11-29

## 2023-11-29 NOTE — TELEPHONE ENCOUNTER
Refill Request     CONFIRM preferrred pharmacy with the patient. If Mail Order Rx - Pend for 90 day refill. Last Seen: Last Seen Department: 10/24/2023  Last Seen by PCP: 10/24/2023    Last Written: 12/7/22    If no future appointment scheduled, route STAFF MESSAGE with patient name to the [facility] for scheduling. Next Appointment:   Future Appointments   Date Time Provider [facility] [address]   1/4/2024  9:15 AM Daniella Heaton MD AND ORTHO TRISH   1/25/2024  8:00 AM SCHEDULE, MHCX TOMY SERRANO [facility] [place]   4/1/2024  8:20 AM KARLOS Schulte - CNP CLERM PULM MMA   4/24/2024  7:20 AM Laura Ramon MD [facility] [place]       Message sent to [address] [place] [address] to schedule appt with patient?   N/A      Requested Prescriptions     Pending Prescriptions Disp Refills    simvastatin (ZOCOR) 20 MG tablet [Pharmacy Med Name: SIMVASTATIN 20MG TABLET] 90 tablet 3     Sig: TAKE ONE (1) TABLET AT BEDTIME

## 2023-12-14 RX ORDER — ENALAPRIL MALEATE 20 MG/1
TABLET ORAL
Qty: 180 TABLET | Refills: 0 | Status: SHIPPED | OUTPATIENT
Start: 2023-12-14

## 2023-12-14 NOTE — TELEPHONE ENCOUNTER
Refill Request     CONFIRM preferrred pharmacy with the patient. If Mail Order Rx - Pend for 90 day refill. Last Seen: Last Seen Department: 10/24/2023  Last Seen by PCP: 10/24/2023    Last Written: 3/1/23    If no future appointment scheduled, route STAFF MESSAGE with patient name to the [facility] for scheduling. Next Appointment:   Future Appointments   Date Time Provider [address]   1/4/2024  9:15 AM Linda Esquivel MD AND ORTHO MMA   1/25/2024  8:00 [facility] [place] [facility]   4/1/2024  8:20 AM KARLOS Garcia - CNP CLERM PULM MMA   4/24/2024  7:20 AM Moose Pete MD [facility] [place]       Message sent to [address] [place] [address] to schedule appt with patient?   NO      Requested Prescriptions     Pending Prescriptions Disp Refills    enalapril (VASOTEC) 20 MG tablet [Pharmacy Med Name: ENALAPRIL MALEATE 20MG TABLET] 180 tablet 3     Sig: TAKE ONE (1) TABLET BY MOUTH TWO (2) TIMES DAILY

## 2023-12-28 DIAGNOSIS — E03.9 ACQUIRED HYPOTHYROIDISM: ICD-10-CM

## 2023-12-28 RX ORDER — LEVOTHYROXINE SODIUM 0.03 MG/1
25 TABLET ORAL DAILY
Qty: 30 TABLET | Refills: 2 | Status: SHIPPED | OUTPATIENT
Start: 2023-12-28

## 2023-12-28 NOTE — TELEPHONE ENCOUNTER
Refill Request     CONFIRM preferrred pharmacy with the patient. If Mail Order Rx - Pend for 90 day refill. Last Seen: Last Seen Department: 10/24/2023  Last Seen by PCP: 10/24/2023    Last Written: 10/25/2023    If no future appointment scheduled, route STAFF MESSAGE with patient name to the [facility] for scheduling. Next Appointment:   Future Appointments   Date Time Provider [address] [place] [address]   1/4/2024  9:15 AM Omega Jarvis MD AND ORTHO TRISH   1/25/2024  8:00 [facility] [place] [facility]   4/1/2024  8:20 AM KARLOS Ho - CNP CLERM PULM [facility]   4/24/2024  7:20 AM Kendell Conrad MD [facility] [place]       Message sent to [address] [place] [address] to schedule appt with patient?   NO      Requested Prescriptions     Pending Prescriptions Disp Refills    levothyroxine (SYNTHROID) 25 MCG tablet [Pharmacy Med Name: LEVOTHYROXINE SODIUM 25MCG TABLET] 30 tablet 2     Sig: TAKE ONE (1) TABLET BY MOUTH DAILY

## 2024-01-02 ENCOUNTER — TELEPHONE (OUTPATIENT)
Dept: TELEMETRY | Age: 69
End: 2024-01-02

## 2024-01-02 DIAGNOSIS — Z12.2 SCREENING FOR LUNG CANCER: ICD-10-CM

## 2024-01-02 DIAGNOSIS — Z72.0 TOBACCO ABUSE: Primary | ICD-10-CM

## 2024-01-02 NOTE — TELEPHONE ENCOUNTER
Patient due for annual CT Lung Screening. Reminder letter mailed.    CT Lung Screening order has been pended to chart should you choose to sign it.  Routed to PCP.    Janae Coy RN

## 2024-01-04 ENCOUNTER — OFFICE VISIT (OUTPATIENT)
Dept: ORTHOPEDIC SURGERY | Age: 69
End: 2024-01-04
Payer: MEDICARE

## 2024-01-04 VITALS — HEIGHT: 68 IN | WEIGHT: 247 LBS | BODY MASS INDEX: 37.44 KG/M2

## 2024-01-04 DIAGNOSIS — Z96.641 STATUS POST RIGHT HIP REPLACEMENT: Primary | ICD-10-CM

## 2024-01-04 PROCEDURE — 3017F COLORECTAL CA SCREEN DOC REV: CPT | Performed by: ORTHOPAEDIC SURGERY

## 2024-01-04 PROCEDURE — G8417 CALC BMI ABV UP PARAM F/U: HCPCS | Performed by: ORTHOPAEDIC SURGERY

## 2024-01-04 PROCEDURE — 99213 OFFICE O/P EST LOW 20 MIN: CPT | Performed by: ORTHOPAEDIC SURGERY

## 2024-01-04 PROCEDURE — 1036F TOBACCO NON-USER: CPT | Performed by: ORTHOPAEDIC SURGERY

## 2024-01-04 PROCEDURE — G8484 FLU IMMUNIZE NO ADMIN: HCPCS | Performed by: ORTHOPAEDIC SURGERY

## 2024-01-04 PROCEDURE — 1123F ACP DISCUSS/DSCN MKR DOCD: CPT | Performed by: ORTHOPAEDIC SURGERY

## 2024-01-04 PROCEDURE — G8427 DOCREV CUR MEDS BY ELIG CLIN: HCPCS | Performed by: ORTHOPAEDIC SURGERY

## 2024-01-04 NOTE — PROGRESS NOTES
Dr Larry Leach      Date /Time 4/25/2023       10:10 AM EST  Name Damian Pandey             1955   [facility]  MRN [id number]                Chief Complaint   Patient presents with    Hip Pain     Right GENIE 01/17/2023        History of Present Illness      Damian Pandey is a 67 y.o. male is here for post-op visit after RIGHT  [address] Total Hip Arthroplasty      Patient is 3 months status post right total hip arthroplasty.  Patient had an anterior approach.  Patient doing well.  Patient denies any fever, chills, or drainage.  Pain controlled.    Physical Exam    Based off 1997 Exam Criteria    Ht 5' 10\" (1.778 m)   Wt 251 lb (113.9 kg)   BMI 36.01 kg/m²      Constitutional:       General: He is not in acute distress.     Appearance: Normal appearance.     RIGHT Hip: incision clean, intact, healing appropriately. No surrounding  erythema or fluctuance. Neuro intact distal. No evidence of DVT.        Imaging       Right Hip: [facility]  Radiographs: X-rays were ordered and reviewed of the right hip.  3 views.  AP pelvis, lateral, and false profile.  They demonstrate a right total hip arthroplasty in good position.  No evidence of loosening or periprosthetic fracture.      Assessment and Plan    Damian was seen today for hip pain.    Diagnoses and all orders for this visit:    Status post right hip replacement  -     XR HIP RIGHT (2-3 VIEWS); Future        Patient doing well.  Patient will continue with exercises he has learned in physical therapy on his own at home.  He will follow-up 1 year from surgery or sooner if problems arise.  We have discussed predental and preinvasive procedure antibiotics.    I discussed with Damian Pandey that his history, symptoms, signs, and imaging are most consistent with previous GENIE replacement    We reviewed the natural history of these conditions and treatment options ranging from conservative measures (rest, icing, activity

## 2024-01-04 NOTE — TELEPHONE ENCOUNTER
Patient called confused by message.  Informed patient he needs a CT lung screen, order placed by Dr. Moe, and gave Central scheduling number to call and schedule with Sharon or Mateo Hampton.

## 2024-01-17 ENCOUNTER — HOSPITAL ENCOUNTER (OUTPATIENT)
Dept: CT IMAGING | Age: 69
Discharge: HOME | End: 2024-01-17
Attending: FAMILY MEDICINE
Payer: MEDICARE

## 2024-01-17 DIAGNOSIS — Z72.0 TOBACCO ABUSE: ICD-10-CM

## 2024-01-17 DIAGNOSIS — Z12.2 SCREENING FOR LUNG CANCER: ICD-10-CM

## 2024-01-17 PROCEDURE — 71271 CT THORAX LUNG CANCER SCR C-: CPT

## 2024-01-22 RX ORDER — DICLOFENAC SODIUM 75 MG/1
TABLET, DELAYED RELEASE ORAL
Qty: 60 TABLET | Refills: 2 | Status: SHIPPED | OUTPATIENT
Start: 2024-01-22

## 2024-01-22 RX ORDER — OMEPRAZOLE 40 MG/1
CAPSULE, DELAYED RELEASE ORAL
Qty: 90 CAPSULE | Refills: 0 | Status: SHIPPED | OUTPATIENT
Start: 2024-01-22

## 2024-01-25 ENCOUNTER — NURSE ONLY (OUTPATIENT)
Dept: FAMILY MEDICINE CLINIC | Age: 69
End: 2024-01-25
Payer: MEDICARE

## 2024-01-25 DIAGNOSIS — I10 ESSENTIAL HYPERTENSION, BENIGN: ICD-10-CM

## 2024-01-25 DIAGNOSIS — E03.9 ACQUIRED HYPOTHYROIDISM: Primary | ICD-10-CM

## 2024-01-25 LAB
BASOPHILS # BLD: 0 K/UL (ref 0–0.2)
BASOPHILS NFR BLD: 0.4 %
DEPRECATED RDW RBC AUTO: 13.5 % (ref 12.4–15.4)
EOSINOPHIL # BLD: 0.1 K/UL (ref 0–0.6)
EOSINOPHIL NFR BLD: 1.9 %
HCT VFR BLD AUTO: 33.1 % (ref 40.5–52.5)
HGB BLD-MCNC: 11.5 G/DL (ref 13.5–17.5)
LYMPHOCYTES # BLD: 1.4 K/UL (ref 1–5.1)
LYMPHOCYTES NFR BLD: 24.7 %
MCH RBC QN AUTO: 32.3 PG (ref 26–34)
MCHC RBC AUTO-ENTMCNC: 34.7 G/DL (ref 31–36)
MCV RBC AUTO: 93.1 FL (ref 80–100)
MONOCYTES # BLD: 0.5 K/UL (ref 0–1.3)
MONOCYTES NFR BLD: 8.6 %
NEUTROPHILS # BLD: 3.6 K/UL (ref 1.7–7.7)
NEUTROPHILS NFR BLD: 64.4 %
PLATELET # BLD AUTO: 354 K/UL (ref 135–450)
PMV BLD AUTO: 9.2 FL (ref 5–10.5)
RBC # BLD AUTO: 3.55 M/UL (ref 4.2–5.9)
T4 FREE SERPL-MCNC: 1.3 NG/DL (ref 0.9–1.8)
T4 FREE SERPL-MCNC: 1.5 NG/DL (ref 0.9–1.8)
TSH SERPL DL<=0.005 MIU/L-ACNC: 6.67 UIU/ML (ref 0.27–4.2)
WBC # BLD AUTO: 5.5 K/UL (ref 4–11)

## 2024-01-25 PROCEDURE — 36415 COLL VENOUS BLD VENIPUNCTURE: CPT | Performed by: FAMILY MEDICINE

## 2024-01-26 LAB — T3FREE SERPL-MCNC: 3.2 PG/ML (ref 2.3–4.2)

## 2024-02-05 DIAGNOSIS — R93.89 ABNORMAL CT OF THE CHEST: Primary | ICD-10-CM

## 2024-02-20 RX ORDER — MULTIVIT-MIN/IRON/FOLIC ACID/K 18-600-40
CAPSULE ORAL
Qty: 200 TABLET | Refills: 0 | Status: SHIPPED | OUTPATIENT
Start: 2024-02-20

## 2024-02-20 NOTE — TELEPHONE ENCOUNTER
Future appt scheduled 04/24/2024                    Last appt 10/24/2023      Last Written 11/03/2023    D3-1000 25 MCG (1000 UT) TABS tablet  #200  0 RF

## 2024-02-23 RX ORDER — CHLORAL HYDRATE 500 MG
CAPSULE ORAL
Qty: 300 CAPSULE | Refills: 0 | Status: SHIPPED | OUTPATIENT
Start: 2024-02-23

## 2024-02-23 NOTE — TELEPHONE ENCOUNTER
Refill Request     CONFIRM preferrred pharmacy with the patient.    If Mail Order Rx - Pend for 90 day refill.      Last Seen: Last Seen Department: 10/24/2023  Last Seen by PCP: 10/24/2023    Last Written: 1/20/2024    If no future appointment scheduled, route STAFF MESSAGE with patient name to the  Pool for scheduling.      Next Appointment:   Future Appointments   Date Time Provider Department Center   4/1/2024  8:20 AM Emily Beatty APRN - CNP CLERM PULM [facility] [place]   4/17/2024  7:30 AM MHC CT MAIN MHCZ CT [facility]   4/24/2024  7:20 AM [place]arciso Moe [place]  [place]   7/29/2024  8:20 AM SCHEDULE, MHCX [place] ORAB [facility] [place]  [place]       Message sent to  to schedule appt with patient?  NO      Requested Prescriptions     Pending Prescriptions Disp Refills    Omega-3 Fatty Acids (FISH OIL) 1000 MG capsule [Pharmacy Med Name: FISH OIL 1000MG CAPSULE] 300 capsule 5     Sig: TAKE SIX (6) CAPSULES TWICE DAILY

## 2024-04-01 ENCOUNTER — OFFICE VISIT (OUTPATIENT)
Dept: PULMONOLOGY | Age: 69
End: 2024-04-01
Payer: MEDICARE

## 2024-04-01 ENCOUNTER — TELEPHONE (OUTPATIENT)
Dept: PULMONOLOGY | Age: 69
End: 2024-04-01

## 2024-04-01 VITALS
OXYGEN SATURATION: 98 % | SYSTOLIC BLOOD PRESSURE: 126 MMHG | DIASTOLIC BLOOD PRESSURE: 64 MMHG | WEIGHT: 259 LBS | HEIGHT: 68 IN | BODY MASS INDEX: 39.25 KG/M2 | HEART RATE: 85 BPM

## 2024-04-01 DIAGNOSIS — F51.04 PSYCHOPHYSIOLOGICAL INSOMNIA: ICD-10-CM

## 2024-04-01 DIAGNOSIS — E66.9 OBESITY (BMI 30-39.9): ICD-10-CM

## 2024-04-01 DIAGNOSIS — I10 ESSENTIAL HYPERTENSION, BENIGN: ICD-10-CM

## 2024-04-01 DIAGNOSIS — G47.33 SEVERE OBSTRUCTIVE SLEEP APNEA: Primary | ICD-10-CM

## 2024-04-01 DIAGNOSIS — Z72.821 POOR SLEEP HYGIENE: ICD-10-CM

## 2024-04-01 DIAGNOSIS — R53.83 OTHER FATIGUE: ICD-10-CM

## 2024-04-01 DIAGNOSIS — Z71.89 CPAP USE COUNSELING: ICD-10-CM

## 2024-04-01 PROCEDURE — 99214 OFFICE O/P EST MOD 30 MIN: CPT | Performed by: NURSE PRACTITIONER

## 2024-04-01 PROCEDURE — 3074F SYST BP LT 130 MM HG: CPT | Performed by: NURSE PRACTITIONER

## 2024-04-01 PROCEDURE — 1036F TOBACCO NON-USER: CPT | Performed by: NURSE PRACTITIONER

## 2024-04-01 PROCEDURE — 1123F ACP DISCUSS/DSCN MKR DOCD: CPT | Performed by: NURSE PRACTITIONER

## 2024-04-01 PROCEDURE — 3017F COLORECTAL CA SCREEN DOC REV: CPT | Performed by: NURSE PRACTITIONER

## 2024-04-01 PROCEDURE — G8417 CALC BMI ABV UP PARAM F/U: HCPCS | Performed by: NURSE PRACTITIONER

## 2024-04-01 PROCEDURE — G8427 DOCREV CUR MEDS BY ELIG CLIN: HCPCS | Performed by: NURSE PRACTITIONER

## 2024-04-01 PROCEDURE — 3078F DIAST BP <80 MM HG: CPT | Performed by: NURSE PRACTITIONER

## 2024-04-01 RX ORDER — ALLOPURINOL 300 MG/1
300 TABLET ORAL DAILY
COMMUNITY
Start: 2024-03-11

## 2024-04-01 ASSESSMENT — SLEEP AND FATIGUE QUESTIONNAIRES
HOW LIKELY ARE YOU TO NOD OFF OR FALL ASLEEP WHEN YOU ARE A PASSENGER IN A CAR FOR AN HOUR WITHOUT A BREAK: HIGH CHANCE OF DOZING
HOW LIKELY ARE YOU TO NOD OFF OR FALL ASLEEP WHILE SITTING QUIETLY AFTER LUNCH WITHOUT ALCOHOL: HIGH CHANCE OF DOZING
HOW LIKELY ARE YOU TO NOD OFF OR FALL ASLEEP WHILE WATCHING TV: HIGH CHANCE OF DOZING
HOW LIKELY ARE YOU TO NOD OFF OR FALL ASLEEP WHILE LYING DOWN TO REST IN THE AFTERNOON WHEN CIRCUMSTANCES PERMIT: HIGH CHANCE OF DOZING
HOW LIKELY ARE YOU TO NOD OFF OR FALL ASLEEP WHILE SITTING INACTIVE IN A PUBLIC PLACE: SLIGHT CHANCE OF DOZING
NECK CIRCUMFERENCE (INCHES): 21
HOW LIKELY ARE YOU TO NOD OFF OR FALL ASLEEP IN A CAR, WHILE STOPPED FOR A FEW MINUTES IN TRAFFIC: WOULD NEVER DOZE
HOW LIKELY ARE YOU TO NOD OFF OR FALL ASLEEP WHILE SITTING AND TALKING TO SOMEONE: WOULD NEVER DOZE
HOW LIKELY ARE YOU TO NOD OFF OR FALL ASLEEP WHILE SITTING AND READING: SLIGHT CHANCE OF DOZING
ESS TOTAL SCORE: 14

## 2024-04-01 NOTE — PATIENT INSTRUCTIONS
Recommend Go! To Sleep online program with [facility].  Six weeks' worth of effective sleep therapy, online sleep log, daily sleep score, daily sleep improvement recommendations, activities to help get the sleep needed, daily e-mails from program , daily articles to help get the most out of the program, personal progress charts, six specially crafted relaxation practices, and motivational tips.       Here are some tips to to getting better sleep  1- Avoid napping during the day: This will ensure you are tired at bedtime. If you have to take a nap, sleep less than one hour, before 3 pm.   2- Exercise regularly, but not right before bed: but the timing of the workout is important. Exercising in the morning or early afternoon will not interfere with sleep.  Exercising within two hours before bedtime can decrease your ability to fall asleep.  Regular exercise is recommended to help you deepen the sleep.   3- Avoid heavy, spicy, or sugary foods 4-6 hours before bedtime: These can affect your ability to stay asleep.  4- Have a light snack before bed: Having an empty stomach can interfere with your sleep. Dairy products and turkey contain tryptophan, which acts as a natural sleep inducer.   5- Stay away from caffeine, nicotine and alcohol at least 4-6 hours before bed: Caffeine and nicotine are stimulants that interfere with your ability to fall asleep. While alcohol has an immediate sleep-inducing effect, a few hours later, as alcohol levels in your blood start to fall, there is a stimulant effect and you will experience fragmented sleep.   6- Take a hot bath 90 minutes before bedtime:  A hot bath will raise your body temperature, but it is the drop in body temperature that may leave you feeling sleepy  7- Develop sleep rituals: it is important to give your body cues that it is time to slow down and sleep. Listen to relaxing music, read something soothing for 15 minutes, have a cup of caffeine free tea, or do

## 2024-04-01 NOTE — PROGRESS NOTES
day. Occasional alcohol. ESS is 4            Previous HPI 12/12/22  Damian Pandey is a 68 y.o. male in office for AJ follow up.  States he is doing okay with BIPAP.  Patient is using BiPAP 7 hrs/night. Not using humidifier. Occasional snoring on BiPAP. The pressure is well tolerated. The mask is comfortable- nasal mask. No mask leak. +daytime sleepiness. States seeing [facility] for anemia. Also taking norco about 2 times a day for hip pain. Denies nodding off when driving. No dry nose or throat. No fatigue. Bedtime is 1030 pm and rise time is 6 am. Sleep onset is 3 minutes. Wakes up 2-3 times at night total. 2-3 nocturia. It takes usually few minutes to fall back a sleep, states can be longer if thinking about things or if hip is hurting states is getting hip replacement Jan 17. Occasional naps during the day- 10 min. No headache in am. No weight gain. 2 caffienated beverages during the day, states none after 2 pm. Occasional alcohol. ESS is 10- discussed with patient.        Previous HPI 12/6/21  Damian Pandey is a 68 y.o. male in office for AJ follow up.  States he is doing okay with BIPAP.  States pressure feels too low initially- ramp is on and start is set at 4.  Patient is using BiPAP 6-7 hrs/night. Not using humidifier. No snoring on BiPAP.  The mask is comfortable-nasal mask. Minimal mask leak. No significant daytime sleepiness. No nodding off when driving. No dry nose or throat. Some fatigue. Bedtime is 1030 pm and rise time is 6 am. Sleep onset is usually 2 minutes. Wakes up 0-1 times at night total. 0-1 nocturia. It takes usually few minutes to fall back a sleep. Occasional naps during the day. No headache in am. No weight gain. 1-3 caffienated beverages during the day. Occasional alcohol. ESS is 5.            4/1/2024     8:30 AM 4/3/2023     8:23 AM 12/12/2022     8:32 AM 12/12/2022     8:20 AM 12/6/2021     8:20 AM 12/4/2020     8:09 AM 12/2/2019     8:31 AM   Sleep Medicine   Sitting and reading 1

## 2024-04-03 DIAGNOSIS — M62.838 TRAPEZIUS MUSCLE SPASM: ICD-10-CM

## 2024-04-03 RX ORDER — CYCLOBENZAPRINE HCL 10 MG
TABLET ORAL
Qty: 30 TABLET | Refills: 2 | Status: SHIPPED | OUTPATIENT
Start: 2024-04-03

## 2024-04-03 NOTE — TELEPHONE ENCOUNTER
Refill Request     CONFIRM preferrred pharmacy with the patient.    If Mail Order Rx - Pend for 90 day refill.      Last Seen: Last Seen Department: 10/24/2023  Last Seen by PCP: 10/24/2023    Last Written: 10/24/23    If no future appointment scheduled, route STAFF MESSAGE with patient name to the  Pool for scheduling.      Next Appointment:   Future Appointments   Date Time Provider Department Center   4/17/2024  7:30 AM [facility]   4/24/2024  7:20 AM [facility] [place] [facility]   7/1/2024  9:00 AM Emily Beatty APRN - CNP CLERM PULM [facility]   7/29/2024  8:20 AM SCHEDULE, MHCX MT ORAB [facility] [place]       Message sent to  to schedule appt with patient?  N/A      Requested Prescriptions     Pending Prescriptions Disp Refills    cyclobenzaprine (FLEXERIL) 10 MG tablet [Pharmacy Med Name: CYCLOBENZAPRINE HYDROCHLORIDE 10MG TABLET] 30 tablet 2     Sig: TAKE ONE (1) TABLET BY MOUTH NIGHTLY AS NEEDED FOR MUSCLE SPASMS (MUSCLE PAIN)

## 2024-04-15 RX ORDER — MULTIVIT-MIN/IRON/FOLIC ACID/K 18-600-40
CAPSULE ORAL
Qty: 200 TABLET | Refills: 0 | Status: SHIPPED | OUTPATIENT
Start: 2024-04-15

## 2024-04-15 RX ORDER — CHLORAL HYDRATE 500 MG
CAPSULE ORAL
Qty: 300 CAPSULE | Refills: 0 | Status: SHIPPED | OUTPATIENT
Start: 2024-04-15

## 2024-04-21 SDOH — ECONOMIC STABILITY: FOOD INSECURITY: WITHIN THE PAST 12 MONTHS, THE FOOD YOU BOUGHT JUST DIDN'T LAST AND YOU DIDN'T HAVE MONEY TO GET MORE.: NEVER TRUE

## 2024-04-21 SDOH — ECONOMIC STABILITY: FOOD INSECURITY: WITHIN THE PAST 12 MONTHS, YOU WORRIED THAT YOUR FOOD WOULD RUN OUT BEFORE YOU GOT MONEY TO BUY MORE.: NEVER TRUE

## 2024-04-21 SDOH — ECONOMIC STABILITY: TRANSPORTATION INSECURITY
IN THE PAST 12 MONTHS, HAS LACK OF TRANSPORTATION KEPT YOU FROM MEETINGS, WORK, OR FROM GETTING THINGS NEEDED FOR DAILY LIVING?: NO

## 2024-04-21 SDOH — ECONOMIC STABILITY: INCOME INSECURITY: HOW HARD IS IT FOR YOU TO PAY FOR THE VERY BASICS LIKE FOOD, HOUSING, MEDICAL CARE, AND HEATING?: NOT HARD AT ALL

## 2024-04-21 ASSESSMENT — PATIENT HEALTH QUESTIONNAIRE - PHQ9
SUM OF ALL RESPONSES TO PHQ QUESTIONS 1-9: 0
2. FEELING DOWN, DEPRESSED OR HOPELESS: NOT AT ALL
SUM OF ALL RESPONSES TO PHQ9 QUESTIONS 1 & 2: 0
SUM OF ALL RESPONSES TO PHQ9 QUESTIONS 1 & 2: 0
SUM OF ALL RESPONSES TO PHQ QUESTIONS 1-9: 0
SUM OF ALL RESPONSES TO PHQ QUESTIONS 1-9: 0
1. LITTLE INTEREST OR PLEASURE IN DOING THINGS: NOT AT ALL
1. LITTLE INTEREST OR PLEASURE IN DOING THINGS: NOT AT ALL
SUM OF ALL RESPONSES TO PHQ QUESTIONS 1-9: 0
2. FEELING DOWN, DEPRESSED OR HOPELESS: NOT AT ALL

## 2024-04-24 ENCOUNTER — OFFICE VISIT (OUTPATIENT)
Dept: FAMILY MEDICINE CLINIC | Age: 69
End: 2024-04-24

## 2024-04-24 VITALS
SYSTOLIC BLOOD PRESSURE: 137 MMHG | WEIGHT: 255 LBS | DIASTOLIC BLOOD PRESSURE: 77 MMHG | HEART RATE: 60 BPM | OXYGEN SATURATION: 94 % | BODY MASS INDEX: 38.77 KG/M2

## 2024-04-24 DIAGNOSIS — K21.9 GASTROESOPHAGEAL REFLUX DISEASE WITHOUT ESOPHAGITIS: ICD-10-CM

## 2024-04-24 DIAGNOSIS — I73.9 INTERMITTENT CLAUDICATION (HCC): ICD-10-CM

## 2024-04-24 DIAGNOSIS — C61 MALIGNANT NEOPLASM OF PROSTATE (HCC): ICD-10-CM

## 2024-04-24 DIAGNOSIS — E03.9 ACQUIRED HYPOTHYROIDISM: ICD-10-CM

## 2024-04-24 DIAGNOSIS — R79.89 ELEVATED FERRITIN: ICD-10-CM

## 2024-04-24 DIAGNOSIS — M15.9 PRIMARY OSTEOARTHRITIS INVOLVING MULTIPLE JOINTS: ICD-10-CM

## 2024-04-24 DIAGNOSIS — I10 ESSENTIAL HYPERTENSION, BENIGN: Primary | ICD-10-CM

## 2024-04-24 DIAGNOSIS — E78.2 MIXED HYPERLIPIDEMIA: ICD-10-CM

## 2024-04-24 DIAGNOSIS — E66.01 SEVERE OBESITY (BMI 35.0-39.9) WITH COMORBIDITY (HCC): ICD-10-CM

## 2024-04-24 DIAGNOSIS — G47.33 SEVERE OBSTRUCTIVE SLEEP APNEA: ICD-10-CM

## 2024-04-24 LAB
BASOPHILS # BLD: 0 K/UL (ref 0–0.2)
BASOPHILS NFR BLD: 0.8 %
DEPRECATED RDW RBC AUTO: 14.6 % (ref 12.4–15.4)
EOSINOPHIL # BLD: 0.3 K/UL (ref 0–0.6)
EOSINOPHIL NFR BLD: 4.3 %
FERRITIN SERPL IA-MCNC: 583.7 NG/ML (ref 30–400)
HCT VFR BLD AUTO: 34.2 % (ref 40.5–52.5)
HGB BLD-MCNC: 11.9 G/DL (ref 13.5–17.5)
LYMPHOCYTES # BLD: 1.4 K/UL (ref 1–5.1)
LYMPHOCYTES NFR BLD: 23.2 %
MCH RBC QN AUTO: 33 PG (ref 26–34)
MCHC RBC AUTO-ENTMCNC: 34.9 G/DL (ref 31–36)
MCV RBC AUTO: 94.7 FL (ref 80–100)
MONOCYTES # BLD: 0.5 K/UL (ref 0–1.3)
MONOCYTES NFR BLD: 8.8 %
NEUTROPHILS # BLD: 3.8 K/UL (ref 1.7–7.7)
NEUTROPHILS NFR BLD: 62.9 %
PLATELET # BLD AUTO: 211 K/UL (ref 135–450)
PMV BLD AUTO: 9.3 FL (ref 5–10.5)
RBC # BLD AUTO: 3.61 M/UL (ref 4.2–5.9)
WBC # BLD AUTO: 6 K/UL (ref 4–11)

## 2024-04-24 RX ORDER — SIMVASTATIN 40 MG
TABLET ORAL
Qty: 90 TABLET | Refills: 0 | Status: SHIPPED | OUTPATIENT
Start: 2024-04-24

## 2024-04-24 RX ORDER — LEVOTHYROXINE SODIUM 0.03 MG/1
25 TABLET ORAL DAILY
Qty: 90 TABLET | Refills: 0 | Status: SHIPPED | OUTPATIENT
Start: 2024-04-24

## 2024-04-24 RX ORDER — DICLOFENAC SODIUM 75 MG/1
TABLET, DELAYED RELEASE ORAL
Qty: 180 TABLET | Refills: 0 | Status: SHIPPED | OUTPATIENT
Start: 2024-04-24

## 2024-04-24 RX ORDER — OMEPRAZOLE 40 MG/1
CAPSULE, DELAYED RELEASE ORAL
Qty: 90 CAPSULE | Refills: 0 | Status: SHIPPED | OUTPATIENT
Start: 2024-04-24

## 2024-04-24 RX ORDER — ENALAPRIL MALEATE 20 MG/1
TABLET ORAL
Qty: 180 TABLET | Refills: 0 | Status: SHIPPED | OUTPATIENT
Start: 2024-04-24

## 2024-04-24 SDOH — ECONOMIC STABILITY: FOOD INSECURITY: WITHIN THE PAST 12 MONTHS, THE FOOD YOU BOUGHT JUST DIDN'T LAST AND YOU DIDN'T HAVE MONEY TO GET MORE.: NEVER TRUE

## 2024-04-24 SDOH — ECONOMIC STABILITY: INCOME INSECURITY: HOW HARD IS IT FOR YOU TO PAY FOR THE VERY BASICS LIKE FOOD, HOUSING, MEDICAL CARE, AND HEATING?: NOT HARD AT ALL

## 2024-04-24 SDOH — ECONOMIC STABILITY: FOOD INSECURITY: WITHIN THE PAST 12 MONTHS, YOU WORRIED THAT YOUR FOOD WOULD RUN OUT BEFORE YOU GOT MONEY TO BUY MORE.: NEVER TRUE

## 2024-04-24 ASSESSMENT — PATIENT HEALTH QUESTIONNAIRE - PHQ9
SUM OF ALL RESPONSES TO PHQ QUESTIONS 1-9: 0
2. FEELING DOWN, DEPRESSED OR HOPELESS: NOT AT ALL
SUM OF ALL RESPONSES TO PHQ QUESTIONS 1-9: 0
SUM OF ALL RESPONSES TO PHQ9 QUESTIONS 1 & 2: 0
SUM OF ALL RESPONSES TO PHQ QUESTIONS 1-9: 0
1. LITTLE INTEREST OR PLEASURE IN DOING THINGS: NOT AT ALL
SUM OF ALL RESPONSES TO PHQ QUESTIONS 1-9: 0

## 2024-04-24 ASSESSMENT — LIFESTYLE VARIABLES
HOW OFTEN DURING THE LAST YEAR HAVE YOU NEEDED AN ALCOHOLIC DRINK FIRST THING IN THE MORNING TO GET YOURSELF GOING AFTER A NIGHT OF HEAVY DRINKING: NEVER
HOW OFTEN DURING THE LAST YEAR HAVE YOU BEEN UNABLE TO REMEMBER WHAT HAPPENED THE NIGHT BEFORE BECAUSE YOU HAD BEEN DRINKING: NEVER
HOW OFTEN DO YOU HAVE A DRINK CONTAINING ALCOHOL: 2-3 TIMES A WEEK
HOW OFTEN DURING THE LAST YEAR HAVE YOU FOUND THAT YOU WERE NOT ABLE TO STOP DRINKING ONCE YOU HAD STARTED: NEVER
HOW OFTEN DURING THE LAST YEAR HAVE YOU FAILED TO DO WHAT WAS NORMALLY EXPECTED FROM YOU BECAUSE OF DRINKING: NEVER
HOW MANY STANDARD DRINKS CONTAINING ALCOHOL DO YOU HAVE ON A TYPICAL DAY: 1 OR 2
HAS A RELATIVE, FRIEND, DOCTOR, OR ANOTHER HEALTH PROFESSIONAL EXPRESSED CONCERN ABOUT YOUR DRINKING OR SUGGESTED YOU CUT DOWN: NO
HAVE YOU OR SOMEONE ELSE BEEN INJURED AS A RESULT OF YOUR DRINKING: NO
HOW OFTEN DURING THE LAST YEAR HAVE YOU HAD A FEELING OF GUILT OR REMORSE AFTER DRINKING: NEVER

## 2024-04-24 NOTE — PATIENT INSTRUCTIONS
STOP niacin    Begin Simvastatin 40mg daily    Try taking 1-2 tabs of benadryl nightly to help with sleep and stop the itching         Not feeling your best?  Where to go for the right care at the right time.    Dear Damian Pandey   I wanted to provide you with some information that might help you seek care for your condition when your primary care provider or specialist is unavailable. If you have a need outside of normal business hours, you should first contact your primary care office or specialist caring for your condition. They may have on-call providers that could assist with your care. During office hours, you may request a virtual or same day appointment.   But what if your primary care provider is not in the office that day and you can't wait until the  next day for care? In that situation, your next option is to visit an urgent care facility.          [facility] now has urgent care sites open to support our community.   [facility] is a great alternative when you need immediate medical care that is not a serious threat to your health or your doctor's office is closed or unable to get you in for an appointment. The urgent care centers offer fast access to [facility] doctors for minor illnesses and injuries for patients of all ages. There are other medical services available including lab testing, X-rays, EKGs, and IV fluids.  Locations are open daily from 8 a.m. - 8 p.m.     [facility]  [address], [place], [place] [address]  [phone]    [address] [place], [place], [place] [address]  [phone]    Local Urgent Care     [facility] 8:30 am - 7:70 pm   [address] [place] [address]  [phone]    [facility]     8 am - 8 pm   [address], [place] [address]  [phone]    [facility] [address] 7 am - 7:30 pm  [address], [place]

## 2024-04-26 DIAGNOSIS — R79.89 ELEVATED FERRITIN LEVEL: Primary | ICD-10-CM

## 2024-05-09 RX ORDER — CHLORAL HYDRATE 500 MG
CAPSULE ORAL
Qty: 300 CAPSULE | Refills: 0 | Status: SHIPPED | OUTPATIENT
Start: 2024-05-09

## 2024-05-09 NOTE — TELEPHONE ENCOUNTER
Refill Request     CONFIRM preferrred pharmacy with the patient.    If Mail Order Rx - Pend for 90 day refill.      Last Seen: Last Seen Department: 4/24/2024  Last Seen by PCP: 4/24/2024    Last Written: 4-    If no future appointment scheduled, route STAFF MESSAGE with patient name to the  Pool for scheduling.      Next Appointment:   Future Appointments   Date Time Provider Department [place]   7/1/2024  9:00 AM Beatty, Emily, APRN - CNP CLERM PULM [facility]   7/29/2024  8:20 AM [facility] [place] [facility]   10/2/2024  1:00 PM [facility] [place] [facility]       Message sent to  to schedule appt with patient?  N/A      Requested Prescriptions     Pending Prescriptions Disp Refills    Omega-3 Fatty Acids (OMEGA-3 FISH OIL) 1000 MG CAPS [Pharmacy Med Name: OMEGA-3 FISH OIL 1000MG CAPSULE] 300 capsule 0     Sig: TAKE SIX (6) CAPSULES TWICE DAILY

## 2024-05-10 ENCOUNTER — TELEPHONE (OUTPATIENT)
Dept: PULMONOLOGY | Age: 69
End: 2024-05-10

## 2024-05-10 NOTE — TELEPHONE ENCOUNTER
Called patient and informed him that orders were sent over on 4/1/2024    He spoke with them yesterday and they didn't have orders    Can you resent orders

## 2024-05-10 NOTE — TELEPHONE ENCOUNTER
Refaxed chin strap order, pressure change order, nasal mask order, CR, and ov notes to [facility] at [phone] via [employer].

## 2024-05-10 NOTE — TELEPHONE ENCOUNTER
Pt called and stated that he needs PAP supplies. Pt is requesting that an order be sent to [facility] in [place]. Please advise.

## 2024-05-24 ENCOUNTER — HOSPITAL ENCOUNTER (OUTPATIENT)
Dept: CT IMAGING | Age: 69
Discharge: HOME | End: 2024-05-24
Payer: MEDICARE

## 2024-05-24 DIAGNOSIS — R91.1 LUNG NODULE: ICD-10-CM

## 2024-05-24 PROCEDURE — 71250 CT THORAX DX C-: CPT

## 2024-06-03 RX ORDER — SULFAMETHOXAZOLE AND TRIMETHOPRIM 800; 160 MG/1; MG/1
1 TABLET ORAL 2 TIMES DAILY
Qty: 6 TABLET | Refills: 2 | Status: SHIPPED | OUTPATIENT
Start: 2024-06-03 | End: 2024-06-06

## 2024-06-03 RX ORDER — CHLORAL HYDRATE 500 MG
CAPSULE ORAL
Qty: 360 CAPSULE | Refills: 0 | Status: SHIPPED | OUTPATIENT
Start: 2024-06-03

## 2024-06-03 NOTE — TELEPHONE ENCOUNTER
Refill Request     CONFIRM preferrred pharmacy with the patient.    If Mail Order Rx - Pend for 90 day refill.      Last Seen: Last Seen Department: 4/24/2024  Last Seen by PCP: 4/24/2024    Last Written: 5/9/24    If no future appointment scheduled, route STAFF MESSAGE with patient name to the  Pool for scheduling.      Next Appointment:   Future Appointments   Date Time Provider Department Center   7/1/2024  9:00 AM Emily Beatty APRN - CNP CLERM PULM [facility]   7/29/2024  8:20 [facility] [place] [facility]   10/2/2024  1:00 PM [facility] [place] [facility]       Message sent to  to schedule appt with patient?  N/A      Requested Prescriptions     Pending Prescriptions Disp Refills    Omega-3 Fatty Acids (OMEGA-3 FISH OIL) 1000 MG CAPS [Pharmacy Med Name: OMEGA-3 FISH OIL 1000MG CAPSULE] 300 capsule 0     Sig: TAKE SIX (6) CAPSULES TWICE DAILY       
17-Nov-2022 18:19

## 2024-07-01 ENCOUNTER — OFFICE VISIT (OUTPATIENT)
Dept: PULMONOLOGY | Age: 69
End: 2024-07-01
Payer: MEDICARE

## 2024-07-01 VITALS
DIASTOLIC BLOOD PRESSURE: 74 MMHG | BODY MASS INDEX: 39.07 KG/M2 | OXYGEN SATURATION: 97 % | SYSTOLIC BLOOD PRESSURE: 142 MMHG | WEIGHT: 257.8 LBS | HEART RATE: 63 BPM | HEIGHT: 68 IN

## 2024-07-01 DIAGNOSIS — R53.83 OTHER FATIGUE: ICD-10-CM

## 2024-07-01 DIAGNOSIS — F51.04 PSYCHOPHYSIOLOGICAL INSOMNIA: ICD-10-CM

## 2024-07-01 DIAGNOSIS — G47.33 SEVERE OBSTRUCTIVE SLEEP APNEA: Primary | ICD-10-CM

## 2024-07-01 DIAGNOSIS — I10 ESSENTIAL HYPERTENSION, BENIGN: ICD-10-CM

## 2024-07-01 DIAGNOSIS — Z71.89 ENCOUNTER FOR BIPAP USE COUNSELING: ICD-10-CM

## 2024-07-01 DIAGNOSIS — E66.9 OBESITY (BMI 30-39.9): ICD-10-CM

## 2024-07-01 PROCEDURE — 1123F ACP DISCUSS/DSCN MKR DOCD: CPT | Performed by: NURSE PRACTITIONER

## 2024-07-01 PROCEDURE — G8417 CALC BMI ABV UP PARAM F/U: HCPCS | Performed by: NURSE PRACTITIONER

## 2024-07-01 PROCEDURE — 3077F SYST BP >= 140 MM HG: CPT | Performed by: NURSE PRACTITIONER

## 2024-07-01 PROCEDURE — G8427 DOCREV CUR MEDS BY ELIG CLIN: HCPCS | Performed by: NURSE PRACTITIONER

## 2024-07-01 PROCEDURE — 3078F DIAST BP <80 MM HG: CPT | Performed by: NURSE PRACTITIONER

## 2024-07-01 PROCEDURE — 3017F COLORECTAL CA SCREEN DOC REV: CPT | Performed by: NURSE PRACTITIONER

## 2024-07-01 PROCEDURE — 1036F TOBACCO NON-USER: CPT | Performed by: NURSE PRACTITIONER

## 2024-07-01 PROCEDURE — 99214 OFFICE O/P EST MOD 30 MIN: CPT | Performed by: NURSE PRACTITIONER

## 2024-07-01 RX ORDER — CHLORAL HYDRATE 500 MG
CAPSULE ORAL
Qty: 300 CAPSULE | Refills: 0 | Status: SHIPPED | OUTPATIENT
Start: 2024-07-01

## 2024-07-01 ASSESSMENT — SLEEP AND FATIGUE QUESTIONNAIRES
HOW LIKELY ARE YOU TO NOD OFF OR FALL ASLEEP WHILE SITTING AND READING: SLIGHT CHANCE OF DOZING
HOW LIKELY ARE YOU TO NOD OFF OR FALL ASLEEP WHILE LYING DOWN TO REST IN THE AFTERNOON WHEN CIRCUMSTANCES PERMIT: WOULD NEVER DOZE
HOW LIKELY ARE YOU TO NOD OFF OR FALL ASLEEP WHEN YOU ARE A PASSENGER IN A CAR FOR AN HOUR WITHOUT A BREAK: MODERATE CHANCE OF DOZING
HOW LIKELY ARE YOU TO NOD OFF OR FALL ASLEEP WHILE WATCHING TV: SLIGHT CHANCE OF DOZING
HOW LIKELY ARE YOU TO NOD OFF OR FALL ASLEEP WHILE SITTING INACTIVE IN A PUBLIC PLACE: MODERATE CHANCE OF DOZING
ESS TOTAL SCORE: 7
HOW LIKELY ARE YOU TO NOD OFF OR FALL ASLEEP WHILE SITTING QUIETLY AFTER LUNCH WITHOUT ALCOHOL: SLIGHT CHANCE OF DOZING
NECK CIRCUMFERENCE (INCHES): 20
HOW LIKELY ARE YOU TO NOD OFF OR FALL ASLEEP WHILE SITTING AND TALKING TO SOMEONE: WOULD NEVER DOZE
HOW LIKELY ARE YOU TO NOD OFF OR FALL ASLEEP IN A CAR, WHILE STOPPED FOR A FEW MINUTES IN TRAFFIC: WOULD NEVER DOZE

## 2024-07-01 NOTE — PATIENT INSTRUCTIONS
something boring until you feel sleepy. Sit quietly in the dark or read the warranty on your refrigerator. Don't expose yourself to bright light while you are up, it gives cues to your brain that it is time to wake up.  11- Only use your bed for sleeping: Don’t use the bed as an office, workroom or recreation room. Let your body \"know\" that the bed is associated with sleeping  12- Use comfortable bedding. Uncomfortable bedding can prevent good sleep. Evaluate whether or not this is a source of your problem, and make appropriate changes.  13- Make sure your bed and bedroom are quiet and comfortable: A hot room can be uncomfortable. A cooler room, along with enough blankets to stay warm is recommended. Get a blackout shade or wear a slumber mask and wear earplugs or get a \"white noise\" machine for light and noise distractions.   14- Use sunlight to set your biological clock: When you get up in the morning, go outside and turn your face to the sun for 15 minutes.  15- Don’t take your worries to bed: Leave worries about job, school, daily life, etc., behind when you go to bed. Some people find it useful to assign a \"worry period\" during the evening or afternoon for these issues.    Increase exercise, f/u with PCP

## 2024-07-01 NOTE — PROGRESS NOTES
Fatigue-comorbid conditions, pain likely contributing  Obesity  Sleep maintenance insomnia-psychophysiological hyperarousal, pain likely contributing-improving  HTN    Plan:       -Continue BIPAP 21/17 cm H2O  -Interpreted and reviewed BiPAP download data with patient  - Follow up with PCP for other potential causes of fatigue, thyroid.  -Supply orders  - Patient to call DME if he would like to try different mask.  - Advised to use BiPAP 6-8 hrs at night and during naps.  - Replacement of mask, tubing, head straps every 3-6 months or sooner if damaged.   - Patient instructed to contact [employer] company for any mask, tubing or machine trouble shooting if problems arise.  - Sleep hygiene  Cognitive behavioral therapy was discussed with patient including stimulus control and sleep restriction   -Recommend Go! To Sleep online program with [facility].  Six weeks' worth of effective sleep therapy, online sleep log, daily sleep score, daily sleep improvement recommendations, activities to help get the sleep needed, daily e-mails from program , daily articles to help get the most out of the program, personal progress charts, six specially crafted relaxation practices, and motivational tips.   -Could consider trial of trazodone if no improvement  -Continue to work with PCP/specialist for pain/itching as this is likely contributing to poor sleep  -Increase activity in the daytime  - Avoid sedatives, alcohol and caffeinated drinks at bed time.  - Patient counseled to never drive or operate heavy machinery while fatigue, drowsy or sleepy- patient verbalized understanding and agrees.   - Weight loss is recommended as a long-term intervention.    - Complications of AJ if not treated were discussed with patient patient, including: systemic hypertension, pulmonary hypertension, cardiovascular morbidities, car accidents and all cause mortality.  -Patient education provided regarding sleep tips and PAP cleaning

## 2024-07-25 DIAGNOSIS — E03.9 ACQUIRED HYPOTHYROIDISM: ICD-10-CM

## 2024-07-25 RX ORDER — CHLORAL HYDRATE 500 MG
CAPSULE ORAL
Qty: 300 CAPSULE | Refills: 0 | Status: SHIPPED | OUTPATIENT
Start: 2024-07-25

## 2024-07-25 RX ORDER — LEVOTHYROXINE SODIUM 0.03 MG/1
25 TABLET ORAL DAILY
Qty: 90 TABLET | Refills: 0 | Status: SHIPPED | OUTPATIENT
Start: 2024-07-25

## 2024-07-25 NOTE — TELEPHONE ENCOUNTER
Refill Request     CONFIRM preferrred pharmacy with the patient.    If Mail Order Rx - Pend for 90 day refill.      Last Seen: Last Seen Department: 4/24/2024  Last Seen by PCP: 4/24/2024    Last Written: 7/1/24    If no future appointment scheduled, route STAFF MESSAGE with patient name to the  Pool for scheduling.      Next Appointment:   Future Appointments   Date Time Provider Department Center   7/29/2024  8:20 AM [facility]   10/2/2024  1:00 PM [facility] [place] [facility]   6/30/2025  9:00 AM Emily Beatty APRN - CNP CLERM PULM [facility]       Message sent to  to schedule appt with patient?  N/A      Requested Prescriptions     Pending Prescriptions Disp Refills    Omega-3 Fatty Acids (OMEGA-3 FISH OIL) 1000 MG CAPS [Pharmacy Med Name: OMEGA-3 FISH OIL 1000MG CAPSULE] 300 capsule 0     Sig: TAKE SIX (6) CAPSULES TWICE DAILY    levothyroxine (SYNTHROID) 25 MCG tablet [Pharmacy Med Name: LEVOTHYROXINE SODIUM 25MCG TABLET] 90 tablet 0     Sig: TAKE ONE (1) TABLET BY MOUTH DAILY       
no

## 2024-07-29 ENCOUNTER — NURSE ONLY (OUTPATIENT)
Dept: FAMILY MEDICINE CLINIC | Age: 69
End: 2024-07-29
Payer: MEDICARE

## 2024-07-29 DIAGNOSIS — Z13.0 SCREENING FOR DISORDER OF BLOOD AND BLOOD-FORMING ORGANS: Primary | ICD-10-CM

## 2024-07-29 LAB
BASOPHILS # BLD: 0 K/UL (ref 0–0.2)
BASOPHILS NFR BLD: 0.8 %
DEPRECATED RDW RBC AUTO: 14.7 % (ref 12.4–15.4)
EOSINOPHIL # BLD: 0.2 K/UL (ref 0–0.6)
EOSINOPHIL NFR BLD: 3.2 %
HCT VFR BLD AUTO: 36.1 % (ref 40.5–52.5)
HGB BLD-MCNC: 12.2 G/DL (ref 13.5–17.5)
LYMPHOCYTES # BLD: 1.1 K/UL (ref 1–5.1)
LYMPHOCYTES NFR BLD: 18.8 %
MCH RBC QN AUTO: 32.3 PG (ref 26–34)
MCHC RBC AUTO-ENTMCNC: 33.8 G/DL (ref 31–36)
MCV RBC AUTO: 95.7 FL (ref 80–100)
MONOCYTES # BLD: 0.3 K/UL (ref 0–1.3)
MONOCYTES NFR BLD: 5.8 %
NEUTROPHILS # BLD: 4.2 K/UL (ref 1.7–7.7)
NEUTROPHILS NFR BLD: 71.4 %
PLATELET # BLD AUTO: 207 K/UL (ref 135–450)
PMV BLD AUTO: 10.8 FL (ref 5–10.5)
RBC # BLD AUTO: 3.78 M/UL (ref 4.2–5.9)
WBC # BLD AUTO: 5.9 K/UL (ref 4–11)

## 2024-07-29 PROCEDURE — 36415 COLL VENOUS BLD VENIPUNCTURE: CPT | Performed by: FAMILY MEDICINE

## 2024-07-30 DIAGNOSIS — Z13.0 SCREENING FOR DISORDER OF BLOOD AND BLOOD-FORMING ORGANS: Primary | ICD-10-CM

## 2024-08-03 DIAGNOSIS — E78.2 MIXED HYPERLIPIDEMIA: ICD-10-CM

## 2024-08-05 RX ORDER — OMEPRAZOLE 40 MG/1
CAPSULE, DELAYED RELEASE ORAL
Qty: 90 CAPSULE | Refills: 0 | Status: SHIPPED | OUTPATIENT
Start: 2024-08-05

## 2024-08-05 RX ORDER — SIMVASTATIN 40 MG
TABLET ORAL
Qty: 90 TABLET | Refills: 0 | Status: SHIPPED | OUTPATIENT
Start: 2024-08-05

## 2024-08-05 RX ORDER — MULTIVIT-MIN/IRON/FOLIC ACID/K 18-600-40
CAPSULE ORAL
Qty: 200 TABLET | Refills: 0 | Status: SHIPPED | OUTPATIENT
Start: 2024-08-05

## 2024-08-05 RX ORDER — DICLOFENAC SODIUM 75 MG/1
TABLET, DELAYED RELEASE ORAL
Qty: 180 TABLET | Refills: 0 | Status: SHIPPED | OUTPATIENT
Start: 2024-08-05

## 2024-08-27 RX ORDER — CHLORAL HYDRATE 500 MG
CAPSULE ORAL
Qty: 300 CAPSULE | Refills: 0 | Status: SHIPPED | OUTPATIENT
Start: 2024-08-27

## 2024-08-27 NOTE — TELEPHONE ENCOUNTER
Refill Request     CONFIRM preferrred pharmacy with the patient.    If Mail Order Rx - Pend for 90 day refill.      Last Seen: Last Seen Department: 4/24/2024  Last Seen by PCP: 4/24/2024    Last Written: 7-    If no future appointment scheduled, route STAFF MESSAGE with patient name to the  Pool for scheduling.      Next Appointment:   Future Appointments   Date Time Provider Department Center   10/2/2024  1:00 PM [facility]   6/30/2025  9:00 AM Emily eBatty APRN - CNP CLERM PULM [facility]       Message sent to  to schedule appt with patient?  N/A      Requested Prescriptions     Pending Prescriptions Disp Refills    Omega-3 Fatty Acids (OMEGA-3 FISH OIL) 1000 MG CAPS [Pharmacy Med Name: OMEGA-3 FISH OIL 1000MG CAPSULE] 300 capsule 0     Sig: TAKE SIX (6) CAPSULES TWICE DAILY

## 2024-09-04 ENCOUNTER — HOSPITAL ENCOUNTER (OUTPATIENT)
Age: 69
Discharge: HOME | End: 2024-09-04
Payer: MEDICARE

## 2024-09-04 PROCEDURE — 84153 ASSAY OF PSA TOTAL: CPT

## 2024-09-04 PROCEDURE — 36415 COLL VENOUS BLD VENIPUNCTURE: CPT

## 2024-09-05 LAB — PSA SERPL DL<=0.01 NG/ML-MCNC: <0.02 NG/ML (ref 0–4)

## 2024-09-10 DIAGNOSIS — M62.838 TRAPEZIUS MUSCLE SPASM: ICD-10-CM

## 2024-09-10 RX ORDER — CYCLOBENZAPRINE HCL 10 MG
TABLET ORAL
Qty: 30 TABLET | Refills: 2 | Status: SHIPPED | OUTPATIENT
Start: 2024-09-10

## 2024-09-25 RX ORDER — ENALAPRIL MALEATE 20 MG/1
TABLET ORAL
Qty: 180 TABLET | Refills: 0 | Status: SHIPPED | OUTPATIENT
Start: 2024-09-25

## 2024-09-25 RX ORDER — CHLORAL HYDRATE 500 MG
CAPSULE ORAL
Qty: 300 CAPSULE | Refills: 0 | Status: SHIPPED | OUTPATIENT
Start: 2024-09-25

## 2024-10-02 ENCOUNTER — OFFICE VISIT (OUTPATIENT)
Dept: FAMILY MEDICINE CLINIC | Age: 69
End: 2024-10-02

## 2024-10-02 VITALS
DIASTOLIC BLOOD PRESSURE: 72 MMHG | WEIGHT: 263 LBS | HEART RATE: 77 BPM | OXYGEN SATURATION: 92 % | SYSTOLIC BLOOD PRESSURE: 131 MMHG | BODY MASS INDEX: 39.99 KG/M2

## 2024-10-02 DIAGNOSIS — K21.9 GASTROESOPHAGEAL REFLUX DISEASE WITHOUT ESOPHAGITIS: ICD-10-CM

## 2024-10-02 DIAGNOSIS — M15.0 PRIMARY OSTEOARTHRITIS INVOLVING MULTIPLE JOINTS: ICD-10-CM

## 2024-10-02 DIAGNOSIS — E03.9 HYPOTHYROIDISM, UNSPECIFIED TYPE: ICD-10-CM

## 2024-10-02 DIAGNOSIS — R21 SKIN RASH: ICD-10-CM

## 2024-10-02 DIAGNOSIS — Z23 NEEDS FLU SHOT: ICD-10-CM

## 2024-10-02 DIAGNOSIS — E78.2 MIXED HYPERLIPIDEMIA: ICD-10-CM

## 2024-10-02 DIAGNOSIS — G47.33 SEVERE OBSTRUCTIVE SLEEP APNEA: ICD-10-CM

## 2024-10-02 DIAGNOSIS — I10 ESSENTIAL HYPERTENSION, BENIGN: Primary | ICD-10-CM

## 2024-10-02 RX ORDER — HYDROCORTISONE 2.5 %
CREAM (GRAM) TOPICAL
Qty: 28 G | Refills: 0 | Status: SHIPPED | OUTPATIENT
Start: 2024-10-02

## 2024-10-02 NOTE — PROGRESS NOTES
content normal.         Judgment: Judgment normal.              ASSESSMENT PLAN   Date of last Colonoscopy: 8/15/2018   No cologuard on file  No FIT/FOBT on file   No flexible sigmoidoscopy on file  No breast cancer screening on file  No cervical cancer screening on file     Diagnosis Orders   1. Essential hypertension, benign  CBC with Auto Differential    Comprehensive Metabolic Panel      2. Mixed hyperlipidemia  LIPID PANEL      3. Hypothyroidism, unspecified type  TSH with Reflex    T4, Free      4. Skin rash  hydrocortisone 2.5 % cream      5. Gastroesophageal reflux disease without esophagitis        6. Primary osteoarthritis involving multiple joints        7. Severe obstructive sleep apnea        8. Needs flu shot  Influenza, FLUAD Trivalent, (age 65 y+), IM, Preservative Free, 0.5mL      Current blood pressure readings in the office or at home are acceptable and current antihypertensive medications as listed in the medication list require no change.  Lipids will be monitored based upon levels requiring treatment and other cardiac risks.  Medications for hyperlipidemia and hypertriglyceridemia as listed on the medication list will be changed as necessary to reach control parameters.  Thyroid replacement by lab parameters and symptoms appears appropriate and no changes in thyroid medication as listed in the medication profile is necessary reflux symptoms based upon patient's history is controlled and no changes in medications for reflux as listed in the medication profile is necessary.  The rash at the posterior scalp line and in the groin area is exacerbated by heat which I told him would likely occur.  The area on the scalp may be related in part to the straps holding his CPAP.  Do hydrocortisone 2.5% cream twice a day in the groin and on the scalp for a week then may still have to use it as needed as I told him this kind of rash is more often treated rather than cured.  Arthritis is stable.  Accepted a

## 2024-10-03 ENCOUNTER — NURSE ONLY (OUTPATIENT)
Dept: FAMILY MEDICINE CLINIC | Age: 69
End: 2024-10-03

## 2024-10-03 DIAGNOSIS — E03.9 HYPOTHYROIDISM, UNSPECIFIED TYPE: ICD-10-CM

## 2024-10-03 DIAGNOSIS — I10 ESSENTIAL HYPERTENSION, BENIGN: ICD-10-CM

## 2024-10-03 DIAGNOSIS — E78.2 MIXED HYPERLIPIDEMIA: ICD-10-CM

## 2024-10-03 LAB
ALBUMIN SERPL-MCNC: 4.7 G/DL (ref 3.4–5)
ALBUMIN/GLOB SERPL: 2.1 {RATIO} (ref 1.1–2.2)
ALP SERPL-CCNC: 87 U/L (ref 40–129)
ALT SERPL-CCNC: 42 U/L (ref 10–40)
ANION GAP SERPL CALCULATED.3IONS-SCNC: 11 MMOL/L (ref 3–16)
AST SERPL-CCNC: 36 U/L (ref 15–37)
BASOPHILS # BLD: 0 K/UL (ref 0–0.2)
BASOPHILS NFR BLD: 0.5 %
BILIRUB SERPL-MCNC: 0.5 MG/DL (ref 0–1)
BUN SERPL-MCNC: 24 MG/DL (ref 7–20)
CALCIUM SERPL-MCNC: 9.8 MG/DL (ref 8.3–10.6)
CHLORIDE SERPL-SCNC: 102 MMOL/L (ref 99–110)
CHOLEST SERPL-MCNC: 142 MG/DL (ref 0–199)
CO2 SERPL-SCNC: 27 MMOL/L (ref 21–32)
CREAT SERPL-MCNC: 1.4 MG/DL (ref 0.8–1.3)
DEPRECATED RDW RBC AUTO: 14.5 % (ref 12.4–15.4)
EOSINOPHIL # BLD: 0.2 K/UL (ref 0–0.6)
EOSINOPHIL NFR BLD: 3.9 %
GFR SERPLBLD CREATININE-BSD FMLA CKD-EPI: 54 ML/MIN/{1.73_M2}
GLUCOSE SERPL-MCNC: 102 MG/DL (ref 70–99)
HCT VFR BLD AUTO: 36.4 % (ref 40.5–52.5)
HDLC SERPL-MCNC: 28 MG/DL (ref 40–60)
HGB BLD-MCNC: 12.2 G/DL (ref 13.5–17.5)
LDLC SERPL CALC-MCNC: 72 MG/DL
LYMPHOCYTES # BLD: 1.1 K/UL (ref 1–5.1)
LYMPHOCYTES NFR BLD: 18.1 %
MCH RBC QN AUTO: 32 PG (ref 26–34)
MCHC RBC AUTO-ENTMCNC: 33.6 G/DL (ref 31–36)
MCV RBC AUTO: 95.4 FL (ref 80–100)
MONOCYTES # BLD: 0.5 K/UL (ref 0–1.3)
MONOCYTES NFR BLD: 8.2 %
NEUTROPHILS # BLD: 4 K/UL (ref 1.7–7.7)
NEUTROPHILS NFR BLD: 69.3 %
PLATELET # BLD AUTO: 195 K/UL (ref 135–450)
PMV BLD AUTO: 10 FL (ref 5–10.5)
POTASSIUM SERPL-SCNC: 4.8 MMOL/L (ref 3.5–5.1)
PROT SERPL-MCNC: 6.9 G/DL (ref 6.4–8.2)
RBC # BLD AUTO: 3.82 M/UL (ref 4.2–5.9)
SODIUM SERPL-SCNC: 140 MMOL/L (ref 136–145)
T4 FREE SERPL-MCNC: 1.5 NG/DL (ref 0.9–1.8)
TRIGL SERPL-MCNC: 212 MG/DL (ref 0–150)
TSH SERPL DL<=0.005 MIU/L-ACNC: 9.33 UIU/ML (ref 0.27–4.2)
VLDLC SERPL CALC-MCNC: 42 MG/DL
WBC # BLD AUTO: 5.8 K/UL (ref 4–11)

## 2024-10-07 DIAGNOSIS — N28.9 DECREASED RENAL FUNCTION: ICD-10-CM

## 2024-10-07 DIAGNOSIS — E78.2 MIXED HYPERLIPIDEMIA: ICD-10-CM

## 2024-10-07 DIAGNOSIS — D64.9 ANEMIA, UNSPECIFIED TYPE: ICD-10-CM

## 2024-10-07 DIAGNOSIS — E03.9 ACQUIRED HYPOTHYROIDISM: Primary | ICD-10-CM

## 2024-10-07 RX ORDER — LEVOTHYROXINE SODIUM 50 UG/1
50 TABLET ORAL DAILY
Qty: 90 TABLET | Refills: 0 | Status: SHIPPED | OUTPATIENT
Start: 2024-10-07

## 2024-10-07 RX ORDER — ROSUVASTATIN CALCIUM 20 MG/1
20 TABLET, COATED ORAL DAILY
Qty: 90 TABLET | Refills: 0 | Status: SHIPPED | OUTPATIENT
Start: 2024-10-07

## 2024-11-04 ENCOUNTER — TELEPHONE (OUTPATIENT)
Dept: FAMILY MEDICINE CLINIC | Age: 69
End: 2024-11-04

## 2024-11-04 NOTE — TELEPHONE ENCOUNTER
Spoke with patient this morning related to AWV. Wellness visit was done 4/2024 with a Dr Ingram visit

## 2024-11-06 RX ORDER — MAGNESIUM GLUCONATE 27 MG(500)
TABLET ORAL
Qty: 300 CAPSULE | Refills: 0 | Status: SHIPPED | OUTPATIENT
Start: 2024-11-06

## 2024-11-26 DIAGNOSIS — K21.9 GASTROESOPHAGEAL REFLUX DISEASE WITHOUT ESOPHAGITIS: Primary | ICD-10-CM

## 2024-11-26 RX ORDER — OMEPRAZOLE 40 MG/1
CAPSULE, DELAYED RELEASE ORAL
Qty: 90 CAPSULE | Refills: 0 | Status: SHIPPED | OUTPATIENT
Start: 2024-11-26

## 2024-11-26 RX ORDER — MULTIVIT-MIN/IRON/FOLIC ACID/K 18-600-40
CAPSULE ORAL
Qty: 200 TABLET | Refills: 0 | Status: SHIPPED | OUTPATIENT
Start: 2024-11-26

## 2024-11-26 NOTE — TELEPHONE ENCOUNTER
Refill Request     CONFIRM preferrred pharmacy with the patient.    If Mail Order Rx - Pend for 90 day refill.      Last Seen: Last Seen Department: 10/2/2024  Last Seen by PCP: 10/2/2024    Last Written: 8/5/24    If no future appointment scheduled, route STAFF MESSAGE with patient name to the  Pool for scheduling.      Next Appointment:   Future Appointments   Date Time Provider Department Center   12/10/2024  9:00 [facility]   1/9/2025  9:15 AM Larry Leach MD AND ORTHO MMA   4/8/2025  9:10 AM Bijal Mckinley MD [facility] DEP   6/30/2025  9:00 AM Beatty, Emily, APRN - CNP CLERM PULM [facility]       Message sent to  to schedule appt with patient?  N/A      Requested Prescriptions     Pending Prescriptions Disp Refills    omeprazole (PRILOSEC) 40 MG delayed release capsule [Pharmacy Med Name: OMEPRAZOLE 40MG CAPSULE DR] 90 capsule 0     Sig: TAKE ONE (1) CAPSULE BY MOUTH EVERY MORNING BEFORE BREAKFAST    D3-1000 25 MCG (1000 UT) TABS tablet [Pharmacy Med Name: D3-1000 1000UNIT TABLET] 200 tablet 0     Sig: TAKE TWO (2) TABLETS BY MOUTH DAILY

## 2024-12-02 RX ORDER — CHLORAL HYDRATE 500 MG
CAPSULE ORAL
Qty: 300 CAPSULE | Refills: 0 | Status: SHIPPED | OUTPATIENT
Start: 2024-12-02

## 2024-12-02 RX ORDER — MULTIVIT-MIN/IRON/FOLIC ACID/K 18-600-40
CAPSULE ORAL
Qty: 200 TABLET | Refills: 0 | Status: SHIPPED | OUTPATIENT
Start: 2024-12-02

## 2024-12-02 NOTE — TELEPHONE ENCOUNTER
Refill Request     CONFIRM preferrred pharmacy with the patient.    If Mail Order Rx - Pend for 90 day refill.      Last Seen: Last Seen Department: 10/2/2024  Last Seen by PCP: 10/2/2024    Last Written: d3 11/26/24, omega 3 11/6/24     If no future appointment scheduled, route STAFF MESSAGE with patient name to the  Pool for scheduling.      Next Appointment:   Future Appointments   Date Time Provider Department Center   12/10/2024  9:00 AM SCHEDULE, [facility] DEP   1/9/2025  9:15 AM Larry Leach MD AND ORTHO MMA   4/8/2025  9:10 AM Bijal Mckinley MD [facility] DEP   6/30/2025  9:00 AM Emily Beatty APRN - CNP CLERM PULM [facility]       Message sent to  to schedule appt with patient?  N/A      Requested Prescriptions     Pending Prescriptions Disp Refills    Omega-3 Fatty Acids (OMEGA-3 FISH OIL) 1000 MG CAPS [Pharmacy Med Name: OMEGA-3 FISH OIL 1000MG CAPSULE] 300 capsule 0     Sig: TAKE SIX (6) CAPSULES TWICE DAILY    D3-1000 25 MCG (1000 UT) TABS tablet [Pharmacy Med Name: D3-1000 1000UNIT TABLET] 200 tablet 0     Sig: TAKE TWO (2) TABLETS BY MOUTH DAILY

## 2024-12-27 RX ORDER — CHLORAL HYDRATE 500 MG
CAPSULE ORAL
Qty: 300 CAPSULE | Refills: 0 | Status: SHIPPED | OUTPATIENT
Start: 2024-12-27

## 2024-12-27 RX ORDER — ENALAPRIL MALEATE 20 MG/1
TABLET ORAL
Qty: 180 TABLET | Refills: 0 | Status: SHIPPED | OUTPATIENT
Start: 2024-12-27

## 2024-12-27 NOTE — TELEPHONE ENCOUNTER
Refill Request     CONFIRM preferrred pharmacy with the patient.    If Mail Order Rx - Pend for 90 day refill.      Last Seen: Last Seen Department: 10/2/2024  Last Seen by PCP: 10/2/2024    Last Written: 9.25.24, 12.2.24    If no future appointment scheduled, route STAFF MESSAGE with patient name to the  Pool for scheduling.      Next Appointment:   Future Appointments   Date Time Provider Department Center   1/16/2025  9:45 AM Larry Leach MD AND ORTHO [facility]   4/8/2025  9:10 AM Bijal Mck[facility]   6/30/2025  9:00 AM Emily Beatty APRN - CNP CLERM PULM [facility]       Message sent to  to schedule appt with patient?  N/A      Requested Prescriptions     Pending Prescriptions Disp Refills    enalapril (VASOTEC) 20 MG tablet [Pharmacy Med Name: ENALAPRIL MALEATE 20MG TABLET] 180 tablet 0     Sig: TAKE ONE (1) TABLET BY MOUTH TWO (2) TIMES DAILY    Omega-3 Fatty Acids (OMEGA-3 FISH OIL) 1000 MG CAPS [Pharmacy Med Name: OMEGA-3 FISH OIL 1000MG CAPSULE] 300 capsule 0     Sig: TAKE SIX (6) CAPSULES TWICE DAILY

## 2025-01-03 ENCOUNTER — APPOINTMENT (OUTPATIENT)
Dept: CT IMAGING | Age: 70
End: 2025-01-03
Payer: MEDICARE

## 2025-01-03 ENCOUNTER — HOSPITAL ENCOUNTER (EMERGENCY)
Age: 70
Discharge: HOME | End: 2025-01-03
Attending: EMERGENCY MEDICINE
Payer: MEDICARE

## 2025-01-03 VITALS
TEMPERATURE: 97.3 F | RESPIRATION RATE: 16 BRPM | DIASTOLIC BLOOD PRESSURE: 89 MMHG | HEART RATE: 59 BPM | SYSTOLIC BLOOD PRESSURE: 156 MMHG | OXYGEN SATURATION: 96 %

## 2025-01-03 DIAGNOSIS — N20.1 URETEROLITHIASIS: Primary | ICD-10-CM

## 2025-01-03 DIAGNOSIS — R10.9 RIGHT FLANK PAIN: ICD-10-CM

## 2025-01-03 LAB
ALBUMIN SERPL-MCNC: 4.4 G/DL (ref 3.4–5)
ALBUMIN/GLOB SERPL: 1.5 {RATIO} (ref 1.1–2.2)
ALP SERPL-CCNC: 91 U/L (ref 40–129)
ALT SERPL-CCNC: 28 U/L (ref 10–40)
ANION GAP SERPL CALCULATED.3IONS-SCNC: 11 MMOL/L (ref 3–16)
AST SERPL-CCNC: 29 U/L (ref 15–37)
BACTERIA URNS QL MICRO: ABNORMAL /HPF
BASOPHILS # BLD: 0 K/UL (ref 0–0.2)
BASOPHILS NFR BLD: 0.7 %
BILIRUB SERPL-MCNC: 0.4 MG/DL (ref 0–1)
BILIRUB UR QL STRIP.AUTO: NEGATIVE
BUN SERPL-MCNC: 19 MG/DL (ref 7–20)
CALCIUM SERPL-MCNC: 9 MG/DL (ref 8.3–10.6)
CHLORIDE SERPL-SCNC: 102 MMOL/L (ref 99–110)
CLARITY UR: ABNORMAL
CO2 SERPL-SCNC: 28 MMOL/L (ref 21–32)
COLOR UR: YELLOW
CREAT SERPL-MCNC: 1.1 MG/DL (ref 0.8–1.3)
DEPRECATED RDW RBC AUTO: 14.4 % (ref 12.4–15.4)
EOSINOPHIL # BLD: 0.2 K/UL (ref 0–0.6)
EOSINOPHIL NFR BLD: 3.1 %
EPI CELLS #/AREA URNS HPF: ABNORMAL /HPF (ref 0–5)
GFR SERPLBLD CREATININE-BSD FMLA CKD-EPI: 72 ML/MIN/{1.73_M2}
GLUCOSE SERPL-MCNC: 109 MG/DL (ref 70–99)
GLUCOSE UR STRIP.AUTO-MCNC: NEGATIVE MG/DL
HCT VFR BLD AUTO: 34 % (ref 40.5–52.5)
HGB BLD-MCNC: 12 G/DL (ref 13.5–17.5)
HGB UR QL STRIP.AUTO: ABNORMAL
KETONES UR STRIP.AUTO-MCNC: NEGATIVE MG/DL
LEUKOCYTE ESTERASE UR QL STRIP.AUTO: NEGATIVE
LYMPHOCYTES # BLD: 1.2 K/UL (ref 1–5.1)
LYMPHOCYTES NFR BLD: 19.3 %
MCH RBC QN AUTO: 33.2 PG (ref 26–34)
MCHC RBC AUTO-ENTMCNC: 35.4 G/DL (ref 31–36)
MCV RBC AUTO: 93.8 FL (ref 80–100)
MONOCYTES # BLD: 0.5 K/UL (ref 0–1.3)
MONOCYTES NFR BLD: 8 %
MUCOUS THREADS #/AREA URNS LPF: ABNORMAL /LPF
NEUTROPHILS # BLD: 4.4 K/UL (ref 1.7–7.7)
NEUTROPHILS NFR BLD: 68.9 %
NITRITE UR QL STRIP.AUTO: NEGATIVE
PH UR STRIP.AUTO: 6 [PH] (ref 5–8)
PLATELET # BLD AUTO: 218 K/UL (ref 135–450)
PMV BLD AUTO: 9.2 FL (ref 5–10.5)
POTASSIUM SERPL-SCNC: 4.4 MMOL/L (ref 3.5–5.1)
PROT SERPL-MCNC: 7.4 G/DL (ref 6.4–8.2)
PROT UR STRIP.AUTO-MCNC: ABNORMAL MG/DL
RBC # BLD AUTO: 3.62 M/UL (ref 4.2–5.9)
RBC #/AREA URNS HPF: >100 /HPF (ref 0–4)
SODIUM SERPL-SCNC: 141 MMOL/L (ref 136–145)
SP GR UR STRIP.AUTO: 1.02 (ref 1–1.03)
UA DIPSTICK W REFLEX MICRO PNL UR: YES
URN SPEC COLLECT METH UR: ABNORMAL
UROBILINOGEN UR STRIP-ACNC: 0.2 E.U./DL
WBC # BLD AUTO: 6.3 K/UL (ref 4–11)
WBC #/AREA URNS HPF: ABNORMAL /HPF (ref 0–5)

## 2025-01-03 PROCEDURE — 85025 COMPLETE CBC W/AUTO DIFF WBC: CPT

## 2025-01-03 PROCEDURE — 6360000002 HC RX W HCPCS: Performed by: EMERGENCY MEDICINE

## 2025-01-03 PROCEDURE — 80053 COMPREHEN METABOLIC PANEL: CPT

## 2025-01-03 PROCEDURE — 96374 THER/PROPH/DIAG INJ IV PUSH: CPT

## 2025-01-03 PROCEDURE — 99284 EMERGENCY DEPT VISIT MOD MDM: CPT

## 2025-01-03 PROCEDURE — 96375 TX/PRO/DX INJ NEW DRUG ADDON: CPT

## 2025-01-03 PROCEDURE — 74176 CT ABD & PELVIS W/O CONTRAST: CPT

## 2025-01-03 PROCEDURE — 81001 URINALYSIS AUTO W/SCOPE: CPT

## 2025-01-03 RX ORDER — ONDANSETRON 2 MG/ML
4 INJECTION INTRAMUSCULAR; INTRAVENOUS
Status: DISCONTINUED | OUTPATIENT
Start: 2025-01-03 | End: 2025-01-03 | Stop reason: HOSPADM

## 2025-01-03 RX ORDER — TAMSULOSIN HYDROCHLORIDE 0.4 MG/1
0.4 CAPSULE ORAL DAILY
Qty: 10 CAPSULE | Refills: 0 | Status: SHIPPED | OUTPATIENT
Start: 2025-01-03 | End: 2025-01-13

## 2025-01-03 RX ORDER — HYDROCODONE BITARTRATE AND ACETAMINOPHEN 5; 325 MG/1; MG/1
1 TABLET ORAL EVERY 6 HOURS PRN
Qty: 12 TABLET | Refills: 0 | Status: SHIPPED | OUTPATIENT
Start: 2025-01-03 | End: 2025-01-06

## 2025-01-03 RX ORDER — KETOROLAC TROMETHAMINE 15 MG/ML
15 INJECTION, SOLUTION INTRAMUSCULAR; INTRAVENOUS ONCE
Status: COMPLETED | OUTPATIENT
Start: 2025-01-03 | End: 2025-01-03

## 2025-01-03 RX ORDER — ONDANSETRON 4 MG/1
4 TABLET, ORALLY DISINTEGRATING ORAL EVERY 8 HOURS PRN
Qty: 20 TABLET | Refills: 0 | Status: SHIPPED | OUTPATIENT
Start: 2025-01-03 | End: 2025-01-10

## 2025-01-03 RX ADMIN — ONDANSETRON 4 MG: 2 INJECTION INTRAMUSCULAR; INTRAVENOUS at 12:27

## 2025-01-03 RX ADMIN — KETOROLAC TROMETHAMINE 15 MG: 15 INJECTION, SOLUTION INTRAMUSCULAR; INTRAVENOUS at 12:29

## 2025-01-03 ASSESSMENT — PAIN DESCRIPTION - LOCATION: LOCATION: FLANK

## 2025-01-03 ASSESSMENT — PAIN SCALES - GENERAL: PAINLEVEL_OUTOF10: 6

## 2025-01-03 ASSESSMENT — PAIN - FUNCTIONAL ASSESSMENT: PAIN_FUNCTIONAL_ASSESSMENT: NONE - DENIES PAIN

## 2025-01-03 ASSESSMENT — PAIN DESCRIPTION - ORIENTATION: ORIENTATION: RIGHT

## 2025-01-03 NOTE — ED PROVIDER NOTES
[facility] ED      CHIEF COMPLAINT  Flank Pain (Right flank pain for a few hours with nausea, history of kidney stones)       HISTORY OF PRESENT ILLNESS  Damian Pandey is a 69 y.o. male  who presents to the ED complaining of ***    No other complaints, modifying factors or associated symptoms.     I have reviewed the following from the nursing documentation.    Past Medical History:   Diagnosis Date    Arthritis     CTS (carpal tunnel syndrome)     Erectile dysfunction     GERD (gastroesophageal reflux disease)     Hyperglycemia     Hyperlipidemia     Hypertension     Hypothyroidism 10/2/2024    Kidney stone 09/2016    Malignant neoplasm of prostate (HCC) 4/19/2021    AJ treated with BiPAP     Osteoarthritis     Trigger thumb     Vitamin D deficiency      Past Surgical History:   Procedure Laterality Date    APPENDECTOMY      BACK SURGERY      CARPAL TUNNEL RELEASE  11/12    right    COLONOSCOPY  08/14/2018    diverticulosis    CYSTOSCOPY Right 09/28/2016     RIGHT URETEROSCOPY , RIGHT STENT PLACEMENT    HEMORRHOID SURGERY      JOINT REPLACEMENT      right shoulder replacement    OTHER SURGICAL HISTORY  10/12/2016    CYSTOSCOPY, DIAGNOSTIC RIGHT URETEROSCOPY, RIGHT RETROGRADE    [facility] COLONOSCOPY FLX DX W/COLLJ SPEC WHEN PFRMD N/A 8/15/2018    COLONOSCOPY performed by Thai Boswell MD at [facility] END[facility]COPY    PROSTATECTOMY      SHOULDER ARTHROPLASTY Left 07/02/13    left total shoulder replacement    TOTAL HIP ARTHROPLASTY Right 1/17/2023    RIGHT TOTAL HIP ARTHROPLASTY MINIMALLY INVASIVE DIRECT ANTERIOR     ALESIA BIOMET performed by Larry Leach MD at [facility] OR     Family History   Problem Relation Age of Onset    Heart Disease Mother     High Blood Pressure Mother     Arthritis Mother     Heart Disease Father     Arthritis Father      Social History     Socioeconomic History    Marital status:      Spouse name: Not on file    Number of children: Not on file    Years of education:  source Oral, resp. rate 16, SpO2 96%.    DISPOSITION  Damian Pandey was discharged to home in stable condition.      Patient was given scripts for the following medications. I counseled patient how to take these medications.   Discharge Medication List as of 1/3/2025  3:13 PM        START taking these medications    Details   ondansetron (ZOFRAN-ODT) 4 MG disintegrating tablet Take 1 tablet by mouth every 8 hours as needed for Nausea or Vomiting May Sub regular tablet (non-ODT) if insurance does not cover ODT., Disp-20 tablet, R-0Normal      tamsulosin (FLOMAX) 0.4 MG capsule Take 1 capsule by mouth daily for 10 doses, Disp-10 capsule, R-0Normal      HYDROcodone-acetaminophen (NORCO) 5-325 MG per tablet Take 1 tablet by mouth every 6 hours as needed for Pain for up to 3 days. Intended supply: 3 days. Take lowest dose possible to manage pain Max Daily Amount: 4 tablets, Disp-12 tablet, R-0Normal             Follow-up with:  Narciso Moe MD  [address] [id number] [phone]      As needed    [facility]  [address] [facility]  3rd Floor  [place] [address]  [phone]  Schedule an appointment as soon as possible for a visit   for further management of your kidney stone    [facility] Emergency Department  [address] [place] [address]  [phone]    If symptoms worsen      DISCLAIMER: This chart was created using Dragon dictation software.  Efforts were made by me to ensure accuracy, however some errors may be present due to limitations of this technology and occasionally words are not transcribed correctly.        Michela Lim MD  01/09/25 09

## 2025-01-09 RX ORDER — SULFAMETHOXAZOLE AND TRIMETHOPRIM 800; 160 MG/1; MG/1
1 TABLET ORAL 2 TIMES DAILY
Qty: 6 TABLET | Refills: 2 | Status: SHIPPED | OUTPATIENT
Start: 2025-01-09 | End: 2025-01-12

## 2025-01-13 DIAGNOSIS — E03.9 ACQUIRED HYPOTHYROIDISM: ICD-10-CM

## 2025-01-13 RX ORDER — LEVOTHYROXINE SODIUM 50 UG/1
50 TABLET ORAL DAILY
Qty: 90 TABLET | Refills: 0 | Status: SHIPPED | OUTPATIENT
Start: 2025-01-13

## 2025-01-13 NOTE — TELEPHONE ENCOUNTER
Refill Request     CONFIRM preferrred pharmacy with the patient.    If Mail Order Rx - Pend for 90 day refill.      Last Seen: Last Seen Department: 10/2/2024  Last Seen by PCP: 10/2/2024    Last Written: 10.7.24    If no future appointment scheduled, route STAFF MESSAGE with patient name to the  Pool for scheduling.      Next Appointment:   Future Appointments   Date Time Provider Department Center   1/16/2025  9:45 AM Larry Leach MD AND ORTHO [facility]   4/8/2025  9:10 AM [facility]   6/30/2025  9:00 AM Emily Beatty APRN - CNP [facility]       Message sent to  to schedule appt with patient?  N/A      Requested Prescriptions     Pending Prescriptions Disp Refills    levothyroxine (SYNTHROID) 50 MCG tablet [Pharmacy Med Name: LEVOTHYROXINE SODIUM 50MCG TABLET] 90 tablet 0     Sig: TAKE ONE (1) TABLET BY MOUTH DAILY

## 2025-01-14 ENCOUNTER — NURSE ONLY (OUTPATIENT)
Dept: FAMILY MEDICINE CLINIC | Age: 70
End: 2025-01-14
Payer: MEDICARE

## 2025-01-14 DIAGNOSIS — E03.9 ACQUIRED HYPOTHYROIDISM: ICD-10-CM

## 2025-01-14 LAB
T4 FREE SERPL-MCNC: 1.5 NG/DL (ref 0.9–1.8)
TSH SERPL DL<=0.005 MIU/L-ACNC: 3.52 UIU/ML (ref 0.27–4.2)

## 2025-01-14 PROCEDURE — 36415 COLL VENOUS BLD VENIPUNCTURE: CPT | Performed by: FAMILY MEDICINE

## 2025-01-16 ENCOUNTER — OFFICE VISIT (OUTPATIENT)
Dept: ORTHOPEDIC SURGERY | Age: 70
End: 2025-01-16

## 2025-01-16 VITALS — HEIGHT: 68 IN | BODY MASS INDEX: 39.86 KG/M2 | WEIGHT: 263 LBS

## 2025-01-16 DIAGNOSIS — Z96.641 STATUS POST RIGHT HIP REPLACEMENT: Primary | ICD-10-CM

## 2025-01-21 RX ORDER — CHLORAL HYDRATE 500 MG
CAPSULE ORAL
Qty: 300 CAPSULE | Refills: 0 | Status: SHIPPED | OUTPATIENT
Start: 2025-01-21

## 2025-02-03 DIAGNOSIS — E78.2 MIXED HYPERLIPIDEMIA: ICD-10-CM

## 2025-02-04 ENCOUNTER — TELEPHONE (OUTPATIENT)
Dept: FAMILY MEDICINE CLINIC | Age: 70
End: 2025-02-04

## 2025-02-04 DIAGNOSIS — L98.9 SKIN LESION: Primary | ICD-10-CM

## 2025-02-04 RX ORDER — ROSUVASTATIN CALCIUM 20 MG/1
20 TABLET, COATED ORAL DAILY
Qty: 90 TABLET | Refills: 0 | Status: SHIPPED | OUTPATIENT
Start: 2025-02-04

## 2025-02-04 NOTE — TELEPHONE ENCOUNTER
Pt was wanting to see if he could get a referral to Dinesh Jasso. And would like for someone to give him a call when it is placed so he can call and get an appointment set up.

## 2025-02-05 NOTE — TELEPHONE ENCOUNTER
Spoke with patient he states he has abnormal skin lesions he would like looked at. I have placed the referral and pt has been notified

## 2025-02-10 RX ORDER — DICLOFENAC SODIUM 75 MG/1
TABLET, DELAYED RELEASE ORAL
Qty: 180 TABLET | Refills: 0 | Status: SHIPPED | OUTPATIENT
Start: 2025-02-10

## 2025-02-10 NOTE — TELEPHONE ENCOUNTER
Refill Request     CONFIRM preferrred pharmacy with the patient.    If Mail Order Rx - Pend for 90 day refill.      Last Seen: Last Seen Department: 10/2/2024  Last Seen by PCP: Visit date not found    Last Written: 8/5/24    If no future appointment scheduled, route STAFF MESSAGE with patient name to the  Pool for scheduling.      Next Appointment:   Future Appointments   Date Time Provider Department Center   4/8/2025  9:30 AM [facility]   6/30/2025  9:00 AM Emily Beatty APRN - CNP CLERM P[facility]       Message sent to  to schedule appt with patient?  N/A      Requested Prescriptions     Pending Prescriptions Disp Refills    diclofenac (VOLTAREN) 75 MG EC tablet 180 tablet 0     Sig: TAKE ONE (1) TABLET BY MOUTH TWO (2) TIMES DAILY

## 2025-02-13 ENCOUNTER — TELEPHONE (OUTPATIENT)
Dept: FAMILY MEDICINE CLINIC | Age: 70
End: 2025-02-13

## 2025-02-13 NOTE — TELEPHONE ENCOUNTER
Called and spoke with pt--informed him that we have been faxing he is going to stop by the office and  the referral and take it to the office of Dr. Jasso

## 2025-02-13 NOTE — TELEPHONE ENCOUNTER
----- Message from Sunilrosangelalawrence PACHECO sent at 2/13/2025  3:37 PM EST -----  Regarding: [facility] Referral Request  [facility] Referral Request    Reason for referral request: Specialty Provider    Specialist/Lab/Test patient is requesting (if known): Dr. Thea Jasso DO    Specialist Phone Number (if applicable): [phone]    Additional Information : Patient wanted to request a referral to the dermatologist , he was calling to request a referral 4th times but when he call to the specialist doctor he specialist did not receive a any referral. Please call the patient.  --------------------------------------------------------------------------------------------------------------------------    Relationship to Patient: Self     Call Back Information: OK to leave message on voicemail  Preferred Call Back Number: Phone [phone]

## 2025-02-17 RX ORDER — CHLORAL HYDRATE 500 MG
CAPSULE ORAL
Qty: 300 CAPSULE | Refills: 0 | OUTPATIENT
Start: 2025-02-17

## 2025-02-17 RX ORDER — CHLORAL HYDRATE 500 MG
CAPSULE ORAL
Qty: 300 CAPSULE | Refills: 0 | Status: SHIPPED | OUTPATIENT
Start: 2025-02-17

## 2025-02-17 NOTE — TELEPHONE ENCOUNTER
Refill Request     CONFIRM preferrred pharmacy with the patient.    If Mail Order Rx - Pend for 90 day refill.      Last Seen: Last Seen Department: 10/2/2024  Last Seen by PCP: Visit date not found    Last Written: 1/21/25    If no future appointment scheduled, route STAFF MESSAGE with patient name to the  Pool for scheduling.      Next Appointment:   Future Appointments   Date Time Provider Department Center   4/8/2025  9:30 AM [facility]   6/30/2025  9:00 AM Emily Beatty APRN - CNP [facility]       Message sent to  to schedule appt with patient?  N/A      Requested Prescriptions     Pending Prescriptions Disp Refills    Omega-3 Fatty Acids (OMEGA-3 FISH OIL) 1000 MG CAPS [Pharmacy Med Name: OMEGA-3 FISH OIL 1000MG CAPSULE] 300 capsule 0     Sig: TAKE SIX (6) CAPSULES TWICE DAILY

## 2025-02-25 DIAGNOSIS — K21.9 GASTROESOPHAGEAL REFLUX DISEASE WITHOUT ESOPHAGITIS: ICD-10-CM

## 2025-02-25 RX ORDER — OMEPRAZOLE 40 MG/1
CAPSULE, DELAYED RELEASE ORAL
Qty: 90 CAPSULE | Refills: 0 | Status: SHIPPED | OUTPATIENT
Start: 2025-02-25

## 2025-02-25 NOTE — TELEPHONE ENCOUNTER
Refill Request     CONFIRM preferrred pharmacy with the patient.    If Mail Order Rx - Pend for 90 day refill.      Last Seen: Last Seen Department: 10/2/2024  Last Seen by PCP: Visit date not found    Last Written: 11/26/24    If no future appointment scheduled, route STAFF MESSAGE with patient name to the  Pool for scheduling.      Next Appointment:   Future Appointments   Date Time Provider Department Center   4/8/2025  9:30 [facility]   6/30/2025  9:00 AM mEily Beatty APRN - CNP CLERM P[facility]       Message sent to  to schedule appt with patient?  N/A      Requested Prescriptions     Pending Prescriptions Disp Refills    omeprazole (PRILOSEC) 40 MG delayed release capsule 90 capsule 0     Sig: TAKE ONE (1) CAPSULE BY MOUTH EVERY MORNING BEFORE BREAKFAST

## 2025-03-21 ENCOUNTER — TELEMEDICINE (OUTPATIENT)
Dept: FAMILY MEDICINE CLINIC | Age: 70
End: 2025-03-21

## 2025-03-21 DIAGNOSIS — Z00.00 MEDICARE ANNUAL WELLNESS VISIT, SUBSEQUENT: Primary | ICD-10-CM

## 2025-03-21 SDOH — ECONOMIC STABILITY: FOOD INSECURITY: WITHIN THE PAST 12 MONTHS, THE FOOD YOU BOUGHT JUST DIDN'T LAST AND YOU DIDN'T HAVE MONEY TO GET MORE.: NEVER TRUE

## 2025-03-21 SDOH — ECONOMIC STABILITY: FOOD INSECURITY: WITHIN THE PAST 12 MONTHS, YOU WORRIED THAT YOUR FOOD WOULD RUN OUT BEFORE YOU GOT MONEY TO BUY MORE.: NEVER TRUE

## 2025-03-21 ASSESSMENT — PATIENT HEALTH QUESTIONNAIRE - PHQ9
SUM OF ALL RESPONSES TO PHQ QUESTIONS 1-9: 0
1. LITTLE INTEREST OR PLEASURE IN DOING THINGS: NOT AT ALL
SUM OF ALL RESPONSES TO PHQ QUESTIONS 1-9: 0
2. FEELING DOWN, DEPRESSED OR HOPELESS: NOT AT ALL

## 2025-03-21 ASSESSMENT — LIFESTYLE VARIABLES
HOW OFTEN DO YOU HAVE A DRINK CONTAINING ALCOHOL: 2-4 TIMES A MONTH
HOW MANY STANDARD DRINKS CONTAINING ALCOHOL DO YOU HAVE ON A TYPICAL DAY: 1 OR 2

## 2025-03-21 NOTE — PATIENT INSTRUCTIONS
Preventive Plan for Damian Pandey - 3/21/2025  Medicare offers a range of preventive health benefits. Some of the tests and screenings are paid in full while other may be subject to a deductible, co-insurance, and/or copay.  Some of these benefits include a comprehensive review of your medical history including lifestyle, illnesses that may run in your family, and various assessments and screenings as appropriate.  After reviewing your medical record and screening and assessments performed today your provider may have ordered immunizations, labs, imaging, and/or referrals for you.  A list of these orders (if applicable) as well as your Preventive Care list are included within your After Visit Summary for your review.

## 2025-03-21 NOTE — PROGRESS NOTES
Medicare Annual Wellness Visit    Damian Pandey is here for Medicare AWV    Assessment & Plan   Medicare annual wellness visit, subsequent     Return in 1 year (on 3/21/2026) for Medicare AWV.     Subjective       Patient's complete Health Risk Assessment and screening values have been reviewed and are found in Flowsheets. The following problems were reviewed today and where indicated follow up appointments were made and/or referrals ordered.    Positive Risk Factor Screenings with Interventions:             General HRA Questions:  Select all that apply: (!) New or Increased Pain, Anger (shoulder pain-gone now)  Interventions - Pain:  See AVS for additional education material  Interventions - Anger:  See AVS for additional education material      Inactivity:  On average, how many days per week do you engage in moderate to strenuous exercise (like a brisk walk)?: 0 days (works on farm) (!) Abnormal  On average, how many minutes do you engage in exercise at this level?: 0 min  Interventions:  See AVS for additional education material     Abnormal BMI (obese):  There is no height or weight on file to calculate BMI. (!) Abnormal  Interventions:  See AVS for additional education material          Vision Screen:  Do you have difficulty driving, watching TV, or doing any of your daily activities because of your eyesight?: No  Have you had an eye exam within the past year?: (!) No (needs appointment)  Interventions:   See AVS for additional education material      Advanced Directives:  Do you have a Living Will?: (!) No (not interested-has paperwork at home)    Intervention:  has NO advanced directive - not interested in additional information         Lung Cancer Screening:                    Objective    Patient-Reported Vitals  No data recorded               Allergies   Allergen Reactions    Suprax [Cefixime] Itching     Swollen hands and itching     Prior to Visit Medications    Medication Sig Taking? Authorizing

## 2025-03-25 RX ORDER — CHLORAL HYDRATE 500 MG
CAPSULE ORAL
Qty: 300 CAPSULE | Refills: 0 | Status: SHIPPED | OUTPATIENT
Start: 2025-03-25

## 2025-04-01 RX ORDER — ENALAPRIL MALEATE 20 MG/1
TABLET ORAL
Qty: 180 TABLET | Refills: 0 | Status: SHIPPED | OUTPATIENT
Start: 2025-04-01

## 2025-04-01 NOTE — TELEPHONE ENCOUNTER
Refill Request     CONFIRM preferrred pharmacy with the patient.    If Mail Order Rx - Pend for 90 day refill.      Last Seen: Last Seen Department: 3/21/2025  Last Seen by PCP: 1/5/2023    Last Written: 12.27.24    If no future appointment scheduled, route STAFF MESSAGE with patient name to the  Pool for scheduling.      Next Appointment:   Future Appointments   Date Time Provider Department Center   4/8/2025  9:30 AM [facility]   6/30/2025  9:00 AM Emily Beatty APRN - CNP CLERM PULM [facility]       Message sent to  to schedule appt with patient?  N/A      Requested Prescriptions     Pending Prescriptions Disp Refills    enalapril (VASOTEC) 20 MG tablet [Pharmacy Med Name: ENALAPRIL MALEATE 20MG TABLET] 180 tablet 0     Sig: TAKE ONE (1) TABLET BY MOUTH TWO (2) TIMES DAILY

## 2025-04-08 ENCOUNTER — OFFICE VISIT (OUTPATIENT)
Dept: FAMILY MEDICINE CLINIC | Age: 70
End: 2025-04-08

## 2025-04-08 VITALS
RESPIRATION RATE: 17 BRPM | WEIGHT: 253 LBS | SYSTOLIC BLOOD PRESSURE: 144 MMHG | DIASTOLIC BLOOD PRESSURE: 73 MMHG | BODY MASS INDEX: 38.48 KG/M2 | HEART RATE: 62 BPM | OXYGEN SATURATION: 95 %

## 2025-04-08 DIAGNOSIS — D64.9 ANEMIA, UNSPECIFIED TYPE: ICD-10-CM

## 2025-04-08 DIAGNOSIS — Z85.46 HISTORY OF PROSTATE CANCER: ICD-10-CM

## 2025-04-08 DIAGNOSIS — E03.9 HYPOTHYROIDISM, UNSPECIFIED TYPE: Primary | ICD-10-CM

## 2025-04-08 DIAGNOSIS — R79.89 INCREASE IN SERUM CREATININE FROM PRIOR MEASUREMENT: ICD-10-CM

## 2025-04-08 DIAGNOSIS — I10 ESSENTIAL HYPERTENSION, BENIGN: ICD-10-CM

## 2025-04-08 DIAGNOSIS — R21 SKIN RASH: ICD-10-CM

## 2025-04-08 PROBLEM — C61 MALIGNANT NEOPLASM OF PROSTATE (HCC): Status: RESOLVED | Noted: 2021-04-19 | Resolved: 2025-04-08

## 2025-04-08 LAB
ANION GAP SERPL CALCULATED.3IONS-SCNC: 10 MMOL/L (ref 3–16)
BASOPHILS # BLD: 0 K/UL (ref 0–0.2)
BASOPHILS NFR BLD: 0.7 %
BUN SERPL-MCNC: 23 MG/DL (ref 7–20)
CALCIUM SERPL-MCNC: 9.9 MG/DL (ref 8.3–10.6)
CHLORIDE SERPL-SCNC: 104 MMOL/L (ref 99–110)
CO2 SERPL-SCNC: 27 MMOL/L (ref 21–32)
CREAT SERPL-MCNC: 1.1 MG/DL (ref 0.8–1.3)
DEPRECATED RDW RBC AUTO: 14.6 % (ref 12.4–15.4)
EOSINOPHIL # BLD: 0.2 K/UL (ref 0–0.6)
EOSINOPHIL NFR BLD: 3.2 %
GFR SERPLBLD CREATININE-BSD FMLA CKD-EPI: 72 ML/MIN/{1.73_M2}
GLUCOSE SERPL-MCNC: 105 MG/DL (ref 70–99)
HCT VFR BLD AUTO: 34.5 % (ref 40.5–52.5)
HGB BLD-MCNC: 11.6 G/DL (ref 13.5–17.5)
LYMPHOCYTES # BLD: 1.3 K/UL (ref 1–5.1)
LYMPHOCYTES NFR BLD: 21.1 %
MCH RBC QN AUTO: 31.2 PG (ref 26–34)
MCHC RBC AUTO-ENTMCNC: 33.6 G/DL (ref 31–36)
MCV RBC AUTO: 92.8 FL (ref 80–100)
MONOCYTES # BLD: 0.4 K/UL (ref 0–1.3)
MONOCYTES NFR BLD: 6.7 %
NEUTROPHILS # BLD: 4.1 K/UL (ref 1.7–7.7)
NEUTROPHILS NFR BLD: 68.3 %
PLATELET # BLD AUTO: 224 K/UL (ref 135–450)
PMV BLD AUTO: 10 FL (ref 5–10.5)
POTASSIUM SERPL-SCNC: 5.4 MMOL/L (ref 3.5–5.1)
RBC # BLD AUTO: 3.72 M/UL (ref 4.2–5.9)
SODIUM SERPL-SCNC: 141 MMOL/L (ref 136–145)
WBC # BLD AUTO: 6 K/UL (ref 4–11)

## 2025-04-08 RX ORDER — HYDROCORTISONE 25 MG/G
CREAM TOPICAL
Qty: 28 G | Refills: 0 | Status: SHIPPED | OUTPATIENT
Start: 2025-04-08

## 2025-04-08 NOTE — PROGRESS NOTES
information related to medication. If the medication that you have been prescribed has the potential to cause sedation, do not drive or operate car, truck, or heavy machinery until you know how the medication will effect you. If you experience any adverse effects from the medication, please call the office or report to the emergency department.    Bijal Mckinley MD  4/8/25

## 2025-04-09 ENCOUNTER — RESULTS FOLLOW-UP (OUTPATIENT)
Dept: FAMILY MEDICINE CLINIC | Age: 70
End: 2025-04-09

## 2025-04-09 DIAGNOSIS — E87.5 HYPERKALEMIA: Primary | ICD-10-CM

## 2025-04-10 ENCOUNTER — CLINICAL SUPPORT (OUTPATIENT)
Dept: FAMILY MEDICINE CLINIC | Age: 70
End: 2025-04-10
Payer: MEDICARE

## 2025-04-10 DIAGNOSIS — E87.5 HYPERKALEMIA: ICD-10-CM

## 2025-04-10 PROCEDURE — 36415 COLL VENOUS BLD VENIPUNCTURE: CPT

## 2025-04-11 ENCOUNTER — RESULTS FOLLOW-UP (OUTPATIENT)
Dept: FAMILY MEDICINE CLINIC | Age: 70
End: 2025-04-11

## 2025-04-11 LAB — POTASSIUM SERPL-SCNC: 4.4 MMOL/L (ref 3.5–5.1)

## 2025-04-22 DIAGNOSIS — E03.9 ACQUIRED HYPOTHYROIDISM: ICD-10-CM

## 2025-04-22 RX ORDER — CHLORAL HYDRATE 500 MG
CAPSULE ORAL
Qty: 300 CAPSULE | Refills: 0 | Status: SHIPPED | OUTPATIENT
Start: 2025-04-22

## 2025-04-22 RX ORDER — LEVOTHYROXINE SODIUM 50 UG/1
50 TABLET ORAL DAILY
Qty: 90 TABLET | Refills: 0 | Status: SHIPPED | OUTPATIENT
Start: 2025-04-22

## 2025-05-03 DIAGNOSIS — E78.2 MIXED HYPERLIPIDEMIA: ICD-10-CM

## 2025-05-05 RX ORDER — ROSUVASTATIN CALCIUM 20 MG/1
20 TABLET, COATED ORAL DAILY
Qty: 90 TABLET | Refills: 5 | Status: SHIPPED | OUTPATIENT
Start: 2025-05-05

## 2025-05-05 NOTE — TELEPHONE ENCOUNTER
Refill Request     CONFIRM preferrred pharmacy with the patient.    If Mail Order Rx - Pend for 90 day refill.      Last Seen: Last Seen Department: 4/10/2025  Last Seen by PCP: 4/8/2025    Last Written: 2/4/25    If no future appointment scheduled, route STAFF MESSAGE with patient name to the  Pool for scheduling.      Next Appointment:   Future Appointments   Date Time Provider Department Center   6/30/2025  9:00 AM [facility]   10/21/2025  8:50 AM [facility]       Message sent to  to schedule appt with patient?  NO      Requested Prescriptions     Pending Prescriptions Disp Refills    rosuvastatin (CRESTOR) 20 MG tablet [Pharmacy Med Name: ROSUVASTATIN CALCIUM 20MG TABLET] 90 tablet 5     Sig: TAKE ONE (1) TABLET BY MOUTH DAILY

## 2025-05-13 RX ORDER — DICLOFENAC SODIUM 75 MG/1
TABLET, DELAYED RELEASE ORAL
Qty: 180 TABLET | Refills: 0 | Status: SHIPPED | OUTPATIENT
Start: 2025-05-13

## 2025-05-13 NOTE — TELEPHONE ENCOUNTER
Refill Request     CONFIRM preferrred pharmacy with the patient.    If Mail Order Rx - Pend for 90 day refill.      Last Seen: Last Seen Department: 4/10/2025  Last Seen by PCP: 4/8/2025    Last Written: 2-    If no future appointment scheduled, route STAFF MESSAGE with patient name to the  Pool for scheduling.      Next Appointment:   Future Appointments   Date Time Provider Department Center   6/30/2025  9:00 AM [facility]   10/21/2025  8:50 AM [facility]       Message sent to  to schedule appt with patient?  N/A      Requested Prescriptions     Pending Prescriptions Disp Refills    diclofenac (VOLTAREN) 75 MG EC tablet [Pharmacy Med Name: DICLOFENAC SODIUM DR 75MG DR TABLET DR] 180 tablet 0     Sig: TAKE ONE (1) TABLET BY MOUTH TWO (2) TIMES DAILY

## 2025-05-16 RX ORDER — CHLORAL HYDRATE 500 MG
CAPSULE ORAL
Qty: 300 CAPSULE | Refills: 0 | Status: SHIPPED | OUTPATIENT
Start: 2025-05-16

## 2025-05-16 NOTE — TELEPHONE ENCOUNTER
Refill Request     CONFIRM preferrred pharmacy with the patient.    If Mail Order Rx - Pend for 90 day refill.      Last Seen: Last Seen Department: 4/10/2025  Last Seen by PCP: 4/8/2025    Last Written: 4/22/25    If no future appointment scheduled, route STAFF MESSAGE with patient name to the  Pool for scheduling.      Next Appointment:   Future Appointments   Date Time Provider Department Center   6/30/2025  9:00 AM [facility]   10/21/2025  8:50 AM [facility]       Message sent to  to schedule appt with patient?  NO      Requested Prescriptions     Pending Prescriptions Disp Refills    Omega-3 Fatty Acids (OMEGA-3 FISH OIL) 1000 MG capsule [Pharmacy Med Name: OMEGA-3 FISH OIL 1000MG CAPSULE] 300 capsule 0     Sig: TAKE SIX (6) CAPSULES TWICE DAILY

## 2025-06-05 DIAGNOSIS — K21.9 GASTROESOPHAGEAL REFLUX DISEASE WITHOUT ESOPHAGITIS: ICD-10-CM

## 2025-06-05 RX ORDER — OMEPRAZOLE 40 MG/1
CAPSULE, DELAYED RELEASE ORAL
Qty: 90 CAPSULE | Refills: 0 | Status: SHIPPED | OUTPATIENT
Start: 2025-06-05

## 2025-06-12 ENCOUNTER — TELEPHONE (OUTPATIENT)
Dept: FAMILY MEDICINE CLINIC | Age: 70
End: 2025-06-12

## 2025-06-12 RX ORDER — CHLORAL HYDRATE 500 MG
CAPSULE ORAL
Qty: 300 CAPSULE | Refills: 0 | OUTPATIENT
Start: 2025-06-12

## 2025-06-12 NOTE — TELEPHONE ENCOUNTER
RN called and spoke to Damian to clarify.    \"Can you please clarify if patient has been taking 6 capsules of fish oil twice a day (12 capsules each day)?  Does he take this for history of high triglycerides?  I see he has been on this dose since 2015 \"    He states he does take 6 capsules twice a day. A total of 12 capsules daily.    He thinks it is to thin his blood.  Its bee so long he doesn't remember why.

## 2025-06-18 RX ORDER — CHLORAL HYDRATE 500 MG
2 CAPSULE ORAL 2 TIMES DAILY
Qty: 300 CAPSULE | Refills: 3 | Status: SHIPPED | OUTPATIENT
Start: 2025-06-18

## 2025-06-18 NOTE — TELEPHONE ENCOUNTER
Refill Request     CONFIRM preferrred pharmacy with the patient.    If Mail Order Rx - Pend for 90 day refill.      Last Seen: Last Seen Department: 4/10/2025  Last Seen by PCP: 4/8/2025    Last Written: 5/16/25    If no future appointment scheduled, route STAFF MESSAGE with patient name to the  Pool for scheduling.      Next Appointment:   Future Appointments   Date Time Provider Department Center   6/30/2025  9:00 AM [facility]   10/21/2025  8:50 AM [facility]       Message sent to  to schedule appt with patient?  N/A      Requested Prescriptions     Pending Prescriptions Disp Refills    Omega-3 Fatty Acids (OMEGA-3 FISH OIL) 1000 MG capsule 300 capsule 0     Sig: Take 6 capsules by mouth 2 times daily

## 2025-06-30 ENCOUNTER — OFFICE VISIT (OUTPATIENT)
Dept: PULMONOLOGY | Age: 70
End: 2025-06-30
Payer: MEDICARE

## 2025-06-30 VITALS
SYSTOLIC BLOOD PRESSURE: 124 MMHG | RESPIRATION RATE: 17 BRPM | OXYGEN SATURATION: 97 % | DIASTOLIC BLOOD PRESSURE: 60 MMHG | HEART RATE: 62 BPM | HEIGHT: 68 IN | BODY MASS INDEX: 38.52 KG/M2 | WEIGHT: 254.2 LBS

## 2025-06-30 DIAGNOSIS — I10 ESSENTIAL HYPERTENSION, BENIGN: ICD-10-CM

## 2025-06-30 DIAGNOSIS — G47.33 SEVERE OBSTRUCTIVE SLEEP APNEA: Primary | ICD-10-CM

## 2025-06-30 DIAGNOSIS — E66.9 OBESITY (BMI 30-39.9): ICD-10-CM

## 2025-06-30 DIAGNOSIS — Z71.89 ENCOUNTER FOR BIPAP USE COUNSELING: ICD-10-CM

## 2025-06-30 DIAGNOSIS — F51.04 PSYCHOPHYSIOLOGICAL INSOMNIA: ICD-10-CM

## 2025-06-30 PROCEDURE — 1159F MED LIST DOCD IN RCRD: CPT | Performed by: NURSE PRACTITIONER

## 2025-06-30 PROCEDURE — 1036F TOBACCO NON-USER: CPT | Performed by: NURSE PRACTITIONER

## 2025-06-30 PROCEDURE — 1123F ACP DISCUSS/DSCN MKR DOCD: CPT | Performed by: NURSE PRACTITIONER

## 2025-06-30 PROCEDURE — G8427 DOCREV CUR MEDS BY ELIG CLIN: HCPCS | Performed by: NURSE PRACTITIONER

## 2025-06-30 PROCEDURE — 3078F DIAST BP <80 MM HG: CPT | Performed by: NURSE PRACTITIONER

## 2025-06-30 PROCEDURE — 3017F COLORECTAL CA SCREEN DOC REV: CPT | Performed by: NURSE PRACTITIONER

## 2025-06-30 PROCEDURE — G2211 COMPLEX E/M VISIT ADD ON: HCPCS | Performed by: NURSE PRACTITIONER

## 2025-06-30 PROCEDURE — G8417 CALC BMI ABV UP PARAM F/U: HCPCS | Performed by: NURSE PRACTITIONER

## 2025-06-30 PROCEDURE — 3074F SYST BP LT 130 MM HG: CPT | Performed by: NURSE PRACTITIONER

## 2025-06-30 PROCEDURE — 99214 OFFICE O/P EST MOD 30 MIN: CPT | Performed by: NURSE PRACTITIONER

## 2025-06-30 PROCEDURE — 1160F RVW MEDS BY RX/DR IN RCRD: CPT | Performed by: NURSE PRACTITIONER

## 2025-06-30 ASSESSMENT — SLEEP AND FATIGUE QUESTIONNAIRES
HOW LIKELY ARE YOU TO NOD OFF OR FALL ASLEEP WHILE SITTING QUIETLY AFTER LUNCH WITHOUT ALCOHOL: SLIGHT CHANCE OF DOZING
HOW LIKELY ARE YOU TO NOD OFF OR FALL ASLEEP IN A CAR, WHILE STOPPED FOR A FEW MINUTES IN TRAFFIC: WOULD NEVER DOZE
HOW LIKELY ARE YOU TO NOD OFF OR FALL ASLEEP WHILE SITTING INACTIVE IN A PUBLIC PLACE: WOULD NEVER DOZE
HOW LIKELY ARE YOU TO NOD OFF OR FALL ASLEEP WHILE WATCHING TV: MODERATE CHANCE OF DOZING
HOW LIKELY ARE YOU TO NOD OFF OR FALL ASLEEP WHILE SITTING AND TALKING TO SOMEONE: WOULD NEVER DOZE
HOW LIKELY ARE YOU TO NOD OFF OR FALL ASLEEP WHEN YOU ARE A PASSENGER IN A CAR FOR AN HOUR WITHOUT A BREAK: SLIGHT CHANCE OF DOZING
ESS TOTAL SCORE: 6
HOW LIKELY ARE YOU TO NOD OFF OR FALL ASLEEP WHILE LYING DOWN TO REST IN THE AFTERNOON WHEN CIRCUMSTANCES PERMIT: SLIGHT CHANCE OF DOZING
HOW LIKELY ARE YOU TO NOD OFF OR FALL ASLEEP WHILE SITTING AND READING: SLIGHT CHANCE OF DOZING

## 2025-06-30 NOTE — PROGRESS NOTES
Patient ID: Damian Pandey is a 69 y.o. male who is being seen today for   Chief Complaint   Patient presents with    Follow-up     One year F/U         HPI:   Damian Pandey is a 69 y.o. male in office for AJ follow up. States he is doing okay with BIPAP.   Patient is using BiPAP 6-7 hrs/night.  States riding in the tractor has caused some aches and pains and that has affected his sleep.  Not using humidifier. No snoring on BIPAP. The pressure is well tolerated. The mask is comfortable- nasal mask. Some mask leak- states does not wash regularly and has not changed in 6+ months. No significant daytime sleepiness. No nodding off when driving. No dry nose or throat. No fatigue. Bedtime is  pm and rise time is 530-6 am. Sleep onset is usually few minutes. Wakes up 1-2 times at night total. 1 nocturia. It takes usually 5-10 minif not achy minutes to fall back a sleep. Rare naps during the day. No headache in am. No weight gain. 0-1 caffienated beverages during the day. No alcohol. ESS is 6        Previous HPI 7/1/24  Damian Pandey is a 69 y.o. male in office for AJ follow up. States it has been a little better.    Patient is using  BiPAP   6-7 hrs/night. Using humidifier. No snoring on BiPAP. The pressure is well tolerated. The mask is comfortable- nasal mask with chin strap. Sometimes mask leak. No significant daytime sleepiness. Denies nodding off when driving. No dry nose or throat. +fatigue. Bedtime is 1030 pm and rise time is 5-6 am. Sleep onset is 5 minutes. Wakes up usually 1 times at night total. 1 nocturia. It takes usually 5 minutes to fall back a sleep. Still does have some itching at times that he is working with PCP, may have pain that can bother him as well.  Occasional naps during the day. No headache in am. No weight gain. 1-2 caffienated beverages during the day.  Occasional alcohol. ESS is 7.      Previous HPI 4/1/24  Damian Pandey is a 69 y.o. male in office for AJ follow up.

## 2025-07-01 RX ORDER — ENALAPRIL MALEATE 20 MG/1
TABLET ORAL
Qty: 180 TABLET | Refills: 0 | Status: SHIPPED | OUTPATIENT
Start: 2025-07-01

## 2025-07-01 RX ORDER — MULTIVIT-MIN/IRON/FOLIC ACID/K 18-600-40
CAPSULE ORAL
Qty: 200 TABLET | Refills: 0 | Status: SHIPPED | OUTPATIENT
Start: 2025-07-01

## 2025-07-01 NOTE — TELEPHONE ENCOUNTER
Refill Request     CONFIRM preferrred pharmacy with the patient.    If Mail Order Rx - Pend for 90 day refill.      Last Seen: Last Seen Department: 4/10/2025  Last Seen by PCP: 4/8/2025    Last Written: 12-2-2024    If no future appointment scheduled, route STAFF MESSAGE with patient name to the  Pool for scheduling.      Next Appointment:   Future Appointments   Date Time Provider Department Center   10/21/2025  8:50 AM [facility]   6/29/2026  9:00 AM Emily Beatty APRN - CNP [facility]       Message sent to  to schedule appt with patient?  N/A      Requested Prescriptions     Pending Prescriptions Disp Refills    D3-1000 25 MCG (1000 UT) TABS tablet [Pharmacy Med Name: D3-1000 1000UNIT TABLET] 200 tablet 0     Sig: TAKE TWO (2) TABLETS BY MOUTH DAILY

## 2025-07-01 NOTE — TELEPHONE ENCOUNTER
Refill Request     CONFIRM preferrred pharmacy with the patient.    If Mail Order Rx - Pend for 90 day refill.      Last Seen: Last Seen Department: 4/10/2025  Last Seen by PCP: 1/5/2023    Last Written: 4-1-2025    If no future appointment scheduled, route STAFF MESSAGE with patient name to the  Pool for scheduling.      Next Appointment:   Future Appointments   Date Time Provider Department Center   10/21/2025  8:50 AM [facility]   6/29/2026  9:00 AM Emily Beatty APRN - CNP [facility]       Message sent to  to schedule appt with patient?  N/A      Requested Prescriptions     Pending Prescriptions Disp Refills    enalapril (VASOTEC) 20 MG tablet [Pharmacy Med Name: ENALAPRIL MALEATE 20MG TABLET] 180 tablet 0     Sig: TAKE ONE (1) TABLET BY MOUTH TWO (2) TIMES DAILY

## 2025-07-03 ENCOUNTER — TELEPHONE (OUTPATIENT)
Dept: TELEMETRY | Age: 70
End: 2025-07-03

## 2025-07-03 DIAGNOSIS — Z12.2 SCREENING FOR LUNG CANCER: ICD-10-CM

## 2025-07-03 DIAGNOSIS — Z72.0 TOBACCO ABUSE: Primary | ICD-10-CM

## 2025-07-03 NOTE — TELEPHONE ENCOUNTER
Pt due for Annual CT Lung Screening, reminder letter mailed, Central Scheduling phone number provided.    CT Lung Screening order has been pended to chart should you choose to sign it.    Janae Coy RN

## 2025-07-28 DIAGNOSIS — E03.9 ACQUIRED HYPOTHYROIDISM: ICD-10-CM

## 2025-07-28 RX ORDER — LEVOTHYROXINE SODIUM 50 UG/1
50 TABLET ORAL DAILY
Qty: 90 TABLET | Refills: 0 | Status: SHIPPED | OUTPATIENT
Start: 2025-07-28

## 2025-07-28 NOTE — TELEPHONE ENCOUNTER
Refill Request     CONFIRM preferrred pharmacy with the patient.    If Mail Order Rx - Pend for 90 day refill.      Last Seen: Last Seen Department: 4/10/2025  Last Seen by PCP: 4/8/2025    Last Written: 4.22.25    If no future appointment scheduled, route STAFF MESSAGE with patient name to the  Pool for scheduling.      Next Appointment:   Future Appointments   Date Time Provider Department Center   10/21/2025  8:50 AM [facility]   6/29/2026  9:00 AM Emily Beatty APRN - CNP [facility]       Message sent to  to schedule appt with patient?  N/A      Requested Prescriptions     Pending Prescriptions Disp Refills    levothyroxine (SYNTHROID) 50 MCG tablet [Pharmacy Med Name: LEVOTHYROXINE SODIUM 50MCG TABLET] 90 tablet 0     Sig: TAKE ONE (1) TABLET BY MOUTH DAILY

## 2025-08-12 RX ORDER — DICLOFENAC SODIUM 75 MG/1
TABLET, DELAYED RELEASE ORAL
Qty: 180 TABLET | Refills: 0 | Status: SHIPPED | OUTPATIENT
Start: 2025-08-12

## (undated) DEVICE — SUTURE VCRL SZ 2-0 L18IN ABSRB UD CT-1 L36MM 1/2 CIR J839D

## (undated) DEVICE — NEEDLE SPNL 20GA L3.5IN YEL HUB S STL REG WALL FIT STYL W/

## (undated) DEVICE — SUTURE PERMAHAND SZ 2-0 L30IN NONABSORBABLE BLK SILK W/O A305H

## (undated) DEVICE — SYSTEM SKIN CLSR 22CM DERMBND PRINEO

## (undated) DEVICE — AIRLIFE™ NASAL OXYGEN CANNULA CURVED, FLARED TIP, WITH 7 FEET (2.1 M) CRUSH RESISTANT TUBING, OVER-THE-EAR STYLE: Brand: AIRLIFE™

## (undated) DEVICE — SYRINGE MED 30ML STD CLR PLAS LUERLOCK TIP N CTRL DISP

## (undated) DEVICE — INTENDED FOR TISSUE SEPARATION, AND OTHER PROCEDURES THAT REQUIRE A SHARP SURGICAL BLADE TO PUNCTURE OR CUT.: Brand: BARD-PARKER ® STAINLESS STEEL BLADES

## (undated) DEVICE — GOWN SIRUS NONREIN XL W/TWL: Brand: MEDLINE INDUSTRIES, INC.

## (undated) DEVICE — SOLUTION,SALINE,IRRGATION,500ML,STRL: Brand: MEDLINE

## (undated) DEVICE — HOOD, PEEL-AWAY: Brand: FLYTE

## (undated) DEVICE — PENCIL SMK EVAC ALL IN 1 DSGN ENH VISIBILITY IMPROVED AIR

## (undated) DEVICE — GLOVE ORTHO 7 1/2   MSG9475

## (undated) DEVICE — HANDPIECE SET WITH HIGH FLOW TIP AND SUCTION TUBE: Brand: INTERPULSE

## (undated) DEVICE — SUTURE ETHBND EXCEL SZ 5 L30IN NONABSORBABLE GRN L40MM V-37 MB66G

## (undated) DEVICE — BIPOLAR SEALER 23-112-1 AQM 6.0: Brand: AQUAMANTYS ®

## (undated) DEVICE — Device

## (undated) DEVICE — DRAPE C ARM W46XL120IN XLN

## (undated) DEVICE — GLOVE SURG SZ 8 L12IN FNGR THK79MIL GRN LTX FREE

## (undated) DEVICE — Z DISCONTINUED USE 2276105 GOWN PROTCT UNIV CHST W28IN L49IN SL 24IN BLU SPUNBOND FLM

## (undated) DEVICE — 3M™ STERI-DRAPE™ INCISE DRAPE 1050 (60CM X 45CM): Brand: STERI-DRAPE™

## (undated) DEVICE — DRAPE SURG UTIL 26X15 IN W/ TAPE N INVASIVE MULT LAYR DISP

## (undated) DEVICE — COVER LT HNDL PLAS RIG 2 PER PK

## (undated) DEVICE — HOOD: Brand: FLYTE

## (undated) DEVICE — SUTURE ETHBND EXCEL SZ 2 L30IN NONABSORBABLE GRN L40MM V-37 MX69G

## (undated) DEVICE — 1010 S-DRAPE TOWEL DRAPE 10/BX: Brand: STERI-DRAPE™

## (undated) DEVICE — SUTURE STRATAFIX SPRL SZ 1 L14IN ABSRB VLT L48CM CTX 1/2 SXPD2B405